# Patient Record
Sex: FEMALE | Race: WHITE | NOT HISPANIC OR LATINO | Employment: OTHER | URBAN - METROPOLITAN AREA
[De-identification: names, ages, dates, MRNs, and addresses within clinical notes are randomized per-mention and may not be internally consistent; named-entity substitution may affect disease eponyms.]

---

## 2018-02-22 DIAGNOSIS — M25.851 FEMOROACETABULAR IMPINGEMENT OF RIGHT HIP: Primary | ICD-10-CM

## 2018-03-22 ENCOUNTER — HOSPITAL ENCOUNTER (OUTPATIENT)
Dept: RADIOLOGY | Facility: HOSPITAL | Age: 63
Discharge: HOME | End: 2018-03-22
Attending: ORTHOPAEDIC SURGERY
Payer: COMMERCIAL

## 2018-03-22 DIAGNOSIS — M25.851 FEMOROACETABULAR IMPINGEMENT OF RIGHT HIP: ICD-10-CM

## 2018-03-22 PROCEDURE — 25000003 PHARM REV CODE 250

## 2018-03-22 PROCEDURE — 20610 DRAIN/INJ JOINT/BURSA W/O US: CPT | Mod: RT,,, | Performed by: RADIOLOGY

## 2018-03-22 PROCEDURE — 25500020 PHARM REV CODE 255

## 2018-03-22 PROCEDURE — 77002 NEEDLE LOCALIZATION BY XRAY: CPT | Mod: 26,,, | Performed by: RADIOLOGY

## 2018-03-22 PROCEDURE — 77002 NEEDLE LOCALIZATION BY XRAY: CPT | Mod: TC

## 2018-03-22 RX ORDER — BUPIVACAINE HYDROCHLORIDE 2.5 MG/ML
10 INJECTION, SOLUTION EPIDURAL; INFILTRATION; INTRACAUDAL ONCE
Status: DISCONTINUED | OUTPATIENT
Start: 2018-03-22 | End: 2018-03-23 | Stop reason: HOSPADM

## 2018-03-22 RX ORDER — LIDOCAINE HYDROCHLORIDE 10 MG/ML
INJECTION, SOLUTION EPIDURAL; INFILTRATION; INTRACAUDAL; PERINEURAL
Status: COMPLETED
Start: 2018-03-22 | End: 2018-03-22

## 2018-03-22 RX ORDER — METHYLPREDNISOLONE ACETATE 80 MG/ML
80 INJECTION, SUSPENSION INTRA-ARTICULAR; INTRALESIONAL; INTRAMUSCULAR; SOFT TISSUE ONCE
Status: DISCONTINUED | OUTPATIENT
Start: 2018-03-22 | End: 2018-03-23 | Stop reason: HOSPADM

## 2018-03-22 RX ORDER — METHYLPREDNISOLONE ACETATE 80 MG/ML
INJECTION, SUSPENSION INTRA-ARTICULAR; INTRALESIONAL; INTRAMUSCULAR; SOFT TISSUE
Status: DISPENSED
Start: 2018-03-22 | End: 2018-03-22

## 2018-03-22 RX ORDER — BUPIVACAINE HYDROCHLORIDE 2.5 MG/ML
INJECTION, SOLUTION EPIDURAL; INFILTRATION; INTRACAUDAL
Status: DISPENSED
Start: 2018-03-22 | End: 2018-03-22

## 2018-03-22 RX ADMIN — LIDOCAINE HYDROCHLORIDE: 10 INJECTION, SOLUTION EPIDURAL; INFILTRATION; INTRACAUDAL; PERINEURAL at 10:03

## 2018-03-22 RX ADMIN — IOHEXOL 10 ML: 350 INJECTION, SOLUTION INTRAVENOUS at 10:03

## 2018-05-03 ENCOUNTER — OFFICE VISIT (OUTPATIENT)
Dept: ORTHOPEDICS | Facility: CLINIC | Age: 63
End: 2018-05-03

## 2018-05-03 VITALS
WEIGHT: 198 LBS | SYSTOLIC BLOOD PRESSURE: 130 MMHG | HEART RATE: 80 BPM | DIASTOLIC BLOOD PRESSURE: 90 MMHG | BODY MASS INDEX: 31.82 KG/M2 | HEIGHT: 66 IN

## 2018-05-03 DIAGNOSIS — M25.551 RIGHT HIP PAIN: Primary | ICD-10-CM

## 2018-05-03 DIAGNOSIS — M25.512 CHRONIC LEFT SHOULDER PAIN: ICD-10-CM

## 2018-05-03 DIAGNOSIS — G89.29 CHRONIC LEFT SHOULDER PAIN: ICD-10-CM

## 2018-05-03 DIAGNOSIS — M16.11 PRIMARY OSTEOARTHRITIS OF RIGHT HIP: ICD-10-CM

## 2018-05-03 DIAGNOSIS — M75.42 SUBACROMIAL IMPINGEMENT OF LEFT SHOULDER: ICD-10-CM

## 2018-05-03 DIAGNOSIS — S42.292D FRACTURE, HUMERUS, HEAD, LEFT, WITH ROUTINE HEALING, SUBSEQUENT ENCOUNTER: ICD-10-CM

## 2018-05-03 PROCEDURE — 99213 OFFICE O/P EST LOW 20 MIN: CPT | Mod: 25,,, | Performed by: ORTHOPAEDIC SURGERY

## 2018-05-03 PROCEDURE — 73030 X-RAY EXAM OF SHOULDER: CPT | Mod: LT,,, | Performed by: ORTHOPAEDIC SURGERY

## 2018-05-03 PROCEDURE — 20610 DRAIN/INJ JOINT/BURSA W/O US: CPT | Mod: LT,,, | Performed by: ORTHOPAEDIC SURGERY

## 2018-05-03 PROCEDURE — 73502 X-RAY EXAM HIP UNI 2-3 VIEWS: CPT | Mod: RT,,, | Performed by: ORTHOPAEDIC SURGERY

## 2018-05-03 RX ORDER — METHYLPREDNISOLONE ACETATE 40 MG/ML
40 INJECTION, SUSPENSION INTRA-ARTICULAR; INTRALESIONAL; INTRAMUSCULAR; SOFT TISSUE
Status: DISCONTINUED | OUTPATIENT
Start: 2018-05-03 | End: 2018-05-03 | Stop reason: HOSPADM

## 2018-05-03 RX ORDER — TOPIRAMATE 50 MG/1
TABLET, FILM COATED ORAL
COMMUNITY
End: 2018-06-11

## 2018-05-03 RX ORDER — TRAMADOL HYDROCHLORIDE 50 MG/1
TABLET ORAL
COMMUNITY
End: 2018-05-17

## 2018-05-03 RX ORDER — ROPINIROLE 1 MG/1
TABLET, FILM COATED ORAL
COMMUNITY
End: 2023-02-03 | Stop reason: SDUPTHER

## 2018-05-03 RX ORDER — FERROUS SULFATE 325(65) MG
TABLET ORAL
Status: ON HOLD | COMMUNITY
End: 2018-06-26 | Stop reason: HOSPADM

## 2018-05-03 RX ORDER — METHYLPREDNISOLONE ACETATE 80 MG/ML
1 INJECTION, SUSPENSION INTRA-ARTICULAR; INTRALESIONAL; INTRAMUSCULAR; SOFT TISSUE
COMMUNITY
End: 2018-05-17 | Stop reason: CLARIF

## 2018-05-03 RX ORDER — CLORAZEPATE DIPOTASSIUM 7.5 MG/1
TABLET ORAL
COMMUNITY
End: 2020-03-02

## 2018-05-03 RX ORDER — GABAPENTIN 300 MG/1
CAPSULE ORAL
COMMUNITY
End: 2018-05-17 | Stop reason: ALTCHOICE

## 2018-05-03 RX ORDER — DULOXETIN HYDROCHLORIDE 60 MG/1
CAPSULE, DELAYED RELEASE ORAL
COMMUNITY
End: 2018-05-17 | Stop reason: ALTCHOICE

## 2018-05-03 RX ORDER — PREGABALIN 50 MG/1
CAPSULE ORAL
COMMUNITY
End: 2018-05-17 | Stop reason: ALTCHOICE

## 2018-05-03 RX ORDER — CARISOPRODOL 350 MG/1
TABLET ORAL
COMMUNITY
End: 2018-05-17

## 2018-05-03 RX ORDER — SIMVASTATIN 20 MG/1
TABLET, FILM COATED ORAL
COMMUNITY
End: 2020-03-02

## 2018-05-03 RX ORDER — DEXLANSOPRAZOLE 60 MG/1
CAPSULE, DELAYED RELEASE ORAL
COMMUNITY
End: 2018-05-17 | Stop reason: ALTCHOICE

## 2018-05-03 RX ADMIN — METHYLPREDNISOLONE ACETATE 40 MG: 40 INJECTION, SUSPENSION INTRA-ARTICULAR; INTRALESIONAL; INTRAMUSCULAR; SOFT TISSUE at 03:05

## 2018-05-03 NOTE — PROCEDURES
Large Joint Aspiration/Injection: L subacromial bursa  Date/Time: 5/3/2018 3:05 PM  Performed by: Prosper Hodge MD  Authorized by: Prosper Hodge MD     Consent Done?:  Yes (Verbal)  Indications:  Pain  Procedure site marked: Yes    Timeout: Prior to procedure the correct patient, procedure, and site was verified      Location:  Shoulder  Site:  L subacromial bursa  Prep: Patient was prepped and draped in usual sterile fashion    Ultrasonic Guidance for needle placement: No  Needle size:  25 G  Medications:  40 mg methylPREDNISolone acetate 40 mg/mL; 40 mg methylPREDNISolone acetate 40 mg/mL  Aspirate:  Clear  Patient tolerance:  Patient tolerated the procedure well with no immediate complications    Right shoulder injected

## 2018-05-03 NOTE — PROGRESS NOTES
[facility]    Subjective:     Chief Complaint:   Chief Complaint   Patient presents with    Right Hip - Pain     Right hip injection worked for about 10 days and now right hip is hurting again.    Left Shoulder - Pain     She does have left shoulder pain. She brought disk of X rays done at [facility]       Past Medical History:   Diagnosis Date    Anxiety     Depression     Hypertension        Past Surgical History:   Procedure Laterality Date    HYSTERECTOMY      implanted spinal cord stimulator      TONSILLECTOMY         Current Outpatient Prescriptions   Medication Sig    clorazepate (TRANXENE) 7.5 MG Tab clorazepate dipotassium 7.5 mg tablet   Take 1 tablet 3 times a day by oral route as needed.    dexlansoprazole (DEXILANT) 60 mg capsule Dexilant 60 mg capsule, delayed release   Take 1 capsule every day by oral route for 8 days.    DULoxetine (CYMBALTA) 60 MG capsule Cymbalta 60 mg capsule,delayed release   Take 1 capsule twice a day by oral route for 30 days.    ferrous sulfate 325 mg (65 mg iron) Tab tablet ferrous sulfate 325 mg (65 mg iron) tablet   Take 1 tablet every day by oral route.    ranitidine (ZANTAC) 300 MG capsule Take 300 mg by mouth every evening.    rOPINIRole (REQUIP) 1 MG tablet ropinirole 1 mg tablet   Take 1 tablet every day by oral route at bedtime.    simvastatin (ZOCOR) 20 MG tablet simvastatin 20 mg tablet   Take 1 tablet every day by oral route in the morning.    topiramate (TOPAMAX) 50 MG tablet topiramate 50 mg tablet   Take 1 tablet twice a day by oral route for 30 days.    traMADol (ULTRAM) 50 mg tablet tramadol 50 mg tablet   Take 1  to 2 tablets every 6hrs prn    carisoprodol (SOMA) 350 MG tablet Soma 350 mg tablet   Take 1 tablet as needed by oral route at bedtime for 30 days.    gabapentin (NEURONTIN) 300 MG capsule gabapentin 300 mg capsule   Take 3 capsules 3 times a day by oral route for 30 days.    methylPREDNISolone acetate (DEPO-MEDROL) 80 mg/mL  injection 1 mL.    pregabalin (LYRICA) 50 MG capsule Lyrica 50 mg capsule   Take 1 capsule every day by oral route as directed.     No current facility-administered medications for this visit.        Review of patient's allergies indicates:   Allergen Reactions    Erythromycin Itching    Morphine Swelling    Tramadol Itching    Meperidine Palpitations       History reviewed. No pertinent family history.    Social History     Social History    Marital status:      Spouse name: N/A    Number of children: N/A    Years of education: N/A     Occupational History    Not on file.     Social History Main Topics    Smoking status: Former Smoker    Smokeless tobacco: Not on file    Alcohol use No    Drug use: Unknown    Sexual activity: Not on file     Other Topics Concern    Not on file     Social History Narrative    No narrative on file       History of present illness: Patient comes in today for her left shoulder and right hip. When I saw her last time she had injured her left shoulder but she was being cared for by another orthopedist. She now wants my opinion on her left shoulder. In terms of her right hip her pain is become so severe that she really wants to discuss a hip replacement.      Review of Systems:    Constitution: Negative for chills, fever, and sweats.  Negative for unexplained weight loss.    HENT:  Negative for headaches and blurry vision.    Cardiovascular:Negative for chest pain or irregular heart beat. Negative for hypertension.    Respiratory:  Negative for cough and shortness of breath.    Gastrointestinal: Negative for abdominal pain, heartburn, melena, nausea, and vomitting.    Genitourinary:  Negative bladder incontinence and dysuria.    Musculoskeletal:  See HPI for details.     Neurological: Negative for numbness.    Psychiatric/Behavioral: Negative for depression.  The patient is not nervous/anxious.      Endocrine: Negative for polyuria    Hematologic/Lymphatic: Negative  for bleeding problem.  Does not bruise/bleed easily.    Skin: Negative for poor would healing and rash    Objective:      Physical Examination:    Vital Signs:    Vitals:    05/03/18 1350   BP: (!) 130/90   Pulse: 80       Body mass index is 31.96 kg/m².    This a well-developed, well nourished patient in no acute distress.  They are alert and oriented and cooperative to examination.        Patient has severe discomfort with internal/external rotation of the right hip. She has severe pain with forward flexion of the right hip.    Patient can raise her right shoulder to flexion of 70°. External rotation is 30°. Internal rotation is 20°.  Pertinent New Results:    XRAY Report / Interpretation:   AP and lateral of the left shoulder demonstrates a proximal humerus fracture which has healed with malunion. She has elevation of the tuberosity fragments.    AP and lateral of the right hip demonstrates severe osteoarthritis of the right hip joint.    Assessment/Plan:      Right hip osteoarthritis. She will ultimately need a hip replacement. However because of the severe shoulder pain she's having with the right proximal humerus fracture I've ordered a CT scan to better understand the fracture. She is unable to have an MRI of the shoulder. We will see her back with that CT scan. She may ultimately require a subacromial decompression on the humeral side or a reverse total shoulder. Her left shoulder is injected today      This note was created using Dragon voice recognition software that occasionally misinterpreted phrases or words.

## 2018-05-07 ENCOUNTER — TELEPHONE (OUTPATIENT)
Dept: ORTHOPEDICS | Facility: CLINIC | Age: 63
End: 2018-05-07

## 2018-05-07 NOTE — TELEPHONE ENCOUNTER
----- Message from Christy Jennings sent at 5/7/2018 12:06 PM CDT -----  Contact: Patient  Wants to schedule right hip replacement.  Please call [phone]

## 2018-05-17 ENCOUNTER — OFFICE VISIT (OUTPATIENT)
Dept: ORTHOPEDICS | Facility: CLINIC | Age: 63
End: 2018-05-17

## 2018-05-17 VITALS
BODY MASS INDEX: 30.7 KG/M2 | HEIGHT: 66 IN | DIASTOLIC BLOOD PRESSURE: 80 MMHG | WEIGHT: 191 LBS | SYSTOLIC BLOOD PRESSURE: 118 MMHG

## 2018-05-17 DIAGNOSIS — S42.292D FRACTURE, HUMERUS, HEAD, LEFT, WITH ROUTINE HEALING, SUBSEQUENT ENCOUNTER: ICD-10-CM

## 2018-05-17 DIAGNOSIS — M16.11 PRIMARY OSTEOARTHRITIS OF RIGHT HIP: Primary | ICD-10-CM

## 2018-05-17 PROCEDURE — 99214 OFFICE O/P EST MOD 30 MIN: CPT | Mod: ,,, | Performed by: ORTHOPAEDIC SURGERY

## 2018-05-17 NOTE — PROGRESS NOTES
[facility]    Subjective:     Chief Complaint:   Chief Complaint   Patient presents with    Left Shoulder - Pain     Left shoulder CT results       Past Medical History:   Diagnosis Date    Anxiety     Depression     Hypertension        Past Surgical History:   Procedure Laterality Date    HYSTERECTOMY      implanted spinal cord stimulator      TONSILLECTOMY         Current Outpatient Prescriptions   Medication Sig    clorazepate (TRANXENE) 7.5 MG Tab clorazepate dipotassium 7.5 mg tablet   Take 1 tablet 3 times a day by oral route as needed.    dexlansoprazole (DEXILANT) 60 mg capsule Dexilant 60 mg capsule, delayed release   Take 1 capsule every day by oral route for 8 days.    DULoxetine (CYMBALTA) 60 MG capsule Cymbalta 60 mg capsule,delayed release   Take 1 capsule twice a day by oral route for 30 days.    ferrous sulfate 325 mg (65 mg iron) Tab tablet ferrous sulfate 325 mg (65 mg iron) tablet   Take 1 tablet every day by oral route.    gabapentin (NEURONTIN) 300 MG capsule gabapentin 300 mg capsule   Take 3 capsules 3 times a day by oral route for 30 days.    methylPREDNISolone acetate (DEPO-MEDROL) 80 mg/mL injection 1 mL.    pregabalin (LYRICA) 50 MG capsule Lyrica 50 mg capsule   Take 1 capsule every day by oral route as directed.    ranitidine (ZANTAC) 300 MG capsule Take 300 mg by mouth every evening.    rOPINIRole (REQUIP) 1 MG tablet ropinirole 1 mg tablet   Take 1 tablet every day by oral route at bedtime.    simvastatin (ZOCOR) 20 MG tablet simvastatin 20 mg tablet   Take 1 tablet every day by oral route in the morning.    topiramate (TOPAMAX) 50 MG tablet topiramate 50 mg tablet   Take 1 tablet twice a day by oral route for 30 days.     No current facility-administered medications for this visit.        Review of patient's allergies indicates:   Allergen Reactions    Erythromycin Itching    Morphine Swelling    Tramadol Itching    Meperidine Palpitations        History reviewed. No pertinent family history.    Social History     Social History    Marital status:      Spouse name: N/A    Number of children: N/A    Years of education: N/A     Occupational History    Not on file.     Social History Main Topics    Smoking status: Former Smoker    Smokeless tobacco: Not on file    Alcohol use No    Drug use: Unknown    Sexual activity: Not on file     Other Topics Concern    Not on file     Social History Narrative    No narrative on file       History of present illness: Patient comes in today for her left shoulder and her right hip. The injection with the left shoulder helps. She's really not having nearly as much discomfort as she is in her right hip. She wants to have her right hip fix.      Review of Systems:    Constitution: Negative for chills, fever, and sweats.  Negative for unexplained weight loss.    HENT:  Negative for headaches and blurry vision.    Cardiovascular:Negative for chest pain or irregular heart beat. Negative for hypertension.    Respiratory:  Negative for cough and shortness of breath.    Gastrointestinal: Negative for abdominal pain, heartburn, melena, nausea, and vomitting.    Genitourinary:  Negative bladder incontinence and dysuria.    Musculoskeletal:  See HPI for details.     Neurological: Negative for numbness.    Psychiatric/Behavioral: Negative for depression.  The patient is not nervous/anxious.      Endocrine: Negative for polyuria    Hematologic/Lymphatic: Negative for bleeding problem.  Does not bruise/bleed easily.    Skin: Negative for poor would healing and rash    Objective:      Physical Examination:    Vital Signs:    Vitals:    05/17/18 1320   BP: 118/80       Body mass index is 30.83 kg/m².    This a well-developed, well nourished patient in no acute distress.  They are alert and oriented and cooperative to examination.        Patient has flexion of the right hip to 90°. She has limited internal and external  rotation. She has severe pain with internal and external rotation. She has a 10° flexion contracture. Full range of motion of the right hip. She can get her left hand up overhead. She has abduction to 80° flexion 220°.  Pertinent New Results:  CT scan of the left shoulder is reviewed. It demonstrates an elevated tuberosity fragments. It is unionized partially.  XRAY Report / Interpretation:   No new XRAYS Today.    Assessment/Plan:      Severe osteoarthritis of the right hip. I've offered her a right total hip arthroplasty. In terms of the left shoulder she is an attempt to simply live with this for now. She'll get worse in the future she may need a reverse total shoulder      This note was created using Dragon voice recognition software that occasionally misinterpreted phrases or words.

## 2018-06-04 DIAGNOSIS — M16.11 PRIMARY OSTEOARTHRITIS OF RIGHT HIP: Primary | ICD-10-CM

## 2018-06-04 DIAGNOSIS — Z01.818 PRE-OP EVALUATION: ICD-10-CM

## 2018-06-11 ENCOUNTER — HOSPITAL ENCOUNTER (OUTPATIENT)
Dept: PREADMISSION TESTING | Facility: HOSPITAL | Age: 63
Discharge: HOME | End: 2018-06-11
Attending: ORTHOPAEDIC SURGERY
Payer: COMMERCIAL

## 2018-06-11 ENCOUNTER — HOSPITAL ENCOUNTER (OUTPATIENT)
Dept: RADIOLOGY | Facility: HOSPITAL | Age: 63
Discharge: HOME | End: 2018-06-11
Attending: ORTHOPAEDIC SURGERY
Payer: COMMERCIAL

## 2018-06-11 VITALS — WEIGHT: 198 LBS | BODY MASS INDEX: 31.82 KG/M2 | HEIGHT: 66 IN

## 2018-06-11 DIAGNOSIS — M16.11 PRIMARY OSTEOARTHRITIS OF RIGHT HIP: ICD-10-CM

## 2018-06-11 DIAGNOSIS — Z01.818 PRE-OP EVALUATION: ICD-10-CM

## 2018-06-11 LAB
BILIRUB UR QL STRIP: NEGATIVE
CLARITY UR: CLEAR
COLOR UR: YELLOW
GLUCOSE UR QL STRIP: NEGATIVE
HGB UR QL STRIP: NEGATIVE
KETONES UR QL STRIP: NEGATIVE
LEUKOCYTE ESTERASE UR QL STRIP: NEGATIVE
NITRITE UR QL STRIP: NEGATIVE
PH UR STRIP: 6 [PH] (ref 5–8)
PROT UR QL STRIP: NEGATIVE
SP GR UR STRIP: 1.01 (ref 1–1.03)
URN SPEC COLLECT METH UR: NORMAL
UROBILINOGEN UR STRIP-ACNC: NEGATIVE EU/DL

## 2018-06-11 PROCEDURE — 99900103 DSU ONLY-NO CHARGE-INITIAL HR (STAT)

## 2018-06-11 PROCEDURE — 81003 URINALYSIS AUTO W/O SCOPE: CPT

## 2018-06-11 PROCEDURE — 99900104 DSU ONLY-NO CHARGE-EA ADD'L HR (STAT)

## 2018-06-11 PROCEDURE — 93005 ELECTROCARDIOGRAM TRACING: CPT

## 2018-06-11 PROCEDURE — 71046 X-RAY EXAM CHEST 2 VIEWS: CPT | Mod: 26,,, | Performed by: RADIOLOGY

## 2018-06-11 PROCEDURE — 71046 X-RAY EXAM CHEST 2 VIEWS: CPT | Mod: TC,FY

## 2018-06-11 PROCEDURE — 93010 ELECTROCARDIOGRAM REPORT: CPT | Mod: ,,, | Performed by: INTERNAL MEDICINE

## 2018-06-11 RX ORDER — OMEPRAZOLE 10 MG/1
10 CAPSULE, DELAYED RELEASE ORAL DAILY
COMMUNITY
End: 2021-05-25

## 2018-06-11 NOTE — PRE ADMISSION SCREENING
Patient Name: Gloria Manuel  YOB: 1955   MRN: [id number]     [facility]   Basic Mobility Inpatient Short Form 6 Clicks         How much difficulty does the patient currently have  Unable  A Lot  A Little  None      1. Turning over in bed (including adjusting bedclothes, sheets and blankets)?     1 []    2 [x]    3 []    4 []        2. Sitting down on and standing up from a chair with arms (e.g., wheelchair, bedside commode, etc.)     1 []  2 []  3 []     4 [x]      3. Moving from lying on back to sitting on the side of the bed?     1 []  2 [x]  3 []    4 []    How much help from another person does the patient currently need  Total  A Lot  A Little  None      4. Moving to and from a bed to a chair (including a wheelchair)?    1 []  2 []  3 []    4 [x]      5. Need to walk in hospital room?    1 []  2 []  3 []    4 [x]      6. Climbing 3-5 steps with a railing?    1 []  2 []  3 []    4 [x]       Raw Score:     20             CMS 0-100% Score:   35.83         %   Standardized Score:    47.67           CMS Modifier:      CJ                                    [facility]   Basic Mobility Inpatient Short Form 6 Clicks Score Conversion Table*         *Use this form to convert -PAC Basic Mobility Inpatient Raw Scores.   -[facility] Inpatient Basic Mobility Short Form Scoring Example   1. Add the number values associated with the response to each item. For example, items totals yield a Raw Score of 21.   2. Match the raw score to the t-Scale scores (t-Scale score = 50.25, SE = 4.69).   3. Find the associated CMS % (CMS % = 28.97%).   4. Locate the correct CMS Functional Modifier Code, or G Code (G code = CJ)     NOTE: Each -PAC Short Form has a separate conversion table. Make sure that you use the correct conversion table.       Instruction Manual - page 45 contains conversion table

## 2018-06-11 NOTE — PRE-PROCEDURE INSTRUCTIONS
CATH U/A obtained using sterile technique and 15 F cath. Tolerated procedure well. Clear yellow urine to lab.

## 2018-06-11 NOTE — PRE ADMISSION SCREENING
JOINT CAMP ASSESSMENT    Name Gloria Manuel   MRN [id number]    Age/Sex 62 y.o. female    Surgeon Dr. Prosper Hodge   Joint Camp Date 6/12/2018   Surgery Date 6/25/2018   Procedure Right Hip Arthroplasty   Insurance Payor: [employer] / Plan: [employer] / Product Type: Commercial /    Care Team Patient Care Team:  Mario Alberto Byrne MD as PCP - General (Family Medicine)  Prosper Hodge MD as Consulting Physician (Orthopedic Surgery)    Pharmacy   [id number] - [place], [place] - [address]  [address]  [place] [address]  Phone: [phone] Fax: [phone]     AM-PAC Score   20   Risk Assessment Score 6     Past Medical History:   Diagnosis Date    Anxiety     Arthritis     Back pain     Depression     MVP (mitral valve prolapse)     RLS (restless legs syndrome)     Wears dentures     UPPER AND LOWER BRIDGE    Wears glasses        Past Surgical History:   Procedure Laterality Date    HYSTERECTOMY      implanted spinal cord stimulator      NONFUNCTIONING X 15 YEARS    TONSILLECTOMY           Home Enviroment     Living Arrangement: Lives with spouse  Home Environment: 1-story house/ trailer, number of outside stairs: 1, walk-in shower  Home Safety Concerns: unremarkable    DISCHARGE CAREGIVER/SUPPORT SYSTEM     Identified post-op caregiver: Patient has spouse / significant other.  Patient's caregiver(s) will be able to provide physical assistance. Patient will have someone to assist overnight.      Caregiver present at pre-op interview:  No      PRE-OPERATIVE FUNCTIONAL STATUS     Employment: Employed full time    Pre-op Functional Status: Patient is independent with mobility/ambulation, transfers, ADL's, IADL's.    Use of assistive device for ambulation: none  ADL: self care  ADL Limitations: difficulty with walking  Medical Restrictions: Decreased range of motions in extremities    POTENTIAL BARRIERS TO DISCHARGE/POTENTIAL POST-OP COMPLICATIONS      Patient with Hx of restless leg syndrome, mitral valve prolapse, back pain and anxiety.      DISCHARGE PLAN     Expected LOS of 2 days or less for joint replacement discussed with patient. We also discussed a discharge path of HH for approximately two weeks with a transition to outpatient PT on the third week given no post-op complications.      Patient in agreement with discharge plan: Yes    Discharge to: Discharge home with home brigida (PT/OT) x2 weeks with transition to outpatient PT     HH: [facility]      OP PT: [facility]     Home DME: none     Needed DME at D/C: rolling walker, bedside commode and assistive device kit     Rx: Per Dr. Hodge at discharge.     Meds to Beds: N/A     Patient expected to discharge on Aspirin 325mg by mouth twice daily for DVT prophylaxis.

## 2018-06-12 NOTE — PRE ADMISSION SCREENING
"               [facility] Risk Assessment Scale    Patient Name: Gloria Manuel  YOB: 1955  MRN: [id number]            RIsk Factor Measure Recommendation Patient Data Scale/Score   BMI >40 Reconsider surgery, weight loss   Estimated body mass index is 31.96 kg/m² as calculated from the following:    Height as of this encounter: 5' 6" (1.676 m).    Weight as of this encounter: 89.8 kg (198 lb).   [] 0 = 1 - 24.9  [] 1 = 25-29.9  [x] 2 = 30-34.9  [] 3 = 35-39.9  [] 4 = 40-44.9  [] 5 = 45-99.9   Hemoglobin AIC (if applicable) >9 Delay surgery until DM under control  Refer for:  · Nutrition Therapy  · Exercise   · Medication    No results found for: LABA1C, HGBA1C    Lab Results   Component Value Date     06/11/2018      [] 0 = 4.0-5.6  [] 1 = 5.7-6.4  [] 2 = 6.5-6.9  [] 3 = 7.0-7.9  [] 4 = 8.0-8.9  [] 5 = 9.0-12   Hemoglobin (Anemia) <9 Delay surgery   Correct anemia Lab Results   Component Value Date    HGB 13.6 06/11/2018    [] 20 - <9.0                    Albumin <3 Delay surgery &Workup Lab Results   Component Value Date    ALBUMIN 3.7 06/11/2018    [] 20 - <3.0   Smoking Cessation >4 Weeks Delay Surgery  Refer to OP Cessation Class    Former Smoker [] 20 - current smoker                                _____ PPD                    Hx of MI, PE, Arrhythmia, CVA, DVT <30 Days Delay Surgery    N/A [] 20      Infection Variable Delay surgery and re-evaluate   N/A [] 20 - recent/current infection     Depression (PHQ) >10 out of 27 Delay Surgery and re-evaluate  Medication  Counseling              [x] 0     []1     []2     []3      []4      [] 5                    (1-4)      (5-9)  (10-14)  (15-19)   (20-27)     Memory Impairment & Memory loss (Mini-Cog Screening Tool) Advanced dementia and/or Parkinson's Reconsider surgery     [x] 0     []1     []2     []3     []4     [] 5     Physical Conditioning (Modified AM-PAC Per Physical Therapy at [facility]) Unable to ambulate on day of surgery Delay surgery " and re-evaluate  Pre-Rehabilitation   (PT evaluation)       []  0   [x]4       []8     []12        []16     []20       (<20%)   (<40%)   (<60%)   (<80% )    (>80%)     Home Environment/Caregiver support  (Per /Navigator Interview)    Availability of basic services and/or approprate assistance during post-operative period Delay surgery and re-evaluate  Safe home environment  Health   1 week post-surgery  Transportation  availability  Ability to obtain DME/Medications post-op    [x] 0     []1     []2     []3     []4     [] 5  [x] 0     []1     []2     []3     []4     [] 5  [x] 0     []1     []2     []3     []4     [] 5  [x] 0     []1     []2     []3     []4     [] 5         MD Contact: Dr. Hodge Comments:  Total Score:  6

## 2018-06-22 ENCOUNTER — ANESTHESIA EVENT (OUTPATIENT)
Dept: SURGERY | Facility: HOSPITAL | Age: 63
DRG: 470 | End: 2018-06-22
Payer: COMMERCIAL

## 2018-06-25 ENCOUNTER — HOSPITAL ENCOUNTER (INPATIENT)
Facility: HOSPITAL | Age: 63
LOS: 1 days | Discharge: HOME HEALTH | DRG: 470 | End: 2018-06-26
Attending: ORTHOPAEDIC SURGERY | Admitting: INTERNAL MEDICINE
Payer: COMMERCIAL

## 2018-06-25 ENCOUNTER — ANESTHESIA (OUTPATIENT)
Dept: SURGERY | Facility: HOSPITAL | Age: 63
DRG: 470 | End: 2018-06-25
Payer: COMMERCIAL

## 2018-06-25 ENCOUNTER — SURGERY (OUTPATIENT)
Age: 63
End: 2018-06-25

## 2018-06-25 DIAGNOSIS — Z96.641 STATUS POST TOTAL REPLACEMENT OF RIGHT HIP: Primary | ICD-10-CM

## 2018-06-25 DIAGNOSIS — G25.81 RLS (RESTLESS LEGS SYNDROME): ICD-10-CM

## 2018-06-25 DIAGNOSIS — F32.A DEPRESSION, UNSPECIFIED DEPRESSION TYPE: ICD-10-CM

## 2018-06-25 DIAGNOSIS — M16.11 OSTEOARTHRITIS OF RIGHT HIP, UNSPECIFIED OSTEOARTHRITIS TYPE: ICD-10-CM

## 2018-06-25 LAB
BASOPHILS # BLD AUTO: 0 K/UL
BASOPHILS NFR BLD: 0.1 %
DIFFERENTIAL METHOD: ABNORMAL
EOSINOPHIL # BLD AUTO: 0 K/UL
EOSINOPHIL NFR BLD: 0.3 %
ERYTHROCYTE [DISTWIDTH] IN BLOOD BY AUTOMATED COUNT: 14.5 %
HCT VFR BLD AUTO: 35.4 %
HGB BLD-MCNC: 11.9 G/DL
LYMPHOCYTES # BLD AUTO: 1 K/UL
LYMPHOCYTES NFR BLD: 7.7 %
MCH RBC QN AUTO: 31.9 PG
MCHC RBC AUTO-ENTMCNC: 33.7 G/DL
MCV RBC AUTO: 95 FL
MONOCYTES # BLD AUTO: 0.2 K/UL
MONOCYTES NFR BLD: 2 %
NEUTROPHILS # BLD AUTO: 11.2 K/UL
NEUTROPHILS NFR BLD: 89.9 %
PLATELET # BLD AUTO: 222 K/UL
PMV BLD AUTO: 7.9 FL
RBC # BLD AUTO: 3.74 M/UL
WBC # BLD AUTO: 12.4 K/UL

## 2018-06-25 PROCEDURE — 25000003 PHARM REV CODE 250: Performed by: ANESTHESIOLOGY

## 2018-06-25 PROCEDURE — 25000003 PHARM REV CODE 250: Performed by: INTERNAL MEDICINE

## 2018-06-25 PROCEDURE — 36000711: Performed by: ORTHOPAEDIC SURGERY

## 2018-06-25 PROCEDURE — 27200688 HC TRAY, SPINAL-HYPER/ ISOBARIC: Performed by: ANESTHESIOLOGY

## 2018-06-25 PROCEDURE — 63600175 PHARM REV CODE 636 W HCPCS: Performed by: ORTHOPAEDIC SURGERY

## 2018-06-25 PROCEDURE — 12000002 HC ACUTE/MED SURGE SEMI-PRIVATE ROOM

## 2018-06-25 PROCEDURE — 85025 COMPLETE CBC W/AUTO DIFF WBC: CPT

## 2018-06-25 PROCEDURE — 99222 1ST HOSP IP/OBS MODERATE 55: CPT | Mod: ,,, | Performed by: INTERNAL MEDICINE

## 2018-06-25 PROCEDURE — 94761 N-INVAS EAR/PLS OXIMETRY MLT: CPT

## 2018-06-25 PROCEDURE — C1776 JOINT DEVICE (IMPLANTABLE): HCPCS | Performed by: ORTHOPAEDIC SURGERY

## 2018-06-25 PROCEDURE — 36000710: Performed by: ORTHOPAEDIC SURGERY

## 2018-06-25 PROCEDURE — 25000003 PHARM REV CODE 250: Performed by: ORTHOPAEDIC SURGERY

## 2018-06-25 PROCEDURE — 71000033 HC RECOVERY, INTIAL HOUR: Performed by: ORTHOPAEDIC SURGERY

## 2018-06-25 PROCEDURE — D9220A PRA ANESTHESIA: Mod: ,,, | Performed by: ANESTHESIOLOGY

## 2018-06-25 PROCEDURE — 97530 THERAPEUTIC ACTIVITIES: CPT

## 2018-06-25 PROCEDURE — 63600175 PHARM REV CODE 636 W HCPCS: Performed by: NURSE ANESTHETIST, CERTIFIED REGISTERED

## 2018-06-25 PROCEDURE — 37000008 HC ANESTHESIA 1ST 15 MINUTES: Performed by: ORTHOPAEDIC SURGERY

## 2018-06-25 PROCEDURE — 27201423 OPTIME MED/SURG SUP & DEVICES STERILE SUPPLY: Performed by: ORTHOPAEDIC SURGERY

## 2018-06-25 PROCEDURE — 0SR90JZ REPLACEMENT OF RIGHT HIP JOINT WITH SYNTHETIC SUBSTITUTE, OPEN APPROACH: ICD-10-PCS | Performed by: ORTHOPAEDIC SURGERY

## 2018-06-25 PROCEDURE — 71000039 HC RECOVERY, EACH ADD'L HOUR: Performed by: ORTHOPAEDIC SURGERY

## 2018-06-25 PROCEDURE — 36415 COLL VENOUS BLD VENIPUNCTURE: CPT

## 2018-06-25 PROCEDURE — 37000009 HC ANESTHESIA EA ADD 15 MINS: Performed by: ORTHOPAEDIC SURGERY

## 2018-06-25 PROCEDURE — 27000221 HC OXYGEN, UP TO 24 HOURS

## 2018-06-25 PROCEDURE — 25000003 PHARM REV CODE 250: Performed by: NURSE ANESTHETIST, CERTIFIED REGISTERED

## 2018-06-25 PROCEDURE — 99900103 DSU ONLY-NO CHARGE-INITIAL HR (STAT): Performed by: ORTHOPAEDIC SURGERY

## 2018-06-25 PROCEDURE — 63600175 PHARM REV CODE 636 W HCPCS

## 2018-06-25 PROCEDURE — 64447 NJX AA&/STRD FEMORAL NRV IMG: CPT | Performed by: ANESTHESIOLOGY

## 2018-06-25 PROCEDURE — 99900104 DSU ONLY-NO CHARGE-EA ADD'L HR (STAT): Performed by: ORTHOPAEDIC SURGERY

## 2018-06-25 PROCEDURE — 27200750 HC INSULATED NEEDLE/ STIMUPLEX: Performed by: ANESTHESIOLOGY

## 2018-06-25 PROCEDURE — 76942 ECHO GUIDE FOR BIOPSY: CPT | Mod: 26,,, | Performed by: ANESTHESIOLOGY

## 2018-06-25 PROCEDURE — 64450 NJX AA&/STRD OTHER PN/BRANCH: CPT | Mod: 59,RT,, | Performed by: ANESTHESIOLOGY

## 2018-06-25 PROCEDURE — 97162 PT EVAL MOD COMPLEX 30 MIN: CPT

## 2018-06-25 PROCEDURE — 97116 GAIT TRAINING THERAPY: CPT

## 2018-06-25 DEVICE — LINER ACET PINN SZ36 10D+4X52M: Type: IMPLANTABLE DEVICE | Site: HIP | Status: FUNCTIONAL

## 2018-06-25 DEVICE — IMPLANTABLE DEVICE: Type: IMPLANTABLE DEVICE | Site: HIP | Status: FUNCTIONAL

## 2018-06-25 DEVICE — STEM FEM SZ 5 HIGH OFFSET: Type: IMPLANTABLE DEVICE | Site: HIP | Status: FUNCTIONAL

## 2018-06-25 DEVICE — CUP ACET PINN 10D 32MM 52MM: Type: IMPLANTABLE DEVICE | Site: HIP | Status: FUNCTIONAL

## 2018-06-25 RX ORDER — MUPIROCIN 20 MG/G
1 OINTMENT TOPICAL 2 TIMES DAILY
Status: DISCONTINUED | OUTPATIENT
Start: 2018-06-25 | End: 2018-06-26 | Stop reason: HOSPADM

## 2018-06-25 RX ORDER — OXYCODONE HYDROCHLORIDE 5 MG/1
5 TABLET ORAL ONCE AS NEEDED
Status: DISCONTINUED | OUTPATIENT
Start: 2018-06-25 | End: 2018-06-25 | Stop reason: HOSPADM

## 2018-06-25 RX ORDER — TRANEXAMIC ACID 100 MG/ML
INJECTION, SOLUTION INTRAVENOUS
Status: DISCONTINUED | OUTPATIENT
Start: 2018-06-25 | End: 2018-06-25

## 2018-06-25 RX ORDER — TRAMADOL HYDROCHLORIDE 50 MG/1
50 TABLET ORAL EVERY 6 HOURS PRN
COMMUNITY
End: 2020-03-02

## 2018-06-25 RX ORDER — PROPOFOL 10 MG/ML
VIAL (ML) INTRAVENOUS CONTINUOUS PRN
Status: DISCONTINUED | OUTPATIENT
Start: 2018-06-25 | End: 2018-06-25

## 2018-06-25 RX ORDER — BISACODYL 10 MG
10 SUPPOSITORY, RECTAL RECTAL DAILY
Status: DISCONTINUED | OUTPATIENT
Start: 2018-06-25 | End: 2018-06-26 | Stop reason: HOSPADM

## 2018-06-25 RX ORDER — LIDOCAINE HCL/PF 100 MG/5ML
SYRINGE (ML) INTRAVENOUS
Status: DISCONTINUED | OUTPATIENT
Start: 2018-06-25 | End: 2018-06-25

## 2018-06-25 RX ORDER — ONDANSETRON 4 MG/1
8 TABLET, ORALLY DISINTEGRATING ORAL EVERY 8 HOURS PRN
Status: DISCONTINUED | OUTPATIENT
Start: 2018-06-25 | End: 2018-06-26 | Stop reason: HOSPADM

## 2018-06-25 RX ORDER — SODIUM CHLORIDE 0.9 % (FLUSH) 0.9 %
3 SYRINGE (ML) INJECTION
Status: DISCONTINUED | OUTPATIENT
Start: 2018-06-25 | End: 2018-06-25 | Stop reason: HOSPADM

## 2018-06-25 RX ORDER — LIDOCAINE HYDROCHLORIDE 10 MG/ML
1 INJECTION, SOLUTION EPIDURAL; INFILTRATION; INTRACAUDAL; PERINEURAL ONCE
Status: COMPLETED | OUTPATIENT
Start: 2018-06-25 | End: 2018-06-25

## 2018-06-25 RX ORDER — PHENYLEPHRINE HYDROCHLORIDE 10 MG/ML
INJECTION INTRAVENOUS
Status: DISCONTINUED | OUTPATIENT
Start: 2018-06-25 | End: 2018-06-25

## 2018-06-25 RX ORDER — HYDROCODONE BITARTRATE AND ACETAMINOPHEN 5; 325 MG/1; MG/1
1 TABLET ORAL EVERY 4 HOURS PRN
Status: DISCONTINUED | OUTPATIENT
Start: 2018-06-25 | End: 2018-06-26 | Stop reason: HOSPADM

## 2018-06-25 RX ORDER — FERROUS SULFATE 325(65) MG
325 TABLET, DELAYED RELEASE (ENTERIC COATED) ORAL DAILY
Status: DISCONTINUED | OUTPATIENT
Start: 2018-06-26 | End: 2018-06-26 | Stop reason: HOSPADM

## 2018-06-25 RX ORDER — CEFAZOLIN SODIUM 2 G/50ML
2 SOLUTION INTRAVENOUS
Status: COMPLETED | OUTPATIENT
Start: 2018-06-25 | End: 2018-06-25

## 2018-06-25 RX ORDER — SODIUM CHLORIDE, SODIUM LACTATE, POTASSIUM CHLORIDE, CALCIUM CHLORIDE 600; 310; 30; 20 MG/100ML; MG/100ML; MG/100ML; MG/100ML
INJECTION, SOLUTION INTRAVENOUS CONTINUOUS
Status: DISCONTINUED | OUTPATIENT
Start: 2018-06-25 | End: 2018-06-25

## 2018-06-25 RX ORDER — ROPINIROLE 1 MG/1
1 TABLET, FILM COATED ORAL NIGHTLY
Status: DISCONTINUED | OUTPATIENT
Start: 2018-06-25 | End: 2018-06-26 | Stop reason: HOSPADM

## 2018-06-25 RX ORDER — FENTANYL CITRATE 50 UG/ML
INJECTION, SOLUTION INTRAMUSCULAR; INTRAVENOUS
Status: DISCONTINUED | OUTPATIENT
Start: 2018-06-25 | End: 2018-06-25

## 2018-06-25 RX ORDER — EPHEDRINE SULFATE 50 MG/ML
INJECTION, SOLUTION INTRAVENOUS
Status: DISCONTINUED | OUTPATIENT
Start: 2018-06-25 | End: 2018-06-25

## 2018-06-25 RX ORDER — SODIUM CHLORIDE, SODIUM LACTATE, POTASSIUM CHLORIDE, CALCIUM CHLORIDE 600; 310; 30; 20 MG/100ML; MG/100ML; MG/100ML; MG/100ML
INJECTION, SOLUTION INTRAVENOUS CONTINUOUS
Status: DISCONTINUED | OUTPATIENT
Start: 2018-06-25 | End: 2018-06-26 | Stop reason: HOSPADM

## 2018-06-25 RX ORDER — ACETAMINOPHEN 10 MG/ML
INJECTION, SOLUTION INTRAVENOUS
Status: DISCONTINUED | OUTPATIENT
Start: 2018-06-25 | End: 2018-06-25

## 2018-06-25 RX ORDER — CLORAZEPATE DIPOTASSIUM 3.75 MG/1
7.5 TABLET ORAL 3 TIMES DAILY PRN
Status: DISCONTINUED | OUTPATIENT
Start: 2018-06-25 | End: 2018-06-26 | Stop reason: HOSPADM

## 2018-06-25 RX ORDER — MIDAZOLAM HYDROCHLORIDE 1 MG/ML
INJECTION, SOLUTION INTRAMUSCULAR; INTRAVENOUS
Status: DISCONTINUED | OUTPATIENT
Start: 2018-06-25 | End: 2018-06-25

## 2018-06-25 RX ORDER — BUPIVACAINE HYDROCHLORIDE AND EPINEPHRINE 2.5; 5 MG/ML; UG/ML
INJECTION, SOLUTION EPIDURAL; INFILTRATION; INTRACAUDAL; PERINEURAL
Status: DISCONTINUED | OUTPATIENT
Start: 2018-06-25 | End: 2018-06-25

## 2018-06-25 RX ORDER — HYDROMORPHONE HYDROCHLORIDE 1 MG/ML
0.2 INJECTION, SOLUTION INTRAMUSCULAR; INTRAVENOUS; SUBCUTANEOUS EVERY 5 MIN PRN
Status: DISCONTINUED | OUTPATIENT
Start: 2018-06-25 | End: 2018-06-25 | Stop reason: HOSPADM

## 2018-06-25 RX ORDER — FENTANYL CITRATE 50 UG/ML
25 INJECTION, SOLUTION INTRAMUSCULAR; INTRAVENOUS EVERY 5 MIN PRN
Status: DISCONTINUED | OUTPATIENT
Start: 2018-06-25 | End: 2018-06-25 | Stop reason: HOSPADM

## 2018-06-25 RX ORDER — FAMOTIDINE 20 MG/1
20 TABLET, FILM COATED ORAL 2 TIMES DAILY
Status: DISCONTINUED | OUTPATIENT
Start: 2018-06-25 | End: 2018-06-26 | Stop reason: HOSPADM

## 2018-06-25 RX ORDER — ZOLPIDEM TARTRATE 5 MG/1
5 TABLET ORAL NIGHTLY PRN
Status: DISCONTINUED | OUTPATIENT
Start: 2018-06-25 | End: 2018-06-26 | Stop reason: HOSPADM

## 2018-06-25 RX ORDER — SODIUM CHLORIDE 0.9 % (FLUSH) 0.9 %
5 SYRINGE (ML) INJECTION
Status: DISCONTINUED | OUTPATIENT
Start: 2018-06-25 | End: 2018-06-26 | Stop reason: HOSPADM

## 2018-06-25 RX ORDER — ASPIRIN 325 MG
325 TABLET ORAL 2 TIMES DAILY
Status: DISCONTINUED | OUTPATIENT
Start: 2018-06-25 | End: 2018-06-26 | Stop reason: HOSPADM

## 2018-06-25 RX ORDER — SIMVASTATIN 10 MG/1
20 TABLET, FILM COATED ORAL NIGHTLY
Status: DISCONTINUED | OUTPATIENT
Start: 2018-06-25 | End: 2018-06-26 | Stop reason: HOSPADM

## 2018-06-25 RX ORDER — CEFAZOLIN SODIUM 1 G/50ML
1 SOLUTION INTRAVENOUS
Status: COMPLETED | OUTPATIENT
Start: 2018-06-25 | End: 2018-06-26

## 2018-06-25 RX ADMIN — ASPIRIN 325 MG ORAL TABLET 325 MG: 325 PILL ORAL at 08:06

## 2018-06-25 RX ADMIN — ACETAMINOPHEN 1000 MG: 10 INJECTION, SOLUTION INTRAVENOUS at 10:06

## 2018-06-25 RX ADMIN — LIDOCAINE HYDROCHLORIDE 10 MG: 10 INJECTION, SOLUTION EPIDURAL; INFILTRATION; INTRACAUDAL; PERINEURAL at 07:06

## 2018-06-25 RX ADMIN — SODIUM CHLORIDE, SODIUM LACTATE, POTASSIUM CHLORIDE, AND CALCIUM CHLORIDE: .6; .31; .03; .02 INJECTION, SOLUTION INTRAVENOUS at 11:06

## 2018-06-25 RX ADMIN — HYDROCODONE BITARTRATE AND ACETAMINOPHEN 1 TABLET: 5; 325 TABLET ORAL at 08:06

## 2018-06-25 RX ADMIN — FENTANYL CITRATE 50 MCG: 50 INJECTION, SOLUTION INTRAMUSCULAR; INTRAVENOUS at 10:06

## 2018-06-25 RX ADMIN — EPHEDRINE SULFATE 15 MG: 50 INJECTION, SOLUTION INTRAMUSCULAR; INTRAVENOUS; SUBCUTANEOUS at 11:06

## 2018-06-25 RX ADMIN — FENTANYL CITRATE 50 MCG: 50 INJECTION, SOLUTION INTRAMUSCULAR; INTRAVENOUS at 09:06

## 2018-06-25 RX ADMIN — BUPIVACAINE HYDROCHLORIDE AND EPINEPHRINE 50 ML: 2.5; 5 INJECTION, SOLUTION EPIDURAL; INFILTRATION; INTRACAUDAL; PERINEURAL at 09:06

## 2018-06-25 RX ADMIN — VASOPRESSIN 2 UNITS: 20 INJECTION INTRAVENOUS at 11:06

## 2018-06-25 RX ADMIN — PHENYLEPHRINE HYDROCHLORIDE 100 MCG: 10 INJECTION INTRAVENOUS at 10:06

## 2018-06-25 RX ADMIN — CEFAZOLIN SODIUM 2 G: 2 SOLUTION INTRAVENOUS at 10:06

## 2018-06-25 RX ADMIN — PHENYLEPHRINE HYDROCHLORIDE 200 MCG: 10 INJECTION INTRAVENOUS at 11:06

## 2018-06-25 RX ADMIN — SIMVASTATIN 20 MG: 10 TABLET, FILM COATED ORAL at 08:06

## 2018-06-25 RX ADMIN — EPHEDRINE SULFATE 10 MG: 50 INJECTION, SOLUTION INTRAMUSCULAR; INTRAVENOUS; SUBCUTANEOUS at 11:06

## 2018-06-25 RX ADMIN — TRANEXAMIC ACID 1000 MG: 100 INJECTION, SOLUTION INTRAVENOUS at 10:06

## 2018-06-25 RX ADMIN — PHENYLEPHRINE HYDROCHLORIDE 100 MCG: 10 INJECTION INTRAVENOUS at 11:06

## 2018-06-25 RX ADMIN — SODIUM CHLORIDE, SODIUM LACTATE, POTASSIUM CHLORIDE, AND CALCIUM CHLORIDE: .6; .31; .03; .02 INJECTION, SOLUTION INTRAVENOUS at 07:06

## 2018-06-25 RX ADMIN — CEFAZOLIN SODIUM 1 G: 1 SOLUTION INTRAVENOUS at 06:06

## 2018-06-25 RX ADMIN — LIDOCAINE HYDROCHLORIDE 50 MG: 20 INJECTION, SOLUTION INTRAVENOUS at 10:06

## 2018-06-25 RX ADMIN — MIDAZOLAM 2 MG: 1 INJECTION INTRAMUSCULAR; INTRAVENOUS at 09:06

## 2018-06-25 RX ADMIN — FAMOTIDINE 20 MG: 20 TABLET ORAL at 08:06

## 2018-06-25 RX ADMIN — PROPOFOL 75 MCG/KG/MIN: 10 INJECTION, EMULSION INTRAVENOUS at 10:06

## 2018-06-25 RX ADMIN — HYDROCODONE BITARTRATE AND ACETAMINOPHEN 1 TABLET: 5; 325 TABLET ORAL at 03:06

## 2018-06-25 RX ADMIN — MUPIROCIN 1 G: 20 OINTMENT TOPICAL at 08:06

## 2018-06-25 RX ADMIN — ROPINIROLE HYDROCHLORIDE 1 MG: 1 TABLET, FILM COATED ORAL at 08:06

## 2018-06-25 NOTE — ANESTHESIA PREPROCEDURE EVALUATION
06/25/2018  Gloria Manuel is a 62 y.o., female.    Anesthesia Evaluation      I have reviewed the Medications.     Review of Systems  Anesthesia Hx:  No problems with previous Anesthesia   Social:  Non-Smoker, No Alcohol Use    Cardiovascular:  Cardiovascular Normal     Pulmonary:  Pulmonary Normal    Renal/:  Renal/ Normal     Hepatic/GI:  Hepatic/GI Normal    Musculoskeletal:   Arthritis  Spinal cord stimulator   Neurological:   RLS  Chronic Pain Syndrome   Endocrine:  Endocrine Normal    Psych:   anxiety          Physical Exam  General:  Obesity    Airway/Jaw/Neck:  Airway Findings: Mouth Opening: Normal General Airway Assessment: Adult  Mallampati: II  Jaw/Neck Findings:  Neck ROM: Extension Decreased, Mild      Dental:  Dental Findings: Upper Dentures   Chest/Lungs:  Chest/Lungs Findings: Clear to auscultation, Normal Respiratory Rate     Heart/Vascular:  Heart Findings: Rate: Normal  Rhythm: Regular Rhythm  Sounds: Normal  Heart murmur: negative Vascular Findings: Normal (No carotid bruits.)       Mental Status:  Mental Status Findings:  Cooperative, Alert and Oriented         Anesthesia Plan  Type of Anesthesia, risks & benefits discussed:  Anesthesia Type:  spinal  Patient's Preference:   Intra-op Monitoring Plan:   Intra-op Monitoring Plan Comments:   Post Op Pain Control Plan: peripheral nerve block  Post Op Pain Control Plan Comments:   Induction:    Beta Blocker:  Patient is not currently on a Beta-Blocker (No further documentation required).       Informed Consent: Patient understands risks and agrees with Anesthesia plan.  Questions answered. Anesthesia consent signed with patient.  ASA Score: 2     Day of Surgery Review of History & Physical:            Ready For Surgery From Anesthesia Perspective.

## 2018-06-25 NOTE — PT/OT/SLP EVAL
Physical Therapy Evaluation    Patient Name:  Gloria Manuel   MRN:  [id number]    Recommendations:     Discharge Recommendations:  home health PT   Discharge Equipment Recommendations: none (has RW at home)   Barriers to discharge: None    Assessment:     Gloria Manuel is a 62 y.o. female admitted with a medical diagnosis of <principal problem not specified>.  She presents with the following impairments/functional limitations:  weakness, impaired endurance, impaired functional mobilty, impaired self care skills, decreased lower extremity function, decreased safety awareness, orthopedic precautions . Pt seen po day0, alert, motivated and did well with PT..    Rehab Prognosis:  good; patient would benefit from acute skilled PT services to address these deficits and reach maximum level of function.      Recent Surgery: Procedure(s) (LRB):  ARTHROPLASTY, HIP (Right) Day of Surgery    Plan:     During this hospitalization, patient to be seen BID to address the above listed problems via gait training, therapeutic activities, therapeutic exercises  · Plan of Care Expires:  07/06/18   Plan of Care Reviewed with: patient, spouse    Subjective     Communicated with nurse Shields prior to session.  Patient found supine upon PT entry to room, agreeable to evaluation.    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  Patient has the following equipment: none.  DME owned (not currently used): rolling walker.  Upon discharge, patient will have assistance from spouse.    Objective:     Patient found with: hip  abduction pillow, peripheral IV, SCD     General Precautions: Standard, fall   Orthopedic Precautions:RLE weight bearing as tolerated, RLE posterior precautions   Braces: N/A     Exams:  · RLE ROM: Deficits: within hip precautions  · RLE Strength: 3-/5  · LLE ROM: WFL  · LLE Strength: WFL    Functional Mobility:  · Bed Mobility:     · Scooting: minimum assistance  · Supine to Sit: minimum assistance  · Sit to Supine: minimum assistance  · Transfers:     · Sit to Stand:  minimum assistance with rolling walker  · Gait: 30ft with RW WBAT RLE- small steps    AM-PAC 6 CLICK MOBILITY  Total Score:16       Therapeutic Activities and Exercises:   pt completed ankle pumping exercises and GS while supine  Abductor pillow removed, educated on DARNELL precautions  Gait with RW in room  Back to bed with SCD,abductor pillow reapplied    Patient left HOB elevated with all lines intact, call button in reach, nurse Alyson notified and spouse present.    GOALS:    Physical Therapy Goals        Problem: Physical Therapy Goal    Goal Priority Disciplines Outcome Goal Variances Interventions   Physical Therapy Goal     PT/OT, PT Ongoing (interventions implemented as appropriate)     Description:  Goals to be met by: 2018     Patient will increase functional independence with mobility by performin. Supine to sit with Contact Guard Assistance  2. Sit to stand transfer with Contact Guard Assistance  3. Bed to chair transfer with Minimal Assistance using Rolling Walker  4. Gait  x 250 feet with Contact Guard Assistance using Rolling Walker.   5. Lower extremity exercise program x20 reps per handout, with assistance as needed                      History:     Past Medical History:   Diagnosis Date    Anxiety     Arthritis     Back pain     Depression     MVP (mitral valve prolapse)     RLS (restless legs syndrome)     Wears dentures     UPPER AND LOWER BRIDGE    Wears glasses        Past Surgical History:   Procedure  Laterality Date    BACK SURGERY      nerve stimulator    HYSTERECTOMY      implanted spinal cord stimulator      NONFUNCTIONING X 15 YEARS    SACRAL NERVE STIMULATOR PLACEMENT Left     TONSILLECTOMY         Clinical Decision Making:     History  Co-morbidities and personal factors that may impact the plan of care Examination  Body Structures and Functions, activity limitations and participation restrictions that may impact the plan of care Clinical Presentation   Decision Making/ Complexity Score   Co-morbidities:   [] Time since onset of injury / illness / exacerbation  [] Status of current condition  []Patient's cognitive status and safety concerns    [] Multiple Medical Problems (see med hx)  Personal Factors:   [] Patient's age  [] Prior Level of function   [] Patient's home situation (environment and family support)  [] Patient's level of motivation  [] Expected progression of patient      HISTORY:(criteria)    [] 44876 - no personal factors/history    [] 94303 - has 1-2 personal factor/comorbidity     [] 71057 - has >3 personal factor/comorbidity     Body Regions:  [] Objective examination findings  [] Head     []  Neck  [] Trunk   [] Upper Extremity  [] Lower Extremity    Body Systems:  [] For communication ability, affect, cognition, language, and learning style: the assessment of the ability to make needs known, consciousness, orientation (person, place, and time), expected emotional /behavioral responses, and learning preferences (eg, learning barriers, education  needs)  [] For the neuromuscular system: a general assessment of gross coordinated movement (eg, balance, gait, locomotion, transfers, and transitions) and motor function  (motor control and motor learning)  [] For the musculoskeletal system: the assessment of gross symmetry, gross range of motion, gross strength, height, and weight  [] For the integumentary system: the assessment of pliability(texture), presence of scar formation, skin  color, and skin integrity  [] For cardiovascular/pulmonary system: the assessment of heart rate, respiratory rate, blood pressure, and edema     Activity limitations:    [] Patient's cognitive status and saf ety concerns          [] Status of current condition      [] Weight bearing restriction  [] Cardiopulmunary Restriction    Participation Restrictions:   [] Goals and goal agreement with the patient     [] Rehab potential (prognosis) and probable outcome      Examination of Body System: (criteria)    [] 12865 - addressing 1-2 elements    [] 28851 - addressing a total of 3 or more elements     [] 36980 -  Addressing a total of 4 or more elements         Clinical Presentation: (criteria)  Choose one     On examination of body system using standardized tests and measures patient presents with (CHOOSE ONE) elements from any of the following: body structures and functions, activity limitations, and/or participation restrictions.  Leading to a clinical presentation that is considered (CHOOSE ONE)                              Clinical Decision Making  (Eval Complexity):  Choose One     Time Tracking:     PT Received On: 06/25/18  PT Start Time: 1311     PT Stop Time: 1352  PT Total Time (min): 41 min     Billable Minutes: Evaluation 10, Gait Training 21 and Therapeutic Activity 10      Janel Dixon, PT  06/25/2018

## 2018-06-25 NOTE — OR NURSING
Pt arrived to preop and placed in bed. SCD to left leg and warm blankets provided. Periop process explained to both patient and spouse with verbalized understanding. Pts belongings tagged/bagged and kept in preop. Pts spouse kept pts phone and lap top.

## 2018-06-25 NOTE — OP NOTE
DATE OF PROCEDURE:  06/25/2018.    ATTENDING PHYSICIAN:  Prosper Hodge M.D.    FIRST ASSISTANT:  Robert Sagastume.    PREOPERATIVE DIAGNOSIS:  Bone-on-bone osteoarthritis, right hip.    POSTOPERATIVE DIAGNOSIS:  Bone-on-bone osteoarthritis, right hip.    PROCEDURE PERFORMED:  Right total hip arthroplasty.    ESTIMATED BLOOD LOSS:  200 mL    INTRAVENOUS FLUID:  Crystalloid.    ANESTHESIA:  General anesthesia.    PROCEDURE IN DETAIL:  The patient was placed on the operating table in the   lateral decubitus position.  We did spend a lot of time positioning her prepping   her and making sure that she was safe.  Now posterolateral approach was   undertaken to the hip and carried down sharply through the skin.  The deep   fascia was visualized and incised in line with its fibers.  The sciatic nerve   was palpated and the Charnley retractors were placed.  We now took down the   short external rotators.  The capsule was split.  The hip was dislocated.  A   preliminary femoral neck cut was made and the femoral head was removed.  We now   began preparing the femur.  We pulsed.  We now used the cookie cutter and then   the canal finder and then the lateralizer and then began reaming.  We reamed up   to a 5.  We now broached and then broached to a 5.  This gave us excellent fit   and fill.  We removed the trial stem and turned our attention to the acetabulum.    Retractors were placed anteriorly and posteriorly, being very careful not to   damage any neurovascular structures.  The labrum was excised.  We now used the   coagulator to clean out the foveal contents.  We then used a 47 mm reamer and   just medialized to the level of the fovea.  We now expanded our reamer to 49.    That gave us excellent bleeding bone.  We tapped our actual cup into position.    We had an outstanding press fit.  We placed a liner.  We pulled on the cup   firmly.  It was very strong and well pressfit into the acetabulum.  We now   turned our attention back  to the femur.  We dropped our trial stem into position   and began trialing.  With the +8 high offset, we had excellent stability and   symmetric leg lengths.  I felt that this was the best choice for her and I   therefore removed the trial components and copiously irrigated and then tapped   down the actual component.  The construct trialed perfectly.  The sciatic nerve   was visualized and noted to be in good position and condition.  We removed the   Charnley retractors.  We closed the deep fascia with a running #1 Vicryl.  The   subcutaneous was closed with 2-0 Vicryl and the skin with PDS.  Sterile   dressings were applied and the patient was taken to Recovery Room pending an   x-ray and a neurovascular check.      SF/HN  dd: 06/25/2018 11:53:13 (CDT)  td: 06/25/2018 14:52:26 (CDT)  Doc ID   #[id number]  Job ID #[id number]    CC:

## 2018-06-25 NOTE — PLAN OF CARE
Problem: Physical Therapy Goal  Goal: Physical Therapy Goal  Goals to be met by: 2018     Patient will increase functional independence with mobility by performin. Supine to sit with Contact Guard Assistance  2. Sit to stand transfer with Contact Guard Assistance  3. Bed to chair transfer with Minimal Assistance using Rolling Walker  4. Gait  x 250 feet with Contact Guard Assistance using Rolling Walker.   5. Lower extremity exercise program x20 reps per handout, with assistance as needed    Outcome: Ongoing (interventions implemented as appropriate)  PT eval and treat completed. Gait 30ft with RW WBAT RLE. Pt reviewed on DARNELL precautions. Educated on AP and gluteal sets

## 2018-06-25 NOTE — PLAN OF CARE
Pt awake alert and oriented, vs stable: pt stated BP normally runs low, denies pain, dressing to R hip cdi, abduction pillow in place, ice pack to R hip, sandhu catheter in place and flowing freely, tolerated clear liquids, IV fluids infused, SCD to R foot and L leg on, able to move BLEs s/p spinal. Ok per Dr. Levin to transfer the pt to the floor. Pt transported via bed from PACU to room 405. Pt's  at the bedside. Call light within reach. Report given to ELGIN Shields.

## 2018-06-25 NOTE — H&P
PCP: Mario Alberto Byrne MD    History & Physical    Chief Complaint: s/p right total hip arthroplasty    History of Present Illness:  Patient is a 62 y.o. female admitted to Hospitalist Service from Operation Room s/p right total hip arthroplasty performed by Dr. Hodge. Patient reportedly has past medical history significant for OA, depression, RLS and anxiety disorder. Post-operatively, patient denied chest pain, shortness of breath, abdominal pain, nausea, vomiting, headache, vision changes, focal neuro-deficits, cough or fever.    Past Medical History:   Diagnosis Date    Anxiety     Arthritis     Back pain     Depression     MVP (mitral valve prolapse)     RLS (restless legs syndrome)     Wears dentures     UPPER AND LOWER BRIDGE    Wears glasses      Past Surgical History:   Procedure Laterality Date    BACK SURGERY      nerve stimulator    HYSTERECTOMY      implanted spinal cord stimulator      NONFUNCTIONING X 15 YEARS    SACRAL NERVE STIMULATOR PLACEMENT Left     TONSILLECTOMY       History reviewed. No pertinent family history.  Social History   Substance Use Topics    Smoking status: Former Smoker    Smokeless tobacco: Never Used    Alcohol use Yes      Comment: RARELY      Review of patient's allergies indicates:   Allergen Reactions    Morphine Swelling     NECK SWELLED    Demerol (pf) [meperidine (pf)] Other (See Comments)     HEART RACES, BOUNCES OFF WALL    Erythromycin Itching     PTA Medications   Medication Sig    clorazepate (TRANXENE) 7.5 MG Tab clorazepate dipotassium 7.5 mg tablet   Take 1 tablet 3 times a day by oral route as needed.    ferrous sulfate 325 mg (65 mg iron) Tab tablet ferrous sulfate 325 mg (65 mg iron) tablet   Take 1 tablet every day by oral route.    omeprazole (PRILOSEC) 10 MG capsule Take 10 mg by mouth once daily.    ranitidine (ZANTAC) 300 MG capsule Take 300 mg by mouth 2 (two) times daily.     rOPINIRole (REQUIP) 1 MG tablet ropinirole 1 mg tablet    Take 1 tablet every day by oral route at bedtime.    simvastatin (ZOCOR) 20 MG tablet simvastatin 20 mg tablet   Take 1 tablet every day by oral route in the morning.    traMADol (ULTRAM) 50 mg tablet Take 50 mg by mouth every 6 (six) hours as needed for Pain.     Review of Systems:  Constitutional: no fever or chills  Eyes: no visual changes  Ears, nose, mouth, throat, and face: no nasal congestion or sore throat  Respiratory: no cough or shorness of breath  Cardiovascular: no chest pain or palpitations  Gastrointestinal: no nausea or vomiting, no abdominal pain or change in bowel habits  Genitourinary: no hematuria or dysuria  Integument/breast: no rash or pruritis  Hematologic/lymphatic: no easy bruising or lymphadenopathy  Musculoskeletal: see HPI  Neurological: no seizures or tremors.  Behavioral/Psych: no auditory or visual hallucinations  Endocrine: no heat or cold intolerance     OBJECTIVE:     Vital Signs (Most Recent)  Temp: 96.5 °F (35.8 °C) (06/25/18 1538)  Pulse: 75 (06/25/18 1538)  Resp: 16 (06/25/18 1538)  BP: (!) 100/57 (06/25/18 1538)  SpO2: (!) 91 % (06/25/18 1634)    Physical Exam:  General appearance: well developed, appears stated age  Head: normocephalic, atraumatic  Eyes:  conjunctivae/corneas clear. PERRL.  Nose: Nares normal. Septum midline.  Throat: lips, mucosa, and tongue normal; teeth and gums normal, no throat erythema.  Neck: supple, symmetrical, trachea midline, no JVD and thyroid not enlarged, symmetric, no tenderness/mass/nodules  Lungs:  clear to auscultation bilaterally and normal respiratory effort  Chest wall: no tenderness  Heart: regular rate and rhythm, S1, S2 normal, no murmur, click, rub or gallop  Abdomen: soft, non-tender non-distented; bowel sounds normal; no masses,  no organomegaly  Extremities: no cyanosis, clubbing or edema. Right hip dressing C/D/I.  Pulses: 2+ and symmetric  Skin: Skin color, texture, turgor normal. No rashes or lesions.  Lymph nodes: Cervical,  supraclavicular, and axillary nodes normal.  Neurologic: Normal strength and tone. No focal numbness or weakness. CNII-XII intact.      Laboratory:   CBC:   Recent Labs  Lab 06/25/18  1300   WBC 12.40   RBC 3.74*   HGB 11.9*   HCT 35.4*      MCV 95   MCH 31.9*   MCHC 33.7     CMP: No results for input(s): GLU, CALCIUM, ALBUMIN, PROT, NA, K, CO2, CL, BUN, CREATININE, ALKPHOS, ALT, AST, BILITOT in the last 168 hours.    Diagnostic Results: None    Assessment/Plan:     Active Hospital Problems    Diagnosis  POA    *Status post total replacement of right hip [Z96.641]  Not Applicable    Osteoarthritis of right hip [M16.11]  Continue to follow Orthopedic recommendations.  Needs aggressive incentive spirometry.  Follow hemoglobin and hematocrit closely.  Pain control with IV narcotics and antiemetics as needed.  Physical therapy as per Orthopedics protocol with fall precautions.    Yes    Depression [F32.9]  On PO Tranxene.    Yes    RLS (restless legs syndrome) [G25.81]  On Requip.  Yes        DVT prophylaxis:  mg PO BID as per Dr. Hodge.    Anju Mejias MD  Department of Hospital Medicine   [facility]

## 2018-06-25 NOTE — PLAN OF CARE
Problem: Patient Care Overview  Goal: Plan of Care Review  Outcome: Ongoing (interventions implemented as appropriate)  02 saturation 91% on room air.  Advised pt. To wear her 02 while sleeping.

## 2018-06-25 NOTE — TRANSFER OF CARE
"Anesthesia Transfer of Care Note    Patient: Gloria Manuel    Procedure(s) Performed: Procedure(s) (LRB):  ARTHROPLASTY, HIP (Right)    Patient location: PACU    Anesthesia Type: spinal and MAC    Transport from OR: Transported from OR on 2-3 L/min O2 by NC with adequate spontaneous ventilation    Post pain: adequate analgesia    Post assessment: no apparent anesthetic complications    Post vital signs: stable    Level of consciousness: awake    Nausea/Vomiting: no nausea/vomiting    Complications: none    Transfer of care protocol was followed      Last vitals:   Visit Vitals  /60   Pulse 76   Temp 36.9 °C (98.4 °F) (Temporal)   Resp 14   Ht 5' 6" (1.676 m)   Wt 89.8 kg (198 lb)   SpO2 97%   Breastfeeding? No   BMI 31.96 kg/m²     "

## 2018-06-25 NOTE — BRIEF OP NOTE
[facility]  Brief Operative Note    SUMMARY     Surgery Date: 6/25/2018     Surgeon(s) and Role:     * Prosper Hodge MD - Primary    Assisting Surgeon: None    Pre-op Diagnosis:  Primary osteoarthritis of right hip [M16.11]    Post-op Diagnosis:  Post-Op Diagnosis Codes:     * Primary osteoarthritis of right hip [M16.11]    Procedure(s) (LRB):  ARTHROPLASTY, HIP (Right)    Anesthesia: General    Description of Procedure: fern r dictated    Description of the findings of the procedure: [id number]    Estimated Blood Loss: 100 mL         Specimens:   Specimen (12h ago through future)    None

## 2018-06-25 NOTE — ANESTHESIA POSTPROCEDURE EVALUATION
"Anesthesia Post Evaluation    Patient: Gloria Manuel    Procedure(s) Performed: Procedure(s) (LRB):  ARTHROPLASTY, HIP (Right)    Final Anesthesia Type: spinal  Patient location during evaluation: PACU  Patient participation: Yes- Able to Participate  Level of consciousness: awake and alert and oriented  Post-procedure vital signs: reviewed and stable  Pain management: adequate  Airway patency: patent  PONV status at discharge: No PONV  Anesthetic complications: no      Cardiovascular status: hemodynamically stable and blood pressure returned to baseline  Respiratory status: unassisted, spontaneous ventilation and room air  Hydration status: euvolemic  Follow-up not needed.        Visit Vitals  BP (!) 111/57   Pulse 66   Temp 36.4 °C (97.5 °F) (Skin)   Resp 15   Ht 5' 6" (1.676 m)   Wt 89.8 kg (198 lb)   SpO2 (!) 94%   Breastfeeding? No   BMI 31.96 kg/m²       Pain/Ray Score: Pain Assessment Performed: Yes (6/25/2018 12:50 PM)  Presence of Pain: denies (6/25/2018 12:50 PM)  Pain Rating Prior to Med Admin: 7 (6/25/2018  7:32 AM)  Ray Score: 9 (6/25/2018 12:50 PM)      "

## 2018-06-25 NOTE — ANESTHESIA PROCEDURE NOTES
Peripheral    Patient location during procedure: pre-op   Block not for primary anesthetic.  Reason for block: at surgeon's request and post-op pain management   Post-op Pain Location: right hip DJD  Start time: 6/25/2018 9:25 AM  Timeout: 6/25/2018 9:24 AM   End time: 6/25/2018 9:31 AM  Surgery related to: right THR  Staffing  Anesthesiologist: ARLEEN GALINDO  Preanesthetic Checklist  Completed: patient identified, site marked, surgical consent, pre-op evaluation, timeout performed, IV checked, risks and benefits discussed and monitors and equipment checked  Peripheral Block  Patient position: supine  Prep: ChloraPrep  Patient monitoring: cardiac monitor, heart rate, continuous pulse ox, continuous capnometry and frequent blood pressure checks  Block type: fascia iliaca (Supra Inguinal )  Laterality: right  Injection technique: single shot  Needle  Needle type: Stimuplex   Needle gauge: 22 G  Needle length: 2 in  Needle localization: ultrasound guidance and anatomical landmarks   -ultrasound image captured on disc.  Assessment  Injection assessment: local visualized surrounding nerve, negative parasthesia and negative aspiration  Paresthesia pain: none  Heart rate change: no  Slow fractionated injection: yes  Medications:  Bolus administered: 50 mL of 0.25 bupivacaine  Epinephrine added: 3.75 mcg/mL (1/300,000) and 5 mcg/mL (1/200,000)  Additional Notes  VSS. Patient tolerated the block well.

## 2018-06-25 NOTE — PLAN OF CARE
Problem: Patient Care Overview  Goal: Plan of Care Review  Outcome: Ongoing (interventions implemented as appropriate)  POC reviewed with pt, pt verbalized understanding. Safety maintained throughout shift, bed locked and in lowest position, call light in reach, Side rails up X 2. Non skid sock on when OOB. Pt remained free of fall/trauma. Pt self reports of pain treated with PO pain medication as ordered.  VSS, pt remained afebrile this shift. Pt walked with physical therapy this shift. Pt tolerating meals well, no reports of nausea and vomiting.  IV abx  infused as ordered. Dressing to right hip clean, dry and intact. NV check done q4hrs. Ice pack to hip. IS at bedside.  Will continue to monitor.

## 2018-06-25 NOTE — ANESTHESIA PROCEDURE NOTES
Spinal    Diagnosis: right hip DJD  Patient location during procedure: OR  Start time: 6/25/2018 10:36 AM  Timeout: 6/25/2018 10:35 AM  End time: 6/25/2018 10:42 AM  Staffing  Anesthesiologist: ARLEEN GALINDO  Performed: anesthesiologist   Preanesthetic Checklist  Completed: patient identified, site marked, surgical consent, pre-op evaluation, timeout performed, IV checked, risks and benefits discussed and monitors and equipment checked  Spinal Block  Patient position: sitting  Prep: ChloraPrep  Patient monitoring: heart rate, cardiac monitor and continuous pulse ox  Approach: midline  Location: L2-3  Injection technique: single shot  CSF Fluid: clear free-flowing CSF  Needle  Needle type: pencil-tip   Needle gauge: 25 G  Needle length: 3.5 in  Additional Documentation: incremental injection and no paresthesia on injection  Needle localization: anatomical landmarks  Assessment  Ease of block: easy  Patient's tolerance of the procedure: comfortable throughout block and no complaints  Medications:  Bolus administered: 1.6 mL of 0.75 and with dextrose bupivacaine  Additional Notes  Attempted x 1 L 3/4 without success. Attempt x 1 L 2/3 without problem.

## 2018-06-26 VITALS
HEART RATE: 85 BPM | TEMPERATURE: 98 F | HEIGHT: 66 IN | WEIGHT: 198 LBS | OXYGEN SATURATION: 93 % | SYSTOLIC BLOOD PRESSURE: 110 MMHG | DIASTOLIC BLOOD PRESSURE: 64 MMHG | BODY MASS INDEX: 31.82 KG/M2 | RESPIRATION RATE: 16 BRPM

## 2018-06-26 LAB
ANION GAP SERPL CALC-SCNC: 8 MMOL/L
BASOPHILS # BLD AUTO: 0 K/UL
BASOPHILS NFR BLD: 0 %
BUN SERPL-MCNC: 12 MG/DL
CALCIUM SERPL-MCNC: 9.5 MG/DL
CHLORIDE SERPL-SCNC: 107 MMOL/L
CO2 SERPL-SCNC: 26 MMOL/L
CREAT SERPL-MCNC: 0.7 MG/DL
DIFFERENTIAL METHOD: ABNORMAL
EOSINOPHIL # BLD AUTO: 0 K/UL
EOSINOPHIL NFR BLD: 0 %
ERYTHROCYTE [DISTWIDTH] IN BLOOD BY AUTOMATED COUNT: 14.7 %
EST. GFR  (AFRICAN AMERICAN): >60 ML/MIN/1.73 M^2
EST. GFR  (NON AFRICAN AMERICAN): >60 ML/MIN/1.73 M^2
GLUCOSE SERPL-MCNC: 139 MG/DL
HCT VFR BLD AUTO: 35.8 %
HGB BLD-MCNC: 12.1 G/DL
LYMPHOCYTES # BLD AUTO: 1.1 K/UL
LYMPHOCYTES NFR BLD: 7.9 %
MCH RBC QN AUTO: 31.9 PG
MCHC RBC AUTO-ENTMCNC: 33.7 G/DL
MCV RBC AUTO: 95 FL
MONOCYTES # BLD AUTO: 0.7 K/UL
MONOCYTES NFR BLD: 5 %
NEUTROPHILS # BLD AUTO: 12.2 K/UL
NEUTROPHILS NFR BLD: 87.1 %
PLATELET # BLD AUTO: 236 K/UL
PMV BLD AUTO: 8.6 FL
POTASSIUM SERPL-SCNC: 4.1 MMOL/L
RBC # BLD AUTO: 3.78 M/UL
SODIUM SERPL-SCNC: 141 MMOL/L
WBC # BLD AUTO: 14 K/UL

## 2018-06-26 PROCEDURE — G8988 SELF CARE GOAL STATUS: HCPCS | Mod: CK

## 2018-06-26 PROCEDURE — 94761 N-INVAS EAR/PLS OXIMETRY MLT: CPT

## 2018-06-26 PROCEDURE — 97116 GAIT TRAINING THERAPY: CPT

## 2018-06-26 PROCEDURE — 97530 THERAPEUTIC ACTIVITIES: CPT

## 2018-06-26 PROCEDURE — 99239 HOSP IP/OBS DSCHRG MGMT >30: CPT | Mod: ,,, | Performed by: INTERNAL MEDICINE

## 2018-06-26 PROCEDURE — 25000003 PHARM REV CODE 250: Performed by: INTERNAL MEDICINE

## 2018-06-26 PROCEDURE — 63600175 PHARM REV CODE 636 W HCPCS: Performed by: ORTHOPAEDIC SURGERY

## 2018-06-26 PROCEDURE — G8987 SELF CARE CURRENT STATUS: HCPCS | Mod: CK

## 2018-06-26 PROCEDURE — 80048 BASIC METABOLIC PNL TOTAL CA: CPT

## 2018-06-26 PROCEDURE — 25000003 PHARM REV CODE 250: Performed by: ORTHOPAEDIC SURGERY

## 2018-06-26 PROCEDURE — 36415 COLL VENOUS BLD VENIPUNCTURE: CPT

## 2018-06-26 PROCEDURE — G8989 SELF CARE D/C STATUS: HCPCS | Mod: CK

## 2018-06-26 PROCEDURE — 97110 THERAPEUTIC EXERCISES: CPT

## 2018-06-26 PROCEDURE — 85025 COMPLETE CBC W/AUTO DIFF WBC: CPT

## 2018-06-26 PROCEDURE — 97165 OT EVAL LOW COMPLEX 30 MIN: CPT

## 2018-06-26 PROCEDURE — 97535 SELF CARE MNGMENT TRAINING: CPT

## 2018-06-26 RX ORDER — ASPIRIN 325 MG
325 TABLET ORAL 2 TIMES DAILY
Refills: 0
Start: 2018-06-26 | End: 2018-09-13

## 2018-06-26 RX ADMIN — CEFAZOLIN SODIUM 1 G: 1 SOLUTION INTRAVENOUS at 01:06

## 2018-06-26 RX ADMIN — MUPIROCIN 1 G: 20 OINTMENT TOPICAL at 08:06

## 2018-06-26 RX ADMIN — ASPIRIN 325 MG ORAL TABLET 325 MG: 325 PILL ORAL at 08:06

## 2018-06-26 RX ADMIN — BISACODYL 10 MG: 10 SUPPOSITORY RECTAL at 08:06

## 2018-06-26 RX ADMIN — CLORAZEPATE DIPOTASSIUM 7.5 MG: 3.75 TABLET ORAL at 08:06

## 2018-06-26 RX ADMIN — FAMOTIDINE 20 MG: 20 TABLET ORAL at 08:06

## 2018-06-26 RX ADMIN — HYDROCODONE BITARTRATE AND ACETAMINOPHEN 1 TABLET: 5; 325 TABLET ORAL at 01:06

## 2018-06-26 RX ADMIN — CEFAZOLIN SODIUM 1 G: 1 SOLUTION INTRAVENOUS at 10:06

## 2018-06-26 RX ADMIN — FERROUS SULFATE TAB EC 325 MG (65 MG FE EQUIVALENT) 325 MG: 325 (65 FE) TABLET DELAYED RESPONSE at 08:06

## 2018-06-26 NOTE — PLAN OF CARE
Problem: Physical Therapy Goal  Goal: Physical Therapy Goal  Goals to be met by: 2018     Patient will increase functional independence with mobility by performin. Supine to sit with Contact Guard Assistance  2. Sit to stand transfer with Contact Guard Assistance  3. Bed to chair transfer with Minimal Assistance using Rolling Walker  4. Gait  x 250 feet with Contact Guard Assistance using Rolling Walker.   5. Lower extremity exercise program x20 reps per handout, with assistance as needed     Outcome: Ongoing (interventions implemented as appropriate)  Pt presents sitting up in chair with  at bedside. Complaints of pain in knee but agreeable to PT services. Pt was able to tolerate transfers, gait 65ft x 2 SBA with RW. Demonstrates slow pace, step-to gait pattern, decreased step length and foot clearance. Pt may be experiencing more pain than she admits, nursing notified. Pt left sitting up in chair with needs in reach and lines intact. Nursing notified.

## 2018-06-26 NOTE — PT/OT/SLP PROGRESS
Physical Therapy Treatment    Patient Name:  Gloria Manuel   MRN:  [id number]    Recommendations:     Discharge Recommendations:  home, home health PT   Discharge Equipment Recommendations:     Barriers to discharge: None    Assessment:     Gloria Manuel is a 62 y.o. female admitted with a medical diagnosis of Status post total replacement of right hip.  She presents with the following impairments/functional limitations:  weakness, gait instability, impaired functional mobilty which limits safe functional mobility.    Rehab Prognosis:  GOOD; patient would benefit from acute skilled PT services to address these deficits and reach maximum level of function.      Recent Surgery: Procedure(s) (LRB):  ARTHROPLASTY, HIP (Right) 1 Day Post-Op    Plan:     During this hospitalization, patient to be seen BID to address the above listed problems via gait training, therapeutic activities, therapeutic exercises  · Plan of Care Expires:  07/06/18   Plan of Care Reviewed with: patient, spouse    Subjective     Communicated with nurse prior to session.  Patient found supine in bed with  at bedside upon PT entry to room, agreeable to treatment.      Chief Complaint: fatigue  Patient comments/goals: to return home  Pain/Comfort:  · Pain Rating 1: 0/10  · Location - Side 1: Right  · Location 1: hip  · Pain Addressed 1: Distraction    Patients cultural, spiritual, Restoration conflicts given the current situation:      Objective:     Patient found with: SCD, peripheral IV     General Precautions: Standard, fall   Orthopedic Precautions:RLE weight bearing as tolerated, RLE posterior precautions   Braces: N/A     Functional Mobility:  · Bed Mobility:     · Supine to Sit: contact guard assistance  · Sit to Supine: contact guard assistance  · Transfers:     · Sit to Stand:  stand by assistance with rolling walker  · Bed to Chair: stand by assistance with  rolling walker  using  Stand Pivot  · Gait: 250ft SBA with  RW; pace slow but improved from this am, step-to gait pattern      AM-PAC 6 CLICK MOBILITY  Turning over in bed (including adjusting bedclothes, sheets and blankets)?: 3  Sitting down on and standing up from a chair with arms (e.g., wheelchair, bedside commode, etc.): 3  Moving from lying on back to sitting on the side of the bed?: 3  Moving to and from a bed to a chair (including a wheelchair)?: 3  Need to walk in hospital room?: 3  Climbing 3-5 steps with a railing?: 1  Basic Mobility Total Score: 16       Therapeutic Activities and Exercises:       Patient left supine with all lines intact, call button in reach, nurse notified and  present..    GOALS:    Physical Therapy Goals     Not on file          Multidisciplinary Problems (Resolved)        Problem: Physical Therapy Goal    Goal Priority Disciplines Outcome Goal Variances Interventions   Physical Therapy Goal   (Resolved)     PT/OT, PT Outcome(s) achieved     Description:  Goals to be met by: 2018     Patient will increase functional independence with mobility by performin. Supine to sit with Contact Guard Assistance  2. Sit to stand transfer with Contact Guard Assistance  3. Bed to chair transfer with Minimal Assistance using Rolling Walker  4. Gait  x 250 feet with Contact Guard Assistance using Rolling Walker.   5. Lower extremity exercise program x20 reps per handout, with assistance as needed                      Time Tracking:     PT Received On: 18  PT Start Time: 1402     PT Stop Time: 1439  PT Total Time (min): 37 min     Billable Minutes: Gait Training 37    Treatment Type: Treatment  PT/PTA: PT           Dorota Garibay, PT  2018

## 2018-06-26 NOTE — PROGRESS NOTES
SSC contacted patient insurance regarding benefits for home health services.  Per rep, they patient should be billed for services.  However, they will reimburse patient.  Per PRIMITIVO Cloud sent home health referral to [facility] for pricing.  [facility] sent patient information to [facility] for home health services authorization and acceptance.PRIMITIVO Polanco    2:45pm SSC spoke to Samina with [facility] [phone] regarding pricing for home health services.  Per Samina, they contacted patient insurance who stated they do not have patient in their system. This SSC informed her that I sent over the correct phone number at 11:39am via [employer] to contact patient's insurance whom she spoke with to verify patient insurance.  Per Samina, she will contact their benefits person regarding pricing.  [facility] contacted [facility] [phone] regarding pricing.  No answer.  This SSC left voice message to return call.  SSC informed JERRELL Suarez.PRIMITIVO Polanco

## 2018-06-26 NOTE — PLAN OF CARE
06/26/18 1101   PRE-TX-O2-ETCO2   O2 Device (Oxygen Therapy) room air   SpO2 95 %   Pulse Oximetry Type Intermittent   $ Pulse Oximetry - Multiple Charge Pulse Oximetry - Multiple

## 2018-06-26 NOTE — PT/OT/SLP EVAL
Occupational Therapy   Evaluation and Discharge Note    Name: Gloria Manuel  MRN: [id number]  Admitting Diagnosis:  Status post total replacement of right hip 1 Day Post-Op    Recommendations:     Discharge Recommendations: home health PT  Discharge Equipment Recommendations:  shower chair  Barriers to discharge:  None    History:     Occupational Profile:  Living Environment: Pt lives with her  in a [facility] with 1 [address] and has a walk-in shower.  Previous level of function: Independent with all I/ADL's, driving and working as a realtor.  Roles and Routines: Wife,,other, grandmother, realtor  Equipment Owned:  hip kit, 3-in-1 commode, walker, rolling (all equipment is new)  Assistance upon Discharge: Her family will help her at home.    Past Medical History:   Diagnosis Date    Anxiety     Arthritis     Back pain     Depression     MVP (mitral valve prolapse)     RLS (restless legs syndrome)     Wears dentures     UPPER AND LOWER BRIDGE    Wears glasses        Past Surgical History:   Procedure Laterality Date    BACK SURGERY      nerve stimulator    HYSTERECTOMY      implanted spinal cord stimulator      NONFUNCTIONING X 15 YEARS    SACRAL NERVE STIMULATOR PLACEMENT Left     TONSILLECTOMY         Subjective     Chief Complaint: None  Patient/Family stated goals: I need to get my L shoulder replaced when my hip heals.  Communicated with: nurse Angie prior to session.  Pain/Comfort:  · Pain Rating 1: 0/10  · Location - Side 1: Right  · Location 1: knee  · Pain Rating Post-Intervention 1: 1/10    Patients cultural, spiritual, Mosque conflicts given the current situation:      Objective:     Patient found with: peripheral IV, telemetry    General Precautions: Standard, fall   Orthopedic Precautions:RLE weight bearing as tolerated, RLE posterior precautions   Braces: N/A     Occupational Performance:    Bed Mobility:    · Patient completed Scooting/Bridging with contact guard  assistance  · Patient completed Supine to Sit with minimum assistance and with side rail  · Patient completed Sit to Supine with minimum assistance and to lift R LE onto bed    Functional Mobility/Transfers:  · Patient completed Sit <> Stand Transfer with minimum assistance  with  rolling walker and cues for hand placement   · Patient completed Toilet Transfer Stand Pivot technique with contact guard assistance and cues for hand placement with  grab bars and rolling walker  · Functional Mobility: Pt walked from bedside to bathroom, then to sink & back to bedside using walker with CGA & no LOB noted.    Activities of Daily Living:  · Grooming: stand by assistance standing at sink to wash hands  · LB Dressing: stand by assistance and & cues for effective use of AE to don/doff socks and pull pants over feet at EOB.  · Toileting: stand by assistance for all tasks at toilet using grab bars and walker    Cognitive/Visual Perceptual:  Cognitive/Psychosocial Skills:     -       Oriented to: Person, Place, Time and Situation   -       Safety awareness/insight to disability: intact and a couple of cues to follow posterior hip precautions   Visual/Perceptual:      -Intact    Physical Exam:  Balance:    -       SBA static, CGA dynamic  Postural examination/scapula alignment:    -       Rounded shoulders  -       Forward head  Skin integrity: Visible skin intact  Edema:  None noted  Upper Extremity Range of Motion:     -       Right Upper Extremity: WFL  -       Left Upper Extremity: WFL except shoulder limited to 60* abduction from a recent injury  Upper Extremity Strength:    -       Right Upper Extremity: WFL  -       Left Upper Extremity: WFL except shoulder limited by pain    Patient left HOB elevated with all lines intact, call button in reach, Angie, nurse notified and  present    [facility] 6 Click:  [facility] Total Score: 19    Treatment & Education:  OT ed pt on OT role &  discharge recommendations.  OT ed pt on bed  "mobility techniques to increase independence with task.  OT ed patient on safety with walker use for functional mobility with cues for hand placement & sequencing.   OT educated pt on posterior hip precautions with instruction sheet and demonstration provided.  OT ed pt on use of adaptive equipment for LB dressing, bathing & safe item retrieval with reacher with demonstration provided.    Education:    Assessment:     Gloria Mnauel is a 62 y.o. female with a medical diagnosis of Status post total replacement of right hip. OT education completed on posterior hip precautions, LB ADLs with use of adaptive equipment and on safety with all ADL tasks using assistive devices with pt able to provide return demonstration. No further OT needs at this time.    Clinical Decision Makin.  OT Low:  "Pt evaluation falls under low complexity for evaluation coding due to performance deficits noted in 1-3 areas as stated above and 0 co-morbities affecting current functional status. Data obtained from problem focused assessments. No modifications or assistance was required for completion of evaluation. Only brief occupational profile and history review completed."     Plan:     During this hospitalization, patient does not require further acute OT services.  Please re-consult if situation changes.    · Plan of Care Reviewed with: patient, spouse        Time Tracking:     OT Date of Treatment: 18  OT Start Time: 1133  OT Stop Time: 1205  OT Total Time (min): 32 min    Billable Minutes:Evaluation 8  Self Care/Home Management 24    RA Olmos  2018    "

## 2018-06-26 NOTE — NURSING
Scott catheter discontinued .Instructed to call when she has to urinate. Verbalized understanding. Call light in reach.

## 2018-06-26 NOTE — PLAN OF CARE
After [facility]fully [facility] (ph#[phone]) by myself and [facility] I called Dr Ayesha Huang's nurse regarding the discharge plan.   She states we can discharge the pt and she will follow up with the pt along with [facility] regarding the pt's outpt PT.   I notified the pt of the above plan, she is in agreement....ELGIN Howell CM

## 2018-06-26 NOTE — PLAN OF CARE
Problem: Patient Care Overview  Goal: Plan of Care Review  Outcome: Ongoing (interventions implemented as appropriate)  Plan of care reviewed with patient. Surgical dressing to right hip clean, dry & intact. Scott catheter intact & patent.catheter to be d/c'd @6am. IV antibiotics given as per orders. Medicated for pain once this shift, full relief obtained. Ice pack to R hip in place. Scd/ foot pump in use. Remains free from falls/injury. Instructed patient to call for assistance as needed during night, verbalized understanding. Call light in reach, bed in low & locked position.

## 2018-06-26 NOTE — PROGRESS NOTES
"[facility]  Orthopedics  Progress Note    Patient Name: Gloria Manuel  MRN: [id number]  Admission Date: 6/25/2018  Hospital Length of Stay: 1 days  Attending Provider: Anju Mejias MD  Primary Care Provider: Mario Alberto Byrne MD  Follow-up For: Procedure(s) (LRB):  ARTHROPLASTY, HIP (Right)    Post-Operative Day: 1 Day Post-Op  Subjective:     Principal Problem:Status post total replacement of right hip    Principal Orthopedic Problem: S/P R DARNELL    Interval History: none    Review of patient's allergies indicates:   Allergen Reactions    Morphine Swelling     NECK SWELLED    Demerol (pf) [meperidine (pf)] Other (See Comments)     HEART RACES, BOUNCES OFF WALL    Erythromycin Itching       Current Facility-Administered Medications   Medication    aspirin tablet 325 mg    bisacodyl suppository 10 mg    ceFAZolin (ANCEF) 1 gram in dextrose 5 % 50 mL IVPB (premix)    clorazepate tablet 7.5 mg    famotidine tablet 20 mg    ferrous sulfate EC tablet 325 mg    HYDROcodone-acetaminophen 5-325 mg per tablet 1 tablet    lactated ringers infusion    mupirocin 2 % ointment 1 g    ondansetron disintegrating tablet 8 mg    rOPINIRole tablet 1 mg    simvastatin tablet 20 mg    sodium chloride 0.9% flush 5 mL    zolpidem tablet 5 mg     Objective:     Vital Signs (Most Recent):  Temp: 97.7 °F (36.5 °C) (06/26/18 0441)  Pulse: 75 (06/26/18 0441)  Resp: 20 (06/26/18 0441)  BP: (!) 111/51 (06/26/18 0441)  SpO2: (!) 94 % (06/26/18 0441) Vital Signs (24h Range):  Temp:  [96.3 °F (35.7 °C)-98.4 °F (36.9 °C)] 97.7 °F (36.5 °C)  Pulse:  [64-92] 75  Resp:  [12-20] 20  SpO2:  [91 %-97 %] 94 %  BP: ()/(49-66) 111/51     Weight: 89.8 kg (198 lb)  Height: 5' 6" (167.6 cm)  Body mass index is 31.96 kg/m².      Intake/Output Summary (Last 24 hours) at 06/26/18 0649  Last data filed at 06/26/18 0400   Gross per 24 hour   Intake          3343.75 ml   Output             2050 ml   Net          1293.75 ml "       General    Vitals reviewed.  Constitutional: She is oriented to person, place, and time. She appears well-developed and well-nourished.   Abdominal: Soft. Bowel sounds are normal.   Neurological: She is alert and oriented to person, place, and time.   Psychiatric: She has a normal mood and affect. Her behavior is normal.             Right Hip Exam     Comments:  RLE DNVI. Dressings clean and dry        Significant Labs:   CBC:   Recent Labs  Lab 06/25/18  1300 06/26/18  0435   WBC 12.40 14.00*   HGB 11.9* 12.1   HCT 35.4* 35.8*    236     CMP:   Recent Labs  Lab 06/26/18  0435      K 4.1      CO2 26   *   BUN 12   CREATININE 0.7   CALCIUM 9.5   ANIONGAP 8   EGFRNONAA >60     All pertinent labs within the past 24 hours have been reviewed.    Significant Imaging: X-Ray: I have reviewed all pertinent results/findings and my personal findings are:  The patient has undergone a right hip arthroplasty with    Assessment/Plan:     * Status post total replacement of right hip    OOB with PT and nursing  Change dressing today @ 1500  May be DC'd home with HH today or tomorrow              MARCELLO KLEIN  Orthopedics  [facility]

## 2018-06-26 NOTE — PLAN OF CARE
06/26/18 1026   Discharge Assessment   Assessment Type Discharge Planning Assessment   Confirmed/corrected address and phone number on facesheet? Yes   Assessment information obtained from? Patient   Expected Length of Stay (days) 2   Communicated expected length of stay with patient/caregiver yes   Prior to hospitilization cognitive status: Alert/Oriented   Prior to hospitalization functional status: Independent   Current cognitive status: Alert/Oriented   Current Functional Status: Assistive Equipment;Needs Assistance   Lives With spouse  ([place] (ph#[phone]))   Able to Return to Prior Arrangements yes   Patient's perception of discharge disposition admitted as an inpatient   Readmission Within The Last 30 Days no previous admission in last 30 days   Patient currently being followed by outpatient case management? No   Patient currently receives any other outside agency services? No   Equipment Currently Used at Home 3-in-1 commode;walker, rolling   Do you have any problems affording any of your prescribed medications? No  (pharm: [facility] [place] )   Is the patient taking medications as prescribed? yes   Does the patient have transportation home? Yes   Transportation Available family or friend will provide   Does the patient receive services at the [facility]? No   Discharge Plan A Home   Discharge Plan B Home Health   Patient/Family In Agreement With Plan yes     Patient is aware that her insurance plan does not cover home health, they would have to pay up front then be reimbursed by the plan. Leo, ortho navigator spoke with the pt this morning who has agreed to pay if Dr Hodge wants her to have home health. Leo is calling Dr Hodge's office and will let me know if she needs a home health order.  Patient signed disclosure/patient choice for [facility] and I placed in blue folder.....ELGIN Howell CM

## 2018-06-26 NOTE — PT/OT/SLP PROGRESS
Physical Therapy Treatment    Patient Name:  Gloria Manuel   MRN:  [id number]    Recommendations:     Discharge Recommendations:  home, home health PT   Discharge Equipment Recommendations:     Barriers to discharge: None    Assessment:     Gloria Manuel is a 62 y.o. female admitted with a medical diagnosis of Status post total replacement of right hip.  She presents with the following impairments/functional limitations:  weakness, impaired endurance, impaired functional mobilty, gait instability, impaired balance, decreased safety awareness, pain which decreases safe functional mobility.    Rehab Prognosis:  GOOD; patient would benefit from acute skilled PT services to address these deficits and reach maximum level of function.      Recent Surgery: Procedure(s) (LRB):  ARTHROPLASTY, HIP (Right) 1 Day Post-Op    Plan:     During this hospitalization, patient to be seen BID to address the above listed problems via gait training, therapeutic activities, therapeutic exercises  · Plan of Care Expires:  07/06/18   Plan of Care Reviewed with: patient, spouse    Subjective     Communicated with nurse Adams prior to session.  Patient found sitting in chair upon PT entry to room, agreeable to treatment.      Chief Complaint: knee pain  Patient comments/goals: to return home  Pain/Comfort:  · Pain Rating 1: 4/10  · Location - Side 1: Right  · Location 1: knee  · Pain Addressed 1: Distraction    Patients cultural, spiritual, Jew conflicts given the current situation:      Objective:     Patient found with: telemetry, peripheral IV     General Precautions: Standard, fall   Orthopedic Precautions:RLE posterior precautions   Braces: N/A     Functional Mobility:  · Bed Mobility:     · Rolling Left:  independence  · Transfers:     · Sit to Stand:  contact guard assistance with rolling walker  · Bed to Chair: contact guard assistance with  rolling walker  using  Stand Pivot  · Gait: 65 ft x 2 SBA with  RW      AM-PAC 6 CLICK MOBILITY  Turning over in bed (including adjusting bedclothes, sheets and blankets)?: 3  Sitting down on and standing up from a chair with arms (e.g., wheelchair, bedside commode, etc.): 3  Moving from lying on back to sitting on the side of the bed?: 3  Moving to and from a bed to a chair (including a wheelchair)?: 3  Need to walk in hospital room?: 3  Climbing 3-5 steps with a railing?: 1  Basic Mobility Total Score: 16       Therapeutic Activities and Exercises:       Patient left up in chair with all lines intact, call button in reach, nurse notified and  present..    GOALS:    Physical Therapy Goals        Problem: Physical Therapy Goal    Goal Priority Disciplines Outcome Goal Variances Interventions   Physical Therapy Goal     PT/OT, PT Ongoing (interventions implemented as appropriate)     Description:  Goals to be met by: 2018     Patient will increase functional independence with mobility by performin. Supine to sit with Contact Guard Assistance  2. Sit to stand transfer with Contact Guard Assistance  3. Bed to chair transfer with Minimal Assistance using Rolling Walker  4. Gait  x 250 feet with Contact Guard Assistance using Rolling Walker.   5. Lower extremity exercise program x20 reps per handout, with assistance as needed                      Time Tracking:     PT Received On: 18  PT Start Time: 0955     PT Stop Time: 1023  PT Total Time (min): 28 min     Billable Minutes: Gait Training 16 and Therapeutic Activity 12    Treatment Type: Treatment  PT/PTA: PT           Dorota Garibay, PT  2018

## 2018-06-26 NOTE — SUBJECTIVE & OBJECTIVE
"Principal Problem:Status post total replacement of right hip    Principal Orthopedic Problem: S/P R DARNELL    Interval History: none    Review of patient's allergies indicates:   Allergen Reactions    Morphine Swelling     NECK SWELLED    Demerol (pf) [meperidine (pf)] Other (See Comments)     HEART RACES, BOUNCES OFF WALL    Erythromycin Itching       Current Facility-Administered Medications   Medication    aspirin tablet 325 mg    bisacodyl suppository 10 mg    ceFAZolin (ANCEF) 1 gram in dextrose 5 % 50 mL IVPB (premix)    clorazepate tablet 7.5 mg    famotidine tablet 20 mg    ferrous sulfate EC tablet 325 mg    HYDROcodone-acetaminophen 5-325 mg per tablet 1 tablet    lactated ringers infusion    mupirocin 2 % ointment 1 g    ondansetron disintegrating tablet 8 mg    rOPINIRole tablet 1 mg    simvastatin tablet 20 mg    sodium chloride 0.9% flush 5 mL    zolpidem tablet 5 mg     Objective:     Vital Signs (Most Recent):  Temp: 97.7 °F (36.5 °C) (06/26/18 0441)  Pulse: 75 (06/26/18 0441)  Resp: 20 (06/26/18 0441)  BP: (!) 111/51 (06/26/18 0441)  SpO2: (!) 94 % (06/26/18 0441) Vital Signs (24h Range):  Temp:  [96.3 °F (35.7 °C)-98.4 °F (36.9 °C)] 97.7 °F (36.5 °C)  Pulse:  [64-92] 75  Resp:  [12-20] 20  SpO2:  [91 %-97 %] 94 %  BP: ()/(49-66) 111/51     Weight: 89.8 kg (198 lb)  Height: 5' 6" (167.6 cm)  Body mass index is 31.96 kg/m².      Intake/Output Summary (Last 24 hours) at 06/26/18 0649  Last data filed at 06/26/18 0400   Gross per 24 hour   Intake          3343.75 ml   Output             2050 ml   Net          1293.75 ml       General    Vitals reviewed.  Constitutional: She is oriented to person, place, and time. She appears well-developed and well-nourished.   Abdominal: Soft. Bowel sounds are normal.   Neurological: She is alert and oriented to person, place, and time.   Psychiatric: She has a normal mood and affect. Her behavior is normal.             Right Hip Exam     Comments: "  RLE DNVI. Dressings clean and dry        Significant Labs:   CBC:   Recent Labs  Lab 06/25/18  1300 06/26/18  0435   WBC 12.40 14.00*   HGB 11.9* 12.1   HCT 35.4* 35.8*    236     CMP:   Recent Labs  Lab 06/26/18  0435      K 4.1      CO2 26   *   BUN 12   CREATININE 0.7   CALCIUM 9.5   ANIONGAP 8   EGFRNONAA >60     All pertinent labs within the past 24 hours have been reviewed.    Significant Imaging: X-Ray: I have reviewed all pertinent results/findings and my personal findings are:  The patient has undergone a right hip arthroplasty with

## 2018-06-27 ENCOUNTER — TELEPHONE (OUTPATIENT)
Dept: ORTHOPEDICS | Facility: CLINIC | Age: 63
End: 2018-06-27

## 2018-06-27 NOTE — DISCHARGE SUMMARY
Discharge Summary  Hospital Medicine    Admit Date: 6/25/2018    Date and Time: 6/26/201810:18 PM    Discharge Attending Physician: Anju Mejias MD    Primary Care Physician: Mario Alberto Byrne MD    Diagnoses:  Active Hospital Problems    Diagnosis  POA    *Status post total replacement of right hip [Z96.641]  Not Applicable    Osteoarthritis of right hip [M16.11]  Yes    Depression [F32.9]  Yes    RLS (restless legs syndrome) [G25.81]  Yes      Resolved Hospital Problems    Diagnosis Date Resolved POA   No resolved problems to display.     Discharged Condition: Good    Hospital Course:   Patient is a 62 y.o. female admitted to Hospitalist Service from Operation Room s/p right total hip arthroplasty performed by Dr. Hodge. Patient reportedly has past medical history significant for OA, depression, RLS and anxiety disorder. Post-operatively, patient denied chest pain, shortness of breath, abdominal pain, nausea, vomiting, headache, vision changes, focal neuro-deficits, cough or fever. Patient was admitted to Hospitalist medicine service. Patient was followed by orthopedics. Post-operative, patient did well. Pain adequately controlled. Patient participated with physical therapy. Home health and outpatient physical therapy has been arranged. Fall precautions discussed with the patient. Patient to follow up with primary care physician next week and orthopedic doctor in 2 weeks. Post-operative anti-coagulation as per orthopedics recommendations advised. In case of chest pain, shortness of breath, stroke or stroke like symptoms, high grade fever or any signs or symptoms of surgical site wound infection symptoms, patient to return to nearest emergency room as soon as possible. Patient was discharged home in stable condition with following discharge plan of care.     Consults: Dr. Hodge    Significant Diagnostic Studies:   Right hip x-ray: Status post right hip arthroplasty.    Microbiology Results (last 7 days)     **  No results found for the last 168 hours. **        Special Treatments/Procedures: as above  Disposition: Home or Self Care    Medications:  Reconciled Home Medications: Discharge Medication List as of 6/26/2018  3:57 PM      START taking these medications    Details   aspirin 325 MG tablet Take 1 tablet (325 mg total) by mouth 2 (two) times daily., Starting Tue 6/26/2018, Until Thu 7/26/2018, No Print         CONTINUE these medications which have NOT CHANGED    Details   clorazepate (TRANXENE) 7.5 MG Tab clorazepate dipotassium 7.5 mg tablet   Take 1 tablet 3 times a day by oral route as needed., Historical Med      omeprazole (PRILOSEC) 10 MG capsule Take 10 mg by mouth once daily., Historical Med      ranitidine (ZANTAC) 300 MG capsule Take 300 mg by mouth 2 (two) times daily. , Historical Med      rOPINIRole (REQUIP) 1 MG tablet ropinirole 1 mg tablet   Take 1 tablet every day by oral route at bedtime., Historical Med      simvastatin (ZOCOR) 20 MG tablet simvastatin 20 mg tablet   Take 1 tablet every day by oral route in the morning., Historical Med      traMADol (ULTRAM) 50 mg tablet Take 50 mg by mouth every 6 (six) hours as needed for Pain., Historical Med         STOP taking these medications       ferrous sulfate 325 mg (65 mg iron) Tab tablet Comments:   Reason for Stopping:               Discharge Procedure Orders  Ambulatory referral to Home Health   Referral Priority: Routine Referral Type: Home Health   Referral Reason: Specialty Services Required    Requested Specialty: Home Health Services    Number of Visits Requested: 1      Ambulatory consult to Physical Therapy   Referral Priority: Routine Referral Type: Physical Medicine   Referral Reason: Specialty Services Required    Requested Specialty: Physical Therapy    Number of Visits Requested: 1      Diet Cardiac     Call MD for:   Order Comments: For worsening symptoms, chest pain, shortness of breath, increased abdominal pain, high grade fever,  stroke or stroke like symptoms, immediately go to the nearest Emergency Room or call 911 as soon as possible.     Other restrictions (specify):   Scheduling Instructions: Fall precautions, hip precautions.       Follow-up Information     Prosper Hodge MD On 7/10/2018.    Specialties:  Orthopedic Surgery, General Surgery, Surgery  Why:  @2:00pm  Contact information:  [address]  [address] [address]  [phone]             Mario Alberto Byrne MD In 1 week.    Specialty:  Family Medicine  Contact information:  [address] [place] [address]  [phone]             [facility] .    Contact information:  [phone]

## 2018-06-27 NOTE — TELEPHONE ENCOUNTER
----- Message from Regino Owens sent at 6/27/2018 10:57 AM CDT -----  Patient called she's slightly confused about her home health [phone]

## 2018-06-27 NOTE — TELEPHONE ENCOUNTER
Spoke with pt, explained that the hospital called before her d/c and decided to send her to out-pt PT, instead of home health since she really has no insurance. It would be cost effective for her.

## 2018-06-27 NOTE — PLAN OF CARE
06/27/18 0950   Final Note   Assessment Type Final Discharge Note   Discharge Disposition Home

## 2018-07-10 ENCOUNTER — OFFICE VISIT (OUTPATIENT)
Dept: ORTHOPEDICS | Facility: CLINIC | Age: 63
End: 2018-07-10

## 2018-07-10 VITALS
DIASTOLIC BLOOD PRESSURE: 70 MMHG | WEIGHT: 201 LBS | BODY MASS INDEX: 32.3 KG/M2 | HEIGHT: 66 IN | SYSTOLIC BLOOD PRESSURE: 106 MMHG

## 2018-07-10 DIAGNOSIS — Z96.641 STATUS POST TOTAL REPLACEMENT OF RIGHT HIP: Primary | ICD-10-CM

## 2018-07-10 PROCEDURE — 73502 X-RAY EXAM HIP UNI 2-3 VIEWS: CPT | Mod: RT,,, | Performed by: ORTHOPAEDIC SURGERY

## 2018-07-10 PROCEDURE — 99024 POSTOP FOLLOW-UP VISIT: CPT | Mod: ,,, | Performed by: ORTHOPAEDIC SURGERY

## 2018-07-10 RX ORDER — CYCLOBENZAPRINE HCL 10 MG
10 TABLET ORAL 3 TIMES DAILY PRN
COMMUNITY
End: 2020-03-02

## 2018-07-10 RX ORDER — CITALOPRAM 10 MG/1
10 TABLET ORAL DAILY
COMMUNITY
End: 2020-03-02

## 2018-07-10 RX ORDER — OXYCODONE AND ACETAMINOPHEN 7.5; 325 MG/1; MG/1
TABLET ORAL
Refills: 0 | COMMUNITY
Start: 2018-06-26 | End: 2020-03-02

## 2018-07-10 NOTE — PROGRESS NOTES
[facility] ORTHOPEDICS POST-OP NOTE    Subjective:           Chief Complaint:   Chief Complaint   Patient presents with    Right Hip - Post-op Evaluation     R DARNELL 6-25-18. Still having soreness and stiffness. She is in P.T       Past Medical History:   Diagnosis Date    Anxiety     Arthritis     Back pain     Depression     MVP (mitral valve prolapse)     RLS (restless legs syndrome)     Wears dentures     UPPER AND LOWER BRIDGE    Wears glasses        Past Surgical History:   Procedure Laterality Date    BACK SURGERY      nerve stimulator    HIP ARTHROPLASTY Right 6/25/2018    Procedure: ARTHROPLASTY, HIP;  Surgeon: Prosper oHdge MD;  Location: [facility], [facility];  Service: Orthopedics;  Laterality: Right;  william    HYSTERECTOMY      implanted spinal cord stimulator      NONFUNCTIONING X 15 YEARS    SACRAL NERVE STIMULATOR PLACEMENT Left     TONSILLECTOMY         Current Outpatient Prescriptions   Medication Sig    aspirin 325 MG tablet Take 1 tablet (325 mg total) by mouth 2 (two) times daily.    citalopram (CELEXA) 10 MG tablet Take 10 mg by mouth once daily.    clorazepate (TRANXENE) 7.5 MG Tab clorazepate dipotassium 7.5 mg tablet   Take 1 tablet 3 times a day by oral route as needed.    cyclobenzaprine (FLEXERIL) 10 MG tablet Take 10 mg by mouth 3 (three) times daily as needed for Muscle spasms.    omeprazole (PRILOSEC) 10 MG capsule Take 10 mg by mouth once daily.    oxyCODONE-acetaminophen (PERCOCET) 7.5-325 mg per tablet TK 1 T PO  Q 6 H PRN    ranitidine (ZANTAC) 300 MG capsule Take 300 mg by mouth 2 (two) times daily.     rOPINIRole (REQUIP) 1 MG tablet ropinirole 1 mg tablet   Take 1 tablet every day by oral route at bedtime.    simvastatin (ZOCOR) 20 MG tablet simvastatin 20 mg tablet   Take 1 tablet every day by oral route in the morning.    traMADol (ULTRAM) 50 mg tablet Take 50 mg by mouth every 6 (six) hours as needed for Pain.     No current facility-administered medications for this  visit.        Review of patient's allergies indicates:   Allergen Reactions    Morphine Swelling     NECK SWELLED    Demerol (pf) [meperidine (pf)] Other (See Comments)     HEART RACES, BOUNCES OFF WALL    Erythromycin Itching       History reviewed. No pertinent family history.    Social History     Social History    Marital status:      Spouse name: N/A    Number of children: N/A    Years of education: N/A     Occupational History    Not on file.     Social History Main Topics    Smoking status: Former Smoker    Smokeless tobacco: Never Used    Alcohol use Yes      Comment: RARELY    Drug use: No    Sexual activity: Not on file     Other Topics Concern    Not on file     Social History Narrative    No narrative on file       History of present illness: Patient returns status post total hip arthroplasty. She is doing very well.      Review of Systems:    Musculoskeletal:  See HPI      Objective:        Physical Examination:    Vital Signs:    Vitals:    07/10/18 1417   BP: 106/70       Body mass index is 32.44 kg/m².    This a well-developed, well nourished patient in no acute distress.  They are alert and oriented and cooperative to examination.        Patient has full range of motion of her hip. Leg lengths are symmetric. Her wounds are clean dry and intact. Calf is soft  Pertinent New Results:    XRAY Report / Interpretation:   AP of the right hip done in the office today demonstrates her total hip arthroplasty be in ideal position.    Assessment/Plan:      Stable following total hip arthroplasty. Patient directed strengthening program. Follow-up in 2 months. He may return to work when she wishes to    This note was created using Dragon voice recognition software that occasionally misinterpreted phrases or words.

## 2018-07-25 ENCOUNTER — TELEPHONE (OUTPATIENT)
Dept: ORTHOPEDICS | Facility: CLINIC | Age: 63
End: 2018-07-25

## 2018-07-25 NOTE — TELEPHONE ENCOUNTER
----- Message from Ginna Toro sent at 7/25/2018  2:42 PM CDT -----  Contact: Pt called [phone]  Pt would like to know when can she stop useing her walker? Rt hip surgery 6/25/18.

## 2018-07-25 NOTE — TELEPHONE ENCOUNTER
Spoke with pt, informed her she can get off her walker since it has been 4 weeks since her surgery

## 2018-09-05 ENCOUNTER — TELEPHONE (OUTPATIENT)
Dept: ORTHOPEDICS | Facility: CLINIC | Age: 63
End: 2018-09-05

## 2018-09-05 DIAGNOSIS — Z96.641 STATUS POST TOTAL REPLACEMENT OF RIGHT HIP: Primary | ICD-10-CM

## 2018-09-13 ENCOUNTER — OFFICE VISIT (OUTPATIENT)
Dept: ORTHOPEDICS | Facility: CLINIC | Age: 63
End: 2018-09-13
Payer: COMMERCIAL

## 2018-09-13 VITALS
SYSTOLIC BLOOD PRESSURE: 125 MMHG | BODY MASS INDEX: 32.3 KG/M2 | HEART RATE: 84 BPM | WEIGHT: 201 LBS | DIASTOLIC BLOOD PRESSURE: 79 MMHG | HEIGHT: 66 IN

## 2018-09-13 DIAGNOSIS — Z96.641 STATUS POST TOTAL REPLACEMENT OF RIGHT HIP: Primary | ICD-10-CM

## 2018-09-13 PROCEDURE — 99024 POSTOP FOLLOW-UP VISIT: CPT | Mod: ,,, | Performed by: ORTHOPAEDIC SURGERY

## 2018-09-13 RX ORDER — GABAPENTIN 300 MG/1
300 CAPSULE ORAL 3 TIMES DAILY
COMMUNITY
End: 2020-03-02

## 2018-09-13 RX ORDER — FERROUS SULFATE 324(65)MG
325 TABLET, DELAYED RELEASE (ENTERIC COATED) ORAL DAILY
COMMUNITY
End: 2021-05-25

## 2018-09-13 NOTE — PROGRESS NOTES
[facility] ORTHOPEDICS POST-OP NOTE    Subjective:           Chief Complaint:   Chief Complaint   Patient presents with    Right Hip - Post-op Evaluation     Right DARNELL 06/25/18. States that her hip is doing good. However it takes her a few seconds to start moving and that she has a pressure like feeling in her lower leg.        Past Medical History:   Diagnosis Date    Anxiety     Arthritis     Back pain     Depression     MVP (mitral valve prolapse)     RLS (restless legs syndrome)     Wears dentures     UPPER AND LOWER BRIDGE    Wears glasses        Past Surgical History:   Procedure Laterality Date    ARTHROPLASTY, HIP Right 6/25/2018    Performed by [facility]per [facility] at [facility]    BACK SURGERY      nerve stimulator    HIP ARTHROPLASTY Right 6/25/2018    Procedure: ARTHROPLASTY, HIP;  Surgeon: Prosper Hodge MD;  Location: [facility];  Service: Orthopedics;  Laterality: Right;  william    HYSTERECTOMY      implanted spinal cord stimulator      NONFUNCTIONING X 15 YEARS    SACRAL NERVE STIMULATOR PLACEMENT Left     TONSILLECTOMY         Current Outpatient Medications   Medication Sig    clorazepate (TRANXENE) 7.5 MG Tab clorazepate dipotassium 7.5 mg tablet   Take 1 tablet 3 times a day by oral route as needed.    cyclobenzaprine (FLEXERIL) 10 MG tablet Take 10 mg by mouth 3 (three) times daily as needed for Muscle spasms.    ferrous sulfate 324 mg (65 mg iron) TbEC Take 325 mg by mouth once daily.    gabapentin (NEURONTIN) 300 MG capsule Take 300 mg by mouth 3 (three) times daily.    omeprazole (PRILOSEC) 10 MG capsule Take 10 mg by mouth once daily.    ranitidine (ZANTAC) 300 MG capsule Take 300 mg by mouth 2 (two) times daily.     rOPINIRole (REQUIP) 1 MG tablet ropinirole 1 mg tablet   Take 1 tablet every day by oral route at bedtime.    simvastatin (ZOCOR) 20 MG tablet simvastatin 20 mg tablet   Take 1 tablet every day by oral route in the morning.    traMADol (ULTRAM) 50 mg tablet Take 50  mg by mouth every 6 (six) hours as needed for Pain.    citalopram (CELEXA) 10 MG tablet Take 10 mg by mouth once daily.    oxyCODONE-acetaminophen (PERCOCET) 7.5-325 mg per tablet TK 1 T PO  Q 6 H PRN     No current facility-administered medications for this visit.        Review of patient's allergies indicates:   Allergen Reactions    Morphine Swelling     NECK SWELLED    Demerol (pf) [meperidine (pf)] Other (See Comments)     HEART RACES, BOUNCES OFF WALL    Erythromycin Itching       History reviewed. No pertinent family history.    Social History     Socioeconomic History    Marital status:      Spouse name: Not on file    Number of children: Not on file    Years of education: Not on file    Highest education level: Not on file   Social Needs    Financial resource strain: Not on file    Food insecurity - worry: Not on file    Food insecurity - inability: Not on file    Transportation needs - medical: Not on file    Transportation needs - non-medical: Not on file   Occupational History    Not on file   Tobacco Use    Smoking status: Former Smoker    Smokeless tobacco: Never Used   Substance and Sexual Activity    Alcohol use: Yes     Comment: RARELY    Drug use: No    Sexual activity: Not on file   Other Topics Concern    Not on file   Social History Narrative    Not on file       History of present illness: Patient comes in today for her right hip. She is generally doing very well really not having any issues.      Review of Systems:    Musculoskeletal:  See HPI      Objective:        Physical Examination:    Vital Signs:    Vitals:    09/13/18 0922   BP: 125/79   Pulse: 84       Body mass index is 32.44 kg/m².    This a well-developed, well nourished patient in no acute distress.  They are alert and oriented and cooperative to examination.        Leg lengths are symmetric. She is neurovascular intact foot.  Pertinent New Results:    XRAY Report / Interpretation:   AP and lateral the  right hip demonstrates her right total hip arthroplasty be in ideal position. No evidence of loosening    Assessment/Plan:      Stable following right total hip. Follow-up when necessary    This note was created using Dragon voice recognition software that occasionally misinterpreted phrases or words.

## 2019-11-01 LAB — HCV AB SER-ACNC: NEGATIVE

## 2020-01-24 DIAGNOSIS — E04.2 NONTOXIC MULTINODULAR GOITER: Primary | ICD-10-CM

## 2020-01-27 ENCOUNTER — HOSPITAL ENCOUNTER (OUTPATIENT)
Dept: RADIOLOGY | Facility: HOSPITAL | Age: 65
Discharge: HOME | End: 2020-01-27
Attending: SURGERY
Payer: MEDICARE

## 2020-01-27 DIAGNOSIS — E04.2 NONTOXIC MULTINODULAR GOITER: ICD-10-CM

## 2020-01-27 PROCEDURE — 76536 US EXAM OF HEAD AND NECK: CPT | Mod: TC,PO

## 2020-03-02 ENCOUNTER — HOSPITAL ENCOUNTER (OUTPATIENT)
Dept: RADIOLOGY | Facility: HOSPITAL | Age: 65
Discharge: HOME | End: 2020-03-02
Attending: SURGERY
Payer: MEDICARE

## 2020-03-02 ENCOUNTER — HOSPITAL ENCOUNTER (OUTPATIENT)
Dept: PREADMISSION TESTING | Facility: HOSPITAL | Age: 65
Discharge: HOME | End: 2020-03-02
Attending: SURGERY
Payer: MEDICARE

## 2020-03-02 VITALS
HEIGHT: 66 IN | RESPIRATION RATE: 16 BRPM | WEIGHT: 196.19 LBS | SYSTOLIC BLOOD PRESSURE: 134 MMHG | HEART RATE: 68 BPM | BODY MASS INDEX: 31.53 KG/M2 | TEMPERATURE: 98 F | OXYGEN SATURATION: 96 % | DIASTOLIC BLOOD PRESSURE: 81 MMHG

## 2020-03-02 DIAGNOSIS — Z01.818 PREOP TESTING: Primary | ICD-10-CM

## 2020-03-02 DIAGNOSIS — E04.2 NONTOXIC MULTINODULAR GOITER: Primary | ICD-10-CM

## 2020-03-02 DIAGNOSIS — E04.2 NONTOXIC MULTINODULAR GOITER: ICD-10-CM

## 2020-03-02 PROCEDURE — 93005 ELECTROCARDIOGRAM TRACING: CPT

## 2020-03-02 PROCEDURE — 71046 X-RAY EXAM CHEST 2 VIEWS: CPT | Mod: TC

## 2020-03-02 RX ORDER — CLONAZEPAM 0.5 MG/1
0.5 TABLET ORAL 3 TIMES DAILY
COMMUNITY
End: 2021-05-25

## 2020-03-02 NOTE — DISCHARGE INSTRUCTIONS
To confirm, Your doctor has instructed you that surgery is scheduled for:     Please report to [facility] [address] the morning of surgery.     Pre-Op will call the afternoon prior to surgery between 4:00 and 6:00 PM with the final arrival time.      PLEASE NOTE:  The surgery schedule has many variables which may affect the time of your surgery case.  Family members should be available if your surgery time changes.  Plan to be here the day of your procedure between 4-6 hours.    MEDICATIONS:  TAKE ONLY THESE MEDICATIONS WITH A SMALL SIP OF WATER THE MORNING OF YOUR PROCEDURE:    Klonipin, Zantac, Prilosec       DO NOT TAKE THESE MEDICATIONS 5-7 DAYS PRIOR to your procedure or per your surgeon's request: ASPIRIN, ALEVE, ADVIL, IBUPROFEN, FISH OIL VITAMIN E, HERBALS  (May take Tylenol)    ONLY if you are prescribed any types of blood thinners such as:  Aspirin, Coumadin, Plavix, Pradaxa, Xarelto, Aggrenox, Effient, Eliquis, Savasya, Brilinta, or any other, ask your surgeon whether you should stop taking them and how long before surgery you should stop.  You may also need to verify with the prescribing physician if it is ok to stop your medication.      INSTRUCTIONS IMPORTANT!!  · Do not eat or drink anything between midnight and the time of your procedure- this includes gum, mints, and candy.  · ONLY if you are diabetic, check your sugar in the morning before your procedure.  · Do not smoke, vape or drink alcoholic beverages 24 hours prior to your procedure.  · Shower the night before AND the morning of your procedure with a Chlorhexidine wash such as Hibiclens or Dial antibacterial soap from the neck down.  Do not get it on your face or in your eyes.  You may use your own shampoo and face wash. This helps your skin to be as bacteria free as possible.    · If you wear contact lenses, dentures, hearing aids or glasses, bring a container to put them in during surgery and give to a family member for safe  keeping.  Please leave all jewelry, piercing's and valuables at home.   · DO NOT remove hair from the surgery site.  Do not shave the incision site unless you are given specific instructions to do so.    · ONLY if you have been diagnosed with sleep apnea please bring your C-PAP machine.  · ONLY if you wear home oxygen please bring your portable oxygen tank the day of your procedure.   · ONLY for patients requiring bowel prep, written instructions will be given by your doctor's office.  · ONLY if you have a neuro stimulator, please bring the controller with you the morning of surgery  · ONLY if a type and screen test is needed before surgery, please return:  · If your doctor has scheduled you for an overnight stay, bring a small overnight bag with any personal items you need.  · Make arrangements in advance for transportation home by a responsible adult.  · You must make arrangements for transportation, TAXI'S, UBER'S OR LYFTS ARE NOT ALLOWED.          If you have any questions about these instructions, call Pre-Op Admit  Nursing at [phone] or the Pre-Op Day Surgery Unit at [phone].

## 2020-03-09 ENCOUNTER — ANESTHESIA EVENT (OUTPATIENT)
Dept: SURGERY | Facility: HOSPITAL | Age: 65
End: 2020-03-09
Payer: MEDICARE

## 2020-03-09 ENCOUNTER — HOSPITAL ENCOUNTER (OUTPATIENT)
Facility: HOSPITAL | Age: 65
Discharge: HOME | End: 2020-03-10
Attending: SURGERY | Admitting: SURGERY
Payer: MEDICARE

## 2020-03-09 ENCOUNTER — ANESTHESIA (OUTPATIENT)
Dept: SURGERY | Facility: HOSPITAL | Age: 65
End: 2020-03-09
Payer: MEDICARE

## 2020-03-09 DIAGNOSIS — E04.2 MULTINODULAR GOITER: Primary | ICD-10-CM

## 2020-03-09 DIAGNOSIS — Z01.818 PREOPERATIVE TESTING: ICD-10-CM

## 2020-03-09 LAB — CALCIUM SERPL-MCNC: 9 MG/DL (ref 8.7–10.5)

## 2020-03-09 PROCEDURE — 27201107 HC STYLET, STANDARD: Performed by: ANESTHESIOLOGY

## 2020-03-09 PROCEDURE — 37000008 HC ANESTHESIA 1ST 15 MINUTES: Performed by: SURGERY

## 2020-03-09 PROCEDURE — 27202107 HC XP QUATRO SENSOR: Performed by: ANESTHESIOLOGY

## 2020-03-09 PROCEDURE — 25000003 PHARM REV CODE 250: Performed by: SURGERY

## 2020-03-09 PROCEDURE — S0028 INJECTION, FAMOTIDINE, 20 MG: HCPCS | Performed by: NURSE ANESTHETIST, CERTIFIED REGISTERED

## 2020-03-09 PROCEDURE — 71000039 HC RECOVERY, EACH ADD'L HOUR: Performed by: SURGERY

## 2020-03-09 PROCEDURE — 27000673 HC TUBING BLOOD Y: Performed by: ANESTHESIOLOGY

## 2020-03-09 PROCEDURE — 36000706: Performed by: SURGERY

## 2020-03-09 PROCEDURE — 27000656 HC EYE GOGGLES: Performed by: ANESTHESIOLOGY

## 2020-03-09 PROCEDURE — 63600175 PHARM REV CODE 636 W HCPCS: Performed by: ANESTHESIOLOGY

## 2020-03-09 PROCEDURE — 71000033 HC RECOVERY, INTIAL HOUR: Performed by: SURGERY

## 2020-03-09 PROCEDURE — 88307 TISSUE EXAM BY PATHOLOGIST: CPT | Mod: TC,59

## 2020-03-09 PROCEDURE — 27000221 HC OXYGEN, UP TO 24 HOURS

## 2020-03-09 PROCEDURE — 36415 COLL VENOUS BLD VENIPUNCTURE: CPT

## 2020-03-09 PROCEDURE — 63600175 PHARM REV CODE 636 W HCPCS: Performed by: NURSE ANESTHETIST, CERTIFIED REGISTERED

## 2020-03-09 PROCEDURE — 36000707: Performed by: SURGERY

## 2020-03-09 PROCEDURE — 27201423 OPTIME MED/SURG SUP & DEVICES STERILE SUPPLY: Performed by: SURGERY

## 2020-03-09 PROCEDURE — 25000003 PHARM REV CODE 250: Performed by: ANESTHESIOLOGY

## 2020-03-09 PROCEDURE — 82310 ASSAY OF CALCIUM: CPT

## 2020-03-09 PROCEDURE — 94761 N-INVAS EAR/PLS OXIMETRY MLT: CPT

## 2020-03-09 PROCEDURE — 27000671 HC TUBING MICROBORE EXT: Performed by: ANESTHESIOLOGY

## 2020-03-09 PROCEDURE — S5010 5% DEXTROSE AND 0.45% SALINE: HCPCS | Performed by: SURGERY

## 2020-03-09 PROCEDURE — 27100025 HC TUBING, SET FLUID WARMER: Performed by: ANESTHESIOLOGY

## 2020-03-09 PROCEDURE — 99900035 HC TECH TIME PER 15 MIN (STAT)

## 2020-03-09 PROCEDURE — 37000009 HC ANESTHESIA EA ADD 15 MINS: Performed by: SURGERY

## 2020-03-09 PROCEDURE — 94799 UNLISTED PULMONARY SVC/PX: CPT

## 2020-03-09 PROCEDURE — 25000003 PHARM REV CODE 250: Performed by: NURSE ANESTHETIST, CERTIFIED REGISTERED

## 2020-03-09 RX ORDER — ACETAMINOPHEN 500 MG
1000 TABLET ORAL ONCE
Status: COMPLETED | OUTPATIENT
Start: 2020-03-09 | End: 2020-03-09

## 2020-03-09 RX ORDER — LIDOCAINE HYDROCHLORIDE 20 MG/ML
INJECTION, SOLUTION EPIDURAL; INFILTRATION; INTRACAUDAL; PERINEURAL
Status: DISCONTINUED | OUTPATIENT
Start: 2020-03-09 | End: 2020-03-09

## 2020-03-09 RX ORDER — NEOSTIGMINE METHYLSULFATE 1 MG/ML
INJECTION, SOLUTION INTRAVENOUS
Status: DISCONTINUED | OUTPATIENT
Start: 2020-03-09 | End: 2020-03-09

## 2020-03-09 RX ORDER — DEXAMETHASONE SODIUM PHOSPHATE 4 MG/ML
INJECTION, SOLUTION INTRA-ARTICULAR; INTRALESIONAL; INTRAMUSCULAR; INTRAVENOUS; SOFT TISSUE
Status: DISCONTINUED | OUTPATIENT
Start: 2020-03-09 | End: 2020-03-09

## 2020-03-09 RX ORDER — SUCCINYLCHOLINE CHLORIDE 20 MG/ML
INJECTION INTRAMUSCULAR; INTRAVENOUS
Status: DISCONTINUED | OUTPATIENT
Start: 2020-03-09 | End: 2020-03-09

## 2020-03-09 RX ORDER — MIDAZOLAM HYDROCHLORIDE 1 MG/ML
INJECTION INTRAMUSCULAR; INTRAVENOUS
Status: DISCONTINUED | OUTPATIENT
Start: 2020-03-09 | End: 2020-03-09

## 2020-03-09 RX ORDER — HYDROCODONE BITARTRATE AND ACETAMINOPHEN 5; 325 MG/1; MG/1
1 TABLET ORAL EVERY 6 HOURS PRN
Status: DISCONTINUED | OUTPATIENT
Start: 2020-03-09 | End: 2020-03-10 | Stop reason: HOSPADM

## 2020-03-09 RX ORDER — DEXTROSE MONOHYDRATE AND SODIUM CHLORIDE 5; .45 G/100ML; G/100ML
INJECTION, SOLUTION INTRAVENOUS CONTINUOUS
Status: DISCONTINUED | OUTPATIENT
Start: 2020-03-09 | End: 2020-03-10 | Stop reason: HOSPADM

## 2020-03-09 RX ORDER — PANTOPRAZOLE SODIUM 40 MG/1
40 TABLET, DELAYED RELEASE ORAL
Status: DISCONTINUED | OUTPATIENT
Start: 2020-03-10 | End: 2020-03-10 | Stop reason: HOSPADM

## 2020-03-09 RX ORDER — HYDROMORPHONE HYDROCHLORIDE 1 MG/ML
0.2 INJECTION, SOLUTION INTRAMUSCULAR; INTRAVENOUS; SUBCUTANEOUS
Status: DISCONTINUED | OUTPATIENT
Start: 2020-03-09 | End: 2020-03-09 | Stop reason: HOSPADM

## 2020-03-09 RX ORDER — ONDANSETRON 2 MG/ML
4 INJECTION INTRAMUSCULAR; INTRAVENOUS DAILY PRN
Status: DISCONTINUED | OUTPATIENT
Start: 2020-03-09 | End: 2020-03-09 | Stop reason: HOSPADM

## 2020-03-09 RX ORDER — SODIUM CHLORIDE 0.9 % (FLUSH) 0.9 %
10 SYRINGE (ML) INJECTION
Status: DISCONTINUED | OUTPATIENT
Start: 2020-03-09 | End: 2020-03-10 | Stop reason: HOSPADM

## 2020-03-09 RX ORDER — FENTANYL CITRATE 50 UG/ML
INJECTION, SOLUTION INTRAMUSCULAR; INTRAVENOUS
Status: DISCONTINUED | OUTPATIENT
Start: 2020-03-09 | End: 2020-03-09

## 2020-03-09 RX ORDER — CLONAZEPAM 0.5 MG/1
0.5 TABLET ORAL 3 TIMES DAILY
Status: DISCONTINUED | OUTPATIENT
Start: 2020-03-09 | End: 2020-03-10 | Stop reason: HOSPADM

## 2020-03-09 RX ORDER — GLYCOPYRROLATE 0.2 MG/ML
INJECTION INTRAMUSCULAR; INTRAVENOUS
Status: DISCONTINUED | OUTPATIENT
Start: 2020-03-09 | End: 2020-03-09

## 2020-03-09 RX ORDER — ONDANSETRON 2 MG/ML
INJECTION INTRAMUSCULAR; INTRAVENOUS
Status: DISCONTINUED | OUTPATIENT
Start: 2020-03-09 | End: 2020-03-09

## 2020-03-09 RX ORDER — PROPOFOL 10 MG/ML
VIAL (ML) INTRAVENOUS
Status: DISCONTINUED | OUTPATIENT
Start: 2020-03-09 | End: 2020-03-09

## 2020-03-09 RX ORDER — FAMOTIDINE 10 MG/ML
INJECTION INTRAVENOUS
Status: DISCONTINUED | OUTPATIENT
Start: 2020-03-09 | End: 2020-03-09

## 2020-03-09 RX ORDER — ONDANSETRON 2 MG/ML
4 INJECTION INTRAMUSCULAR; INTRAVENOUS EVERY 12 HOURS PRN
Status: DISCONTINUED | OUTPATIENT
Start: 2020-03-09 | End: 2020-03-10 | Stop reason: HOSPADM

## 2020-03-09 RX ORDER — ACETAMINOPHEN 325 MG/1
650 TABLET ORAL EVERY 4 HOURS PRN
Status: DISCONTINUED | OUTPATIENT
Start: 2020-03-09 | End: 2020-03-10 | Stop reason: HOSPADM

## 2020-03-09 RX ORDER — OXYCODONE HYDROCHLORIDE 5 MG/1
5 TABLET ORAL
Status: DISCONTINUED | OUTPATIENT
Start: 2020-03-09 | End: 2020-03-09 | Stop reason: HOSPADM

## 2020-03-09 RX ORDER — HYDRALAZINE HYDROCHLORIDE 20 MG/ML
INJECTION INTRAMUSCULAR; INTRAVENOUS
Status: DISCONTINUED | OUTPATIENT
Start: 2020-03-09 | End: 2020-03-09

## 2020-03-09 RX ORDER — FAMOTIDINE 20 MG/1
20 TABLET, FILM COATED ORAL 2 TIMES DAILY
Status: DISCONTINUED | OUTPATIENT
Start: 2020-03-09 | End: 2020-03-10 | Stop reason: HOSPADM

## 2020-03-09 RX ORDER — ROCURONIUM BROMIDE 10 MG/ML
INJECTION, SOLUTION INTRAVENOUS
Status: DISCONTINUED | OUTPATIENT
Start: 2020-03-09 | End: 2020-03-09

## 2020-03-09 RX ORDER — DIPHENHYDRAMINE HYDROCHLORIDE 50 MG/ML
12.5 INJECTION INTRAMUSCULAR; INTRAVENOUS
Status: DISCONTINUED | OUTPATIENT
Start: 2020-03-09 | End: 2020-03-09 | Stop reason: HOSPADM

## 2020-03-09 RX ORDER — CEFAZOLIN SODIUM 1 G/3ML
INJECTION, POWDER, FOR SOLUTION INTRAMUSCULAR; INTRAVENOUS
Status: DISCONTINUED | OUTPATIENT
Start: 2020-03-09 | End: 2020-03-09

## 2020-03-09 RX ORDER — SODIUM CHLORIDE, SODIUM LACTATE, POTASSIUM CHLORIDE, CALCIUM CHLORIDE 600; 310; 30; 20 MG/100ML; MG/100ML; MG/100ML; MG/100ML
INJECTION, SOLUTION INTRAVENOUS CONTINUOUS PRN
Status: DISCONTINUED | OUTPATIENT
Start: 2020-03-09 | End: 2020-03-09

## 2020-03-09 RX ORDER — BUPIVACAINE HYDROCHLORIDE AND EPINEPHRINE 5; 5 MG/ML; UG/ML
INJECTION, SOLUTION EPIDURAL; INTRACAUDAL; PERINEURAL
Status: DISCONTINUED | OUTPATIENT
Start: 2020-03-09 | End: 2020-03-09 | Stop reason: HOSPADM

## 2020-03-09 RX ADMIN — ONDANSETRON 4 MG: 2 INJECTION INTRAMUSCULAR; INTRAVENOUS at 09:03

## 2020-03-09 RX ADMIN — FAMOTIDINE 20 MG: 10 INJECTION, SOLUTION INTRAVENOUS at 09:03

## 2020-03-09 RX ADMIN — ROCURONIUM BROMIDE 45 MG: 10 INJECTION, SOLUTION INTRAVENOUS at 09:03

## 2020-03-09 RX ADMIN — SODIUM CHLORIDE, SODIUM LACTATE, POTASSIUM CHLORIDE, AND CALCIUM CHLORIDE: .6; .31; .03; .02 INJECTION, SOLUTION INTRAVENOUS at 08:03

## 2020-03-09 RX ADMIN — GLYCOPYRROLATE 0.8 MG: 0.2 INJECTION INTRAMUSCULAR; INTRAVENOUS at 11:03

## 2020-03-09 RX ADMIN — HYDROMORPHONE HYDROCHLORIDE 0.2 MG: 1 INJECTION, SOLUTION INTRAMUSCULAR; INTRAVENOUS; SUBCUTANEOUS at 12:03

## 2020-03-09 RX ADMIN — MIDAZOLAM HYDROCHLORIDE 2 MG: 1 INJECTION, SOLUTION INTRAMUSCULAR; INTRAVENOUS at 09:03

## 2020-03-09 RX ADMIN — SODIUM CHLORIDE, SODIUM LACTATE, POTASSIUM CHLORIDE, AND CALCIUM CHLORIDE: .6; .31; .03; .02 INJECTION, SOLUTION INTRAVENOUS at 12:03

## 2020-03-09 RX ADMIN — FENTANYL CITRATE 50 MCG: 50 INJECTION INTRAMUSCULAR; INTRAVENOUS at 09:03

## 2020-03-09 RX ADMIN — FAMOTIDINE 20 MG: 20 TABLET, FILM COATED ORAL at 10:03

## 2020-03-09 RX ADMIN — ACETAMINOPHEN 650 MG: 325 TABLET ORAL at 03:03

## 2020-03-09 RX ADMIN — PROPOFOL 120 MG: 10 INJECTION, EMULSION INTRAVENOUS at 09:03

## 2020-03-09 RX ADMIN — ROCURONIUM BROMIDE 5 MG: 10 INJECTION, SOLUTION INTRAVENOUS at 09:03

## 2020-03-09 RX ADMIN — HYDRALAZINE HYDROCHLORIDE 5 MG: 20 INJECTION INTRAMUSCULAR; INTRAVENOUS at 11:03

## 2020-03-09 RX ADMIN — ACETAMINOPHEN 650 MG: 325 TABLET ORAL at 07:03

## 2020-03-09 RX ADMIN — DEXTROSE AND SODIUM CHLORIDE: 5; .45 INJECTION, SOLUTION INTRAVENOUS at 01:03

## 2020-03-09 RX ADMIN — ACETAMINOPHEN 1000 MG: 500 TABLET, FILM COATED ORAL at 08:03

## 2020-03-09 RX ADMIN — CEFAZOLIN 2 G: 330 INJECTION, POWDER, FOR SOLUTION INTRAMUSCULAR; INTRAVENOUS at 09:03

## 2020-03-09 RX ADMIN — NEOSTIGMINE METHYLSULFATE 5 MG: 1 INJECTION INTRAVENOUS at 11:03

## 2020-03-09 RX ADMIN — ACETAMINOPHEN 650 MG: 325 TABLET ORAL at 11:03

## 2020-03-09 RX ADMIN — SODIUM CHLORIDE, SODIUM LACTATE, POTASSIUM CHLORIDE, AND CALCIUM CHLORIDE: .6; .31; .03; .02 INJECTION, SOLUTION INTRAVENOUS at 09:03

## 2020-03-09 RX ADMIN — LIDOCAINE HYDROCHLORIDE 80 MG: 20 INJECTION, SOLUTION EPIDURAL; INFILTRATION; INTRACAUDAL; PERINEURAL at 09:03

## 2020-03-09 RX ADMIN — SUCCINYLCHOLINE CHLORIDE 140 MG: 20 INJECTION, SOLUTION INTRAMUSCULAR; INTRAVENOUS at 09:03

## 2020-03-09 RX ADMIN — CLONAZEPAM 0.5 MG: 0.5 TABLET ORAL at 10:03

## 2020-03-09 RX ADMIN — CLONAZEPAM 0.5 MG: 0.5 TABLET ORAL at 03:03

## 2020-03-09 RX ADMIN — FENTANYL CITRATE 50 MCG: 50 INJECTION INTRAMUSCULAR; INTRAVENOUS at 10:03

## 2020-03-09 RX ADMIN — DEXAMETHASONE SODIUM PHOSPHATE 8 MG: 4 INJECTION, SOLUTION INTRAMUSCULAR; INTRAVENOUS at 09:03

## 2020-03-09 NOTE — PLAN OF CARE
Met with patient stated that she has a PCP- Dr. Byrne, lives at home with her , No Home Health has a walker that she got after hip replacement in 2017 , has transportation and can afford prescriptions.       03/09/20 1507   Discharge Assessment   Assessment Type Discharge Planning Assessment   Confirmed/corrected address and phone number on facesheet? Yes   Assessment information obtained from? Patient   Prior to hospitilization cognitive status: Alert/Oriented   Prior to hospitalization functional status: Independent   Current cognitive status: Alert/Oriented   Current Functional Status: Independent   Lives With spouse   Able to Return to Prior Arrangements yes   Is patient able to care for self after discharge? Yes   Equipment Currently Used at Home walker, standard   Do you have any problems affording any of your prescribed medications? No   Is the patient taking medications as prescribed? yes   Does the patient have transportation home? Yes   Transportation Anticipated family or friend will provide   Does the patient receive services at the Coumadin Clinic? No   Discharge Plan A Home   Discharge Plan B Home   DME Needed Upon Discharge  none   Patient/Family in Agreement with Plan yes

## 2020-03-09 NOTE — ANESTHESIA POSTPROCEDURE EVALUATION
Anesthesia Post Evaluation    Patient: Gloria Manuel    Procedure(s) Performed: Procedure(s) (LRB):  TOTAL THYROIDECTOMY (N/A)    Final Anesthesia Type: general    Patient location during evaluation: PACU  Patient participation: Yes- Able to Participate  Level of consciousness: awake and alert  Post-procedure vital signs: reviewed and stable  Pain management: adequate  Airway patency: patent    PONV status at discharge: No PONV  Anesthetic complications: no      Cardiovascular status: stable  Respiratory status: unassisted and spontaneous ventilation  Hydration status: euvolemic  Follow-up not needed.    No dyspnea, stridor, dysphonia      Vitals Value Taken Time   /59 3/9/2020  1:15 PM   Temp 36.8 °C (98.3 °F) 3/9/2020  1:00 PM   Pulse 65 3/9/2020  1:24 PM   Resp 10 3/9/2020  1:22 PM   SpO2 95 % 3/9/2020  1:22 PM   Vitals shown include unvalidated device data.      No case tracking events are documented in the log.      Pain/Ray Score: Pain Rating Prior to Med Admin: 4 (3/9/2020 12:40 PM)  Ray Score: 8 (3/9/2020  1:00 PM)

## 2020-03-09 NOTE — PLAN OF CARE
03/09/20 1448   Patient Assessment/Suction   Level of Consciousness (AVPU) alert   Respiratory Effort Normal;Unlabored   Expansion/Accessory Muscles/Retractions expansion symmetric;no use of accessory muscles   All Lung Fields Breath Sounds clear;equal bilaterally   Rhythm/Pattern, Respiratory pattern regular   PRE-TX-O2   O2 Device (Oxygen Therapy) nasal cannula   $ Is the patient on Low Flow Oxygen? Yes   Flow (L/min) 2   SpO2 95 %   Pulse Oximetry Type Intermittent   $ Pulse Oximetry - Multiple Charge Pulse Oximetry - Multiple   Pulse 63   Resp 16   Positioning HOB elevated 45 degrees   Incentive Spirometer   $ Incentive Spirometer Charges done with encouragement   Administration (IS) instruction provided, initial;mouthpiece;proper technique demonstrated   Number of Repetitions (IS) 10   Level Incentive Spirometer (mL) 2000   Patient Tolerance (IS) good;no adverse signs/symptoms present   Respiratory Evaluation   $ Care Plan Tech Time 15 min   Evaluation For New Orders

## 2020-03-09 NOTE — PLAN OF CARE
"Arrived to PACU s/p total thyroidectomy.  Arouses to verbals.  Denies pain.  Vocalizes the "e" sound.  Dressing to anterior neck with exofin, 4x4 and tegaderm that is D&I.  Ice pack applied  "

## 2020-03-09 NOTE — ANESTHESIA PROCEDURE NOTES
Intubation  Performed by: Cain Radford CRNA  Authorized by: Stu Conrad MD     Intubation:     Induction:  Intravenous    Intubated:  Postinduction    Mask Ventilation:  Easy mask    Attempts:  1    Attempted By:  CRNA    Method of Intubation:  Direct    Blade:  Pierre 2    Laryngeal View Grade: Grade I - full view of chords      Difficult Airway Encountered?: No      Complications:  None    Airway Device:  Oral endotracheal tube    Airway Device Size:  7.0    Style/Cuff Inflation:  Cuffed    Tube secured:  21    Secured at:  The lips    Placement Verified By:  Capnometry    Complicating Factors:  None    Findings Post-Intubation:  BS equal bilateral and atraumatic/condition of teeth unchanged

## 2020-03-09 NOTE — ANESTHESIA PREPROCEDURE EVALUATION
03/09/2020  Gloria Manuel is a 64 y.o., female.    Anesthesia Evaluation    I have reviewed the Patient Summary Reports.    I have reviewed the Nursing Notes.   I have reviewed the Medications.     Review of Systems  Anesthesia Hx:  Denies Family Hx of Anesthesia complications.   Denies Personal Hx of Anesthesia complications.   Social:  Former Smoker    EENT/Dental:   Upper full and lower partial dentures. Lower crowns. Large goiter.  No hoarseness or dysphagia.   Cardiovascular:   Valvular problems/Murmurs (asymptomatic), MVP    Hepatic/GI:   GERD, well controlled    Musculoskeletal:   Arthritis  occ lower back pain.  Old stimulator has not worked for 15 years.   Neurological:   Headaches   Peripheral Neuropathy (occ LLE numbness)    Psych:   anxiety depression          Physical Exam  General:  Well nourished    Airway/Jaw/Neck:  Airway Findings: Mouth Opening: Normal Tongue: Normal  Mallampati: III  Improves to II with phonation.  TM Distance: Normal, at least 6 cm  Jaw/Neck Findings:  Neck ROM: Normal ROM   Large goiter below larynx, which is midline and mobile    Dental:  Dental Findings: Upper Dentures, Lower partial dentures   Chest/Lungs:  Chest/Lungs Findings: Clear to auscultation, Normal Respiratory Rate     Heart/Vascular:  Heart Findings: Rate: Normal  Rhythm: Regular Rhythm  Sounds: Normal  Heart murmur: negative       Mental Status:  Mental Status Findings:  Cooperative, Alert and Oriented         Anesthesia Plan  Type of Anesthesia, risks & benefits discussed:  Anesthesia Type:  general  Patient's Preference:   Intra-op Monitoring Plan: standard ASA monitors  Intra-op Monitoring Plan Comments:   Post Op Pain Control Plan: multimodal analgesia and IV/PO Opioids PRN  Post Op Pain Control Plan Comments:   Induction:   IV  Beta Blocker:  Patient is not currently on a Beta-Blocker (No  further documentation required).       Informed Consent: Patient understands risks and agrees with Anesthesia plan.  Questions answered. Anesthesia consent signed with patient.  ASA Score: 2     Day of Surgery Review of History & Physical:        Anesthesia Plan Notes: GETA  Sleep apnea risk low  Multimodal analgesia: Tylenol, Decadron 8mg  Antiemetics: Zofran, Pepcid          Ready For Surgery From Anesthesia Perspective.

## 2020-03-09 NOTE — PLAN OF CARE
Patient transported to Room 2118 via stretcher in stable condition.  Pain 3/10 and tolerable. Dressing to anterior neck D&I, ice pack in use.   Transferred to bed without incident.  HOB elevated 60 degrees.  Placed on bedpan and voided 200ml.  Family at bedside.  Report given and care relinquished to ELGIN Desai

## 2020-03-10 VITALS
OXYGEN SATURATION: 95 % | HEART RATE: 69 BPM | SYSTOLIC BLOOD PRESSURE: 107 MMHG | HEIGHT: 68 IN | BODY MASS INDEX: 31.57 KG/M2 | DIASTOLIC BLOOD PRESSURE: 72 MMHG | TEMPERATURE: 98 F | RESPIRATION RATE: 16 BRPM | WEIGHT: 208.31 LBS

## 2020-03-10 LAB
ANION GAP SERPL CALC-SCNC: 7 MMOL/L (ref 8–16)
BUN SERPL-MCNC: 9 MG/DL (ref 8–23)
CALCIUM SERPL-MCNC: 9.5 MG/DL (ref 8.7–10.5)
CHLORIDE SERPL-SCNC: 107 MMOL/L (ref 95–110)
CO2 SERPL-SCNC: 26 MMOL/L (ref 23–29)
CREAT SERPL-MCNC: 0.7 MG/DL (ref 0.5–1.4)
EST. GFR  (AFRICAN AMERICAN): >60 ML/MIN/1.73 M^2
EST. GFR  (NON AFRICAN AMERICAN): >60 ML/MIN/1.73 M^2
GLUCOSE SERPL-MCNC: 133 MG/DL (ref 70–110)
POTASSIUM SERPL-SCNC: 3.6 MMOL/L (ref 3.5–5.1)
SODIUM SERPL-SCNC: 140 MMOL/L (ref 136–145)

## 2020-03-10 PROCEDURE — 36415 COLL VENOUS BLD VENIPUNCTURE: CPT

## 2020-03-10 PROCEDURE — 25000003 PHARM REV CODE 250: Performed by: SURGERY

## 2020-03-10 PROCEDURE — 80048 BASIC METABOLIC PNL TOTAL CA: CPT

## 2020-03-10 RX ADMIN — CLONAZEPAM 0.5 MG: 0.5 TABLET ORAL at 09:03

## 2020-03-10 RX ADMIN — PANTOPRAZOLE SODIUM 40 MG: 40 TABLET, DELAYED RELEASE ORAL at 05:03

## 2020-03-10 RX ADMIN — FAMOTIDINE 20 MG: 20 TABLET, FILM COATED ORAL at 09:03

## 2020-03-10 NOTE — CARE UPDATE
03/09/20 2020   Patient Assessment/Suction   Level of Consciousness (AVPU) alert   Respiratory Effort Unlabored   Expansion/Accessory Muscles/Retractions no retractions   All Lung Fields Breath Sounds clear;equal bilaterally   Rhythm/Pattern, Respiratory no shortness of breath reported   Cough Frequency no cough   PRE-TX-O2   O2 Device (Oxygen Therapy) room air   SpO2 96 %   Pulse Oximetry Type Intermittent   Pulse 74   Resp 15   Positioning   Head of Bed (HOB) HOB elevated   Incentive Spirometer   $ Incentive Spirometer Charges done with encouragement   Administration (IS) instruction provided, follow-up   Number of Repetitions (IS) 10   Level Incentive Spirometer (mL) 2000   Patient Tolerance (IS) good   Respiratory Evaluation   $ Care Plan Tech Time 15 min

## 2020-03-10 NOTE — OP NOTE
Preop diagnosis:  Enlarging multinodular goiter  Postop diagnosis:  Same  Procedure:  Thyroidectomy  Patient was placed supine on operating table where satisfactory general anesthesia.  Both arms were tucked transverse roll was placed.  The neck was extended.  The neck and upper chest were prepped and draped sterile fashion.  I made a generous transverse collar incision 2 fingerbreadths above the sternal notch through a natural skin line.  Flaps were developed superiorly and inferiorly an appropriate plane.  Strap muscles divided in the midline exposing the thyroid gland.  The isthmus was divided with the LigaSure.  I took out the left lobe 1st.  She had a very large dominant nodule 4-5 cm in size in the left lobe.  This superior suspensory ligament was taken down with the LigaSure.  Branches of the superior thyroid artery and vein were divided on the surface of the gland using the ligature or with silk ties.  After full mobilization of the superior pole middle thyroid vein was divided with the LigaSure.  I then rotated the gland off the trachea with electrocautery.  Inferior dissection was carried out dividing the branches of the inferior thyroid artery and vein on the surface of the gland.  Inferior parathyroid gland was identified and preserved with its blood supply.  Posterior lateral dissection was done after I was able to mobilize the nodule out of the neck incision.  The recurrent laryngeal nerve was in its normal position free from the area of dissection.  I did continue my dissection down to the ligament of Hightower attachments which were divided with the ligature.  Specimen was examined there was no evidence any parathyroid tissue visibly on the surface of the gland.  I did not was certainly see the left superior parathyroid gland.  With evidence of satisfactory hemostasis at term attention to the right side.  The gland on the right side was nodular, much smaller in size.  The same dissection was carried out.   Superior parathyroid gland was identified and preserved this blood supply as was the inferior parathyroid gland recurrent laryngeal nerve was identified free from the area of dissection.  Ligament of Hightower taps meds were divided with the ligature or electrocautery.  I had divided the strap muscles on the left side for exposure.  These were reapproximated with mattress sutures of 2 0 Vicryl.  They were not divided on the right side.  The strap muscles were reapproximated in midline with running 3 0 chromic.  The the 4 0 Vicryl was used to close with asthma with interrupted sutures and the skin was closed subcuticular for a strata Fix.  Incisions were infiltrated local anesthetic.  Patient tolerated procedure well indication

## 2020-03-10 NOTE — DISCHARGE SUMMARY
Final diagnosis:  Enlarging multinodular goiter  Operation thyroidectomy  Hospital course:  Patient admitted underwent a thyroidectomy.  She is sent home the morning after surgery.  Patient is ambulating well, tolerating a diet with no evidence of any bleeding.  Her voice is normal.  Serum calcium levels postoperatively have been normal.  She is sent home on Synthroid 100 micro g per day with instructions regarding proper wound care diet and activity / follow up in the office in 1 week

## 2021-05-17 ENCOUNTER — OFFICE VISIT (OUTPATIENT)
Dept: ORTHOPEDICS | Facility: CLINIC | Age: 66
End: 2021-05-17
Payer: MEDICARE

## 2021-05-17 VITALS
WEIGHT: 200 LBS | BODY MASS INDEX: 30.31 KG/M2 | HEIGHT: 68 IN | SYSTOLIC BLOOD PRESSURE: 130 MMHG | DIASTOLIC BLOOD PRESSURE: 64 MMHG | HEART RATE: 76 BPM

## 2021-05-17 DIAGNOSIS — S80.01XA CONTUSION OF RIGHT KNEE, INITIAL ENCOUNTER: ICD-10-CM

## 2021-05-17 DIAGNOSIS — M70.61 GREATER TROCHANTERIC BURSITIS, RIGHT: Primary | ICD-10-CM

## 2021-05-17 DIAGNOSIS — Z96.641 HISTORY OF TOTAL HIP ARTHROPLASTY, RIGHT: ICD-10-CM

## 2021-05-17 DIAGNOSIS — M25.561 ACUTE PAIN OF RIGHT KNEE: ICD-10-CM

## 2021-05-17 PROCEDURE — 99213 OFFICE O/P EST LOW 20 MIN: CPT | Mod: 25,S$GLB,, | Performed by: PHYSICIAN ASSISTANT

## 2021-05-17 PROCEDURE — 20610 DRAIN/INJ JOINT/BURSA W/O US: CPT | Mod: RT,S$GLB,, | Performed by: PHYSICIAN ASSISTANT

## 2021-05-17 PROCEDURE — 99213 PR OFFICE/OUTPT VISIT, EST, LEVL III, 20-29 MIN: ICD-10-PCS | Mod: 25,S$GLB,, | Performed by: PHYSICIAN ASSISTANT

## 2021-05-17 PROCEDURE — 20610 LARGE JOINT ASPIRATION/INJECTION: R GREATER TROCHANTERIC BURSA: ICD-10-PCS | Mod: RT,S$GLB,, | Performed by: PHYSICIAN ASSISTANT

## 2021-05-17 RX ORDER — SERTRALINE HYDROCHLORIDE 100 MG/1
TABLET, FILM COATED ORAL
COMMUNITY
End: 2021-05-21 | Stop reason: SDUPTHER

## 2021-05-17 RX ORDER — METHYLPREDNISOLONE ACETATE 40 MG/ML
40 INJECTION, SUSPENSION INTRA-ARTICULAR; INTRALESIONAL; INTRAMUSCULAR; SOFT TISSUE
Status: DISCONTINUED | OUTPATIENT
Start: 2021-05-17 | End: 2021-05-17 | Stop reason: HOSPADM

## 2021-05-17 RX ORDER — LEVOTHYROXINE SODIUM 125 UG/1
112 TABLET ORAL DAILY
COMMUNITY
Start: 2021-04-18

## 2021-05-17 RX ORDER — ACYCLOVIR 400 MG/1
TABLET ORAL
COMMUNITY
End: 2023-08-04 | Stop reason: SDUPTHER

## 2021-05-17 RX ADMIN — METHYLPREDNISOLONE ACETATE 40 MG: 40 INJECTION, SUSPENSION INTRA-ARTICULAR; INTRALESIONAL; INTRAMUSCULAR; SOFT TISSUE at 10:05

## 2021-05-21 RX ORDER — SERTRALINE HYDROCHLORIDE 100 MG/1
100 TABLET, FILM COATED ORAL DAILY
Qty: 30 TABLET | Refills: 0 | Status: SHIPPED | OUTPATIENT
Start: 2021-05-21 | End: 2021-06-24

## 2021-05-25 ENCOUNTER — OFFICE VISIT (OUTPATIENT)
Dept: FAMILY MEDICINE | Facility: CLINIC | Age: 66
End: 2021-05-25
Payer: MEDICARE

## 2021-05-25 VITALS
TEMPERATURE: 98 F | OXYGEN SATURATION: 97 % | DIASTOLIC BLOOD PRESSURE: 66 MMHG | HEART RATE: 66 BPM | HEIGHT: 66 IN | SYSTOLIC BLOOD PRESSURE: 128 MMHG | BODY MASS INDEX: 31.98 KG/M2 | WEIGHT: 199 LBS

## 2021-05-25 DIAGNOSIS — F33.1 MODERATE EPISODE OF RECURRENT MAJOR DEPRESSIVE DISORDER: ICD-10-CM

## 2021-05-25 DIAGNOSIS — E89.0 POSTOPERATIVE HYPOTHYROIDISM: ICD-10-CM

## 2021-05-25 DIAGNOSIS — M81.0 AGE-RELATED OSTEOPOROSIS WITHOUT CURRENT PATHOLOGICAL FRACTURE: Primary | ICD-10-CM

## 2021-05-25 DIAGNOSIS — E78.2 MIXED HYPERLIPIDEMIA: ICD-10-CM

## 2021-05-25 DIAGNOSIS — B00.1 HERPES LABIALIS: ICD-10-CM

## 2021-05-25 DIAGNOSIS — G25.81 RLS (RESTLESS LEGS SYNDROME): ICD-10-CM

## 2021-05-25 DIAGNOSIS — C73 THYROID CANCER: ICD-10-CM

## 2021-05-25 DIAGNOSIS — Z12.31 ENCOUNTER FOR SCREENING MAMMOGRAM FOR BREAST CANCER: ICD-10-CM

## 2021-05-25 DIAGNOSIS — K21.00 GASTROESOPHAGEAL REFLUX DISEASE WITH ESOPHAGITIS WITHOUT HEMORRHAGE: ICD-10-CM

## 2021-05-25 DIAGNOSIS — M16.11 PRIMARY OSTEOARTHRITIS OF RIGHT HIP: ICD-10-CM

## 2021-05-25 DIAGNOSIS — R73.9 HYPERGLYCEMIA: ICD-10-CM

## 2021-05-25 PROBLEM — R92.8 ABNORMAL FINDING ON MAMMOGRAPHY: Status: RESOLVED | Noted: 2021-05-25 | Resolved: 2021-05-25

## 2021-05-25 PROBLEM — M25.859 FEMORAL ACETABULAR IMPINGEMENT: Status: ACTIVE | Noted: 2017-11-06

## 2021-05-25 PROBLEM — F33.8 SEASONAL AFFECTIVE DISORDER: Status: ACTIVE | Noted: 2021-05-25

## 2021-05-25 PROBLEM — Z96.641 STATUS POST TOTAL REPLACEMENT OF RIGHT HIP: Status: RESOLVED | Noted: 2018-06-25 | Resolved: 2021-05-25

## 2021-05-25 PROBLEM — Z01.818 PREOPERATIVE TESTING: Status: RESOLVED | Noted: 2020-03-09 | Resolved: 2021-05-25

## 2021-05-25 PROBLEM — R92.8 ABNORMAL FINDING ON MAMMOGRAPHY: Status: ACTIVE | Noted: 2021-05-25

## 2021-05-25 PROBLEM — M25.859 FEMORAL ACETABULAR IMPINGEMENT: Status: RESOLVED | Noted: 2017-11-06 | Resolved: 2021-05-25

## 2021-05-25 PROCEDURE — 99214 OFFICE O/P EST MOD 30 MIN: CPT | Mod: S$GLB,,, | Performed by: STUDENT IN AN ORGANIZED HEALTH CARE EDUCATION/TRAINING PROGRAM

## 2021-05-25 PROCEDURE — 99214 PR OFFICE/OUTPT VISIT, EST, LEVL IV, 30-39 MIN: ICD-10-PCS | Mod: S$GLB,,, | Performed by: STUDENT IN AN ORGANIZED HEALTH CARE EDUCATION/TRAINING PROGRAM

## 2021-05-26 ENCOUNTER — LAB VISIT (OUTPATIENT)
Dept: LAB | Facility: CLINIC | Age: 66
End: 2021-05-26
Payer: MEDICARE

## 2021-05-26 DIAGNOSIS — R73.9 HYPERGLYCEMIA: ICD-10-CM

## 2021-05-26 DIAGNOSIS — Z12.11 COLON CANCER SCREENING: ICD-10-CM

## 2021-05-26 DIAGNOSIS — E78.2 MIXED HYPERLIPIDEMIA: ICD-10-CM

## 2021-05-26 LAB
CHOLEST SERPL-MCNC: 228 MG/DL (ref 120–199)
CHOLEST/HDLC SERPL: 4 {RATIO} (ref 2–5)
ESTIMATED AVG GLUCOSE: 123 MG/DL (ref 68–131)
HBA1C MFR BLD: 5.9 % (ref 4–5.6)
HDLC SERPL-MCNC: 57 MG/DL (ref 40–75)
HDLC SERPL: 25 % (ref 20–50)
LDLC SERPL CALC-MCNC: 150 MG/DL (ref 63–159)
NONHDLC SERPL-MCNC: 171 MG/DL
TRIGL SERPL-MCNC: 105 MG/DL (ref 30–150)

## 2021-05-26 PROCEDURE — 36415 PR COLLECTION VENOUS BLOOD,VENIPUNCTURE: ICD-10-PCS | Mod: ,,, | Performed by: STUDENT IN AN ORGANIZED HEALTH CARE EDUCATION/TRAINING PROGRAM

## 2021-05-26 PROCEDURE — 80061 LIPID PANEL: CPT | Performed by: STUDENT IN AN ORGANIZED HEALTH CARE EDUCATION/TRAINING PROGRAM

## 2021-05-26 PROCEDURE — 83036 HEMOGLOBIN GLYCOSYLATED A1C: CPT | Performed by: STUDENT IN AN ORGANIZED HEALTH CARE EDUCATION/TRAINING PROGRAM

## 2021-05-26 PROCEDURE — 36415 COLL VENOUS BLD VENIPUNCTURE: CPT | Mod: ,,, | Performed by: STUDENT IN AN ORGANIZED HEALTH CARE EDUCATION/TRAINING PROGRAM

## 2021-05-28 ENCOUNTER — HOSPITAL ENCOUNTER (OUTPATIENT)
Dept: RADIOLOGY | Facility: CLINIC | Age: 66
Discharge: HOME | End: 2021-05-28
Attending: STUDENT IN AN ORGANIZED HEALTH CARE EDUCATION/TRAINING PROGRAM
Payer: MEDICARE

## 2021-05-28 DIAGNOSIS — Z12.31 ENCOUNTER FOR SCREENING MAMMOGRAM FOR BREAST CANCER: ICD-10-CM

## 2021-05-28 PROCEDURE — 77067 SCR MAMMO BI INCL CAD: CPT | Mod: TC,PO

## 2021-05-28 PROCEDURE — 77067 MAMMO DIGITAL SCREENING BILAT WITH TOMO: ICD-10-PCS | Mod: 26,,, | Performed by: RADIOLOGY

## 2021-05-28 PROCEDURE — 77067 SCR MAMMO BI INCL CAD: CPT | Mod: 26,,, | Performed by: RADIOLOGY

## 2021-05-28 PROCEDURE — 77063 BREAST TOMOSYNTHESIS BI: CPT | Mod: 26,,, | Performed by: RADIOLOGY

## 2021-05-28 PROCEDURE — 77063 MAMMO DIGITAL SCREENING BILAT WITH TOMO: ICD-10-PCS | Mod: 26,,, | Performed by: RADIOLOGY

## 2021-06-24 DIAGNOSIS — F33.1 MODERATE EPISODE OF RECURRENT MAJOR DEPRESSIVE DISORDER: Primary | ICD-10-CM

## 2021-06-24 DIAGNOSIS — R92.8 ABNORMAL MAMMOGRAM: Primary | ICD-10-CM

## 2021-06-24 DIAGNOSIS — F33.1 MODERATE EPISODE OF RECURRENT MAJOR DEPRESSIVE DISORDER: ICD-10-CM

## 2021-06-24 RX ORDER — SERTRALINE HYDROCHLORIDE 100 MG/1
TABLET, FILM COATED ORAL
Qty: 30 TABLET | Refills: 5 | OUTPATIENT
Start: 2021-06-24

## 2021-06-24 RX ORDER — SERTRALINE HYDROCHLORIDE 100 MG/1
TABLET, FILM COATED ORAL
Qty: 30 TABLET | Refills: 5 | Status: SHIPPED | OUTPATIENT
Start: 2021-06-24 | End: 2022-01-28 | Stop reason: SDUPTHER

## 2021-07-06 ENCOUNTER — HOSPITAL ENCOUNTER (OUTPATIENT)
Dept: RADIOLOGY | Facility: HOSPITAL | Age: 66
Discharge: HOME | End: 2021-07-06
Attending: STUDENT IN AN ORGANIZED HEALTH CARE EDUCATION/TRAINING PROGRAM
Payer: MEDICARE

## 2021-07-06 DIAGNOSIS — R92.8 ABNORMAL MAMMOGRAM: ICD-10-CM

## 2021-07-06 PROCEDURE — 77062 BREAST TOMOSYNTHESIS BI: CPT | Mod: TC

## 2021-07-06 PROCEDURE — 77062 BREAST TOMOSYNTHESIS BI: CPT | Mod: 26,,, | Performed by: RADIOLOGY

## 2021-07-06 PROCEDURE — 77066 MAMMO DIGITAL DIAGNOSTIC BILAT WITH TOMO: ICD-10-PCS | Mod: 26,,, | Performed by: RADIOLOGY

## 2021-07-06 PROCEDURE — 76642 ULTRASOUND BREAST LIMITED: CPT | Mod: 26,50,, | Performed by: RADIOLOGY

## 2021-07-06 PROCEDURE — 76642 US BREAST BILATERAL LIMITED: ICD-10-PCS | Mod: 26,50,, | Performed by: RADIOLOGY

## 2021-07-06 PROCEDURE — 77066 DX MAMMO INCL CAD BI: CPT | Mod: 26,,, | Performed by: RADIOLOGY

## 2021-07-06 PROCEDURE — 77062 MAMMO DIGITAL DIAGNOSTIC BILAT WITH TOMO: ICD-10-PCS | Mod: 26,,, | Performed by: RADIOLOGY

## 2021-07-06 PROCEDURE — 76642 ULTRASOUND BREAST LIMITED: CPT | Mod: TC,50

## 2021-07-07 ENCOUNTER — PATIENT MESSAGE (OUTPATIENT)
Dept: ADMINISTRATIVE | Facility: HOSPITAL | Age: 66
End: 2021-07-07

## 2021-07-27 ENCOUNTER — TELEPHONE (OUTPATIENT)
Dept: FAMILY MEDICINE | Facility: CLINIC | Age: 66
End: 2021-07-27

## 2021-07-27 DIAGNOSIS — C73 THYROID CANCER: ICD-10-CM

## 2021-07-27 DIAGNOSIS — U07.1 COVID-19: Primary | ICD-10-CM

## 2021-07-27 DIAGNOSIS — E66.09 CLASS 1 OBESITY DUE TO EXCESS CALORIES WITHOUT SERIOUS COMORBIDITY WITH BODY MASS INDEX (BMI) OF 33.0 TO 33.9 IN ADULT: ICD-10-CM

## 2021-07-27 PROBLEM — E66.811 CLASS 1 OBESITY DUE TO EXCESS CALORIES WITHOUT SERIOUS COMORBIDITY WITH BODY MASS INDEX (BMI) OF 33.0 TO 33.9 IN ADULT: Status: ACTIVE | Noted: 2021-07-27

## 2021-07-28 ENCOUNTER — CLINICAL SUPPORT (OUTPATIENT)
Dept: FAMILY MEDICINE | Facility: CLINIC | Age: 66
End: 2021-07-28
Payer: MEDICARE

## 2021-07-28 DIAGNOSIS — U07.1 COVID-19: ICD-10-CM

## 2021-07-28 PROCEDURE — U0005 INFEC AGEN DETEC AMPLI PROBE: HCPCS | Performed by: STUDENT IN AN ORGANIZED HEALTH CARE EDUCATION/TRAINING PROGRAM

## 2021-07-28 PROCEDURE — U0003 INFECTIOUS AGENT DETECTION BY NUCLEIC ACID (DNA OR RNA); SEVERE ACUTE RESPIRATORY SYNDROME CORONAVIRUS 2 (SARS-COV-2) (CORONAVIRUS DISEASE [COVID-19]), AMPLIFIED PROBE TECHNIQUE, MAKING USE OF HIGH THROUGHPUT TECHNOLOGIES AS DESCRIBED BY CMS-2020-01-R: HCPCS | Performed by: STUDENT IN AN ORGANIZED HEALTH CARE EDUCATION/TRAINING PROGRAM

## 2021-07-29 LAB
SARS-COV-2 RNA RESP QL NAA+PROBE: DETECTED
SARS-COV-2- CYCLE NUMBER: 18.95

## 2021-07-30 ENCOUNTER — PATIENT MESSAGE (OUTPATIENT)
Dept: ADMINISTRATIVE | Facility: HOSPITAL | Age: 66
End: 2021-07-30

## 2021-07-30 DIAGNOSIS — U07.1 COVID-19 VIRUS DETECTED: ICD-10-CM

## 2021-08-02 ENCOUNTER — PATIENT OUTREACH (OUTPATIENT)
Dept: ADMINISTRATIVE | Facility: HOSPITAL | Age: 66
End: 2021-08-02

## 2021-08-04 ENCOUNTER — OFFICE VISIT (OUTPATIENT)
Dept: FAMILY MEDICINE | Facility: CLINIC | Age: 66
End: 2021-08-04
Payer: MEDICARE

## 2021-08-04 ENCOUNTER — NURSE TRIAGE (OUTPATIENT)
Dept: ADMINISTRATIVE | Facility: CLINIC | Age: 66
End: 2021-08-04

## 2021-08-04 DIAGNOSIS — U07.1 COVID-19: Primary | ICD-10-CM

## 2021-08-04 PROCEDURE — 99213 OFFICE O/P EST LOW 20 MIN: CPT | Mod: CR,95,, | Performed by: STUDENT IN AN ORGANIZED HEALTH CARE EDUCATION/TRAINING PROGRAM

## 2021-08-04 PROCEDURE — 99213 PR OFFICE/OUTPT VISIT, EST, LEVL III, 20-29 MIN: ICD-10-PCS | Mod: CR,95,, | Performed by: STUDENT IN AN ORGANIZED HEALTH CARE EDUCATION/TRAINING PROGRAM

## 2021-08-07 ENCOUNTER — INFUSION (OUTPATIENT)
Dept: INFECTIOUS DISEASES | Facility: HOSPITAL | Age: 66
End: 2021-08-07
Attending: STUDENT IN AN ORGANIZED HEALTH CARE EDUCATION/TRAINING PROGRAM
Payer: MEDICARE

## 2021-08-07 VITALS
RESPIRATION RATE: 18 BRPM | HEART RATE: 62 BPM | OXYGEN SATURATION: 94 % | TEMPERATURE: 98 F | DIASTOLIC BLOOD PRESSURE: 58 MMHG | SYSTOLIC BLOOD PRESSURE: 104 MMHG

## 2021-08-07 DIAGNOSIS — U07.1 COVID-19: Primary | ICD-10-CM

## 2021-08-07 PROCEDURE — M0243 CASIRIVI AND IMDEVI INFUSION: HCPCS | Performed by: STUDENT IN AN ORGANIZED HEALTH CARE EDUCATION/TRAINING PROGRAM

## 2021-08-07 PROCEDURE — 25000003 PHARM REV CODE 250: Performed by: STUDENT IN AN ORGANIZED HEALTH CARE EDUCATION/TRAINING PROGRAM

## 2021-08-07 PROCEDURE — 63600175 PHARM REV CODE 636 W HCPCS: Performed by: STUDENT IN AN ORGANIZED HEALTH CARE EDUCATION/TRAINING PROGRAM

## 2021-08-07 RX ORDER — ALBUTEROL SULFATE 90 UG/1
2 AEROSOL, METERED RESPIRATORY (INHALATION)
Status: DISCONTINUED | OUTPATIENT
Start: 2021-08-07 | End: 2022-07-19 | Stop reason: CLARIF

## 2021-08-07 RX ORDER — SODIUM CHLORIDE 0.9 % (FLUSH) 0.9 %
10 SYRINGE (ML) INJECTION
Status: DISCONTINUED | OUTPATIENT
Start: 2021-08-07 | End: 2022-11-21 | Stop reason: ALTCHOICE

## 2021-08-07 RX ORDER — ONDANSETRON 2 MG/ML
4 INJECTION INTRAMUSCULAR; INTRAVENOUS ONCE AS NEEDED
Status: DISCONTINUED | OUTPATIENT
Start: 2021-08-07 | End: 2022-11-21 | Stop reason: ALTCHOICE

## 2021-08-07 RX ORDER — ACETAMINOPHEN 325 MG/1
650 TABLET ORAL ONCE AS NEEDED
Status: DISCONTINUED | OUTPATIENT
Start: 2021-08-07 | End: 2022-11-21 | Stop reason: ALTCHOICE

## 2021-08-07 RX ORDER — EPINEPHRINE 0.3 MG/.3ML
0.3 INJECTION SUBCUTANEOUS
Status: DISCONTINUED | OUTPATIENT
Start: 2021-08-07 | End: 2022-07-19 | Stop reason: CLARIF

## 2021-08-07 RX ORDER — DIPHENHYDRAMINE HYDROCHLORIDE 50 MG/ML
25 INJECTION INTRAMUSCULAR; INTRAVENOUS ONCE AS NEEDED
Status: DISCONTINUED | OUTPATIENT
Start: 2021-08-07 | End: 2022-11-21 | Stop reason: ALTCHOICE

## 2021-08-07 RX ADMIN — CASIRIVIMAB AND IMDEVIMAB 600 MG: 600; 600 INJECTION, SOLUTION, CONCENTRATE INTRAVENOUS at 12:08

## 2021-08-07 RX ADMIN — SODIUM CHLORIDE: 0.9 INJECTION, SOLUTION INTRAVENOUS at 12:08

## 2021-08-16 ENCOUNTER — PATIENT MESSAGE (OUTPATIENT)
Dept: FAMILY MEDICINE | Facility: CLINIC | Age: 66
End: 2021-08-16

## 2021-10-07 ENCOUNTER — PATIENT MESSAGE (OUTPATIENT)
Dept: ADMINISTRATIVE | Facility: HOSPITAL | Age: 66
End: 2021-10-07

## 2021-11-30 ENCOUNTER — LAB VISIT (OUTPATIENT)
Dept: LAB | Facility: CLINIC | Age: 66
End: 2021-11-30
Payer: MEDICARE

## 2021-11-30 ENCOUNTER — OFFICE VISIT (OUTPATIENT)
Dept: FAMILY MEDICINE | Facility: CLINIC | Age: 66
End: 2021-11-30
Payer: MEDICARE

## 2021-11-30 VITALS
TEMPERATURE: 98 F | HEIGHT: 66 IN | SYSTOLIC BLOOD PRESSURE: 124 MMHG | BODY MASS INDEX: 32.04 KG/M2 | RESPIRATION RATE: 18 BRPM | OXYGEN SATURATION: 97 % | HEART RATE: 76 BPM | DIASTOLIC BLOOD PRESSURE: 74 MMHG | WEIGHT: 199.38 LBS

## 2021-11-30 DIAGNOSIS — E89.0 POSTOPERATIVE HYPOTHYROIDISM: ICD-10-CM

## 2021-11-30 DIAGNOSIS — G25.81 RLS (RESTLESS LEGS SYNDROME): ICD-10-CM

## 2021-11-30 DIAGNOSIS — R73.03 PREDIABETES: ICD-10-CM

## 2021-11-30 DIAGNOSIS — R73.03 PREDIABETES: Primary | ICD-10-CM

## 2021-11-30 DIAGNOSIS — K21.00 GASTROESOPHAGEAL REFLUX DISEASE WITH ESOPHAGITIS WITHOUT HEMORRHAGE: ICD-10-CM

## 2021-11-30 DIAGNOSIS — E78.2 MIXED HYPERLIPIDEMIA: ICD-10-CM

## 2021-11-30 DIAGNOSIS — M16.11 PRIMARY OSTEOARTHRITIS OF RIGHT HIP: ICD-10-CM

## 2021-11-30 DIAGNOSIS — F33.1 MODERATE EPISODE OF RECURRENT MAJOR DEPRESSIVE DISORDER: ICD-10-CM

## 2021-11-30 LAB
ESTIMATED AVG GLUCOSE: 128 MG/DL (ref 68–131)
HBA1C MFR BLD: 6.1 % (ref 4–5.6)

## 2021-11-30 PROCEDURE — 83036 HEMOGLOBIN GLYCOSYLATED A1C: CPT | Performed by: STUDENT IN AN ORGANIZED HEALTH CARE EDUCATION/TRAINING PROGRAM

## 2021-11-30 PROCEDURE — 36415 COLL VENOUS BLD VENIPUNCTURE: CPT | Mod: ,,, | Performed by: STUDENT IN AN ORGANIZED HEALTH CARE EDUCATION/TRAINING PROGRAM

## 2021-11-30 PROCEDURE — 99214 OFFICE O/P EST MOD 30 MIN: CPT | Mod: S$GLB,,, | Performed by: STUDENT IN AN ORGANIZED HEALTH CARE EDUCATION/TRAINING PROGRAM

## 2021-11-30 PROCEDURE — 36415 PR COLLECTION VENOUS BLOOD,VENIPUNCTURE: ICD-10-PCS | Mod: ,,, | Performed by: STUDENT IN AN ORGANIZED HEALTH CARE EDUCATION/TRAINING PROGRAM

## 2021-11-30 PROCEDURE — 99214 PR OFFICE/OUTPT VISIT, EST, LEVL IV, 30-39 MIN: ICD-10-PCS | Mod: S$GLB,,, | Performed by: STUDENT IN AN ORGANIZED HEALTH CARE EDUCATION/TRAINING PROGRAM

## 2021-11-30 RX ORDER — ERGOCALCIFEROL 1.25 MG/1
10000 CAPSULE ORAL DAILY
COMMUNITY

## 2022-01-28 ENCOUNTER — PATIENT MESSAGE (OUTPATIENT)
Dept: FAMILY MEDICINE | Facility: CLINIC | Age: 67
End: 2022-01-28
Payer: MEDICARE

## 2022-01-28 DIAGNOSIS — F33.1 MODERATE EPISODE OF RECURRENT MAJOR DEPRESSIVE DISORDER: ICD-10-CM

## 2022-01-28 RX ORDER — SERTRALINE HYDROCHLORIDE 100 MG/1
100 TABLET, FILM COATED ORAL DAILY
Qty: 30 TABLET | Refills: 5 | Status: SHIPPED | OUTPATIENT
Start: 2022-01-28 | End: 2022-01-31 | Stop reason: SDUPTHER

## 2022-01-31 ENCOUNTER — PATIENT MESSAGE (OUTPATIENT)
Dept: FAMILY MEDICINE | Facility: CLINIC | Age: 67
End: 2022-01-31
Payer: MEDICARE

## 2022-01-31 ENCOUNTER — TELEPHONE (OUTPATIENT)
Dept: FAMILY MEDICINE | Facility: CLINIC | Age: 67
End: 2022-01-31
Payer: MEDICARE

## 2022-01-31 DIAGNOSIS — F33.1 MODERATE EPISODE OF RECURRENT MAJOR DEPRESSIVE DISORDER: ICD-10-CM

## 2022-01-31 DIAGNOSIS — R10.9 FLANK PAIN: Primary | ICD-10-CM

## 2022-01-31 RX ORDER — SERTRALINE HYDROCHLORIDE 100 MG/1
100 TABLET, FILM COATED ORAL DAILY
Qty: 30 TABLET | Refills: 5 | Status: SHIPPED | OUTPATIENT
Start: 2022-01-31 | End: 2022-08-03 | Stop reason: SDUPTHER

## 2022-01-31 NOTE — TELEPHONE ENCOUNTER
Pt experiencing low back pain and is unsure if it could be her kidneys  Pt requesting urinalysis orders to be placed.  Please advise

## 2022-01-31 NOTE — TELEPHONE ENCOUNTER
Patient returning phone call in regards to urine orders. Patient has been scheduled for POCT Urine and urine culture for 2/1/22 at 9am.

## 2022-01-31 NOTE — TELEPHONE ENCOUNTER
----- Message from Angle Johansen sent at 1/31/2022  9:40 AM CST -----  Type: Needs Medical Advice  Who Called:  Pt  Best Call Back Number: [phone] (home) [phone] (work)  Additional Information: Pt sts she is experiencing low back pain and is unsure if it could be her kidneys-pt requesting urinalysis orders to be placed and call back to Schedule--please advise--thank you

## 2022-01-31 NOTE — TELEPHONE ENCOUNTER
LM regarding provider placing urine orders to be done and collected . Patient was requested to give the office a call back to schedule a nurse visit to get these orders completed.

## 2022-01-31 NOTE — TELEPHONE ENCOUNTER
Orders placed for poct urine and to send for culture.  Encourage hydration and monitoring of symptoms

## 2022-02-01 ENCOUNTER — CLINICAL SUPPORT (OUTPATIENT)
Dept: FAMILY MEDICINE | Facility: CLINIC | Age: 67
End: 2022-02-01
Payer: MEDICARE

## 2022-02-01 DIAGNOSIS — R10.9 FLANK PAIN: ICD-10-CM

## 2022-02-01 DIAGNOSIS — N30.00 ACUTE CYSTITIS WITHOUT HEMATURIA: Primary | ICD-10-CM

## 2022-02-01 LAB
BILIRUB SERPL-MCNC: NEGATIVE MG/DL
BLOOD URINE, POC: NEGATIVE
CLARITY, POC UA: ABNORMAL
COLOR, POC UA: ABNORMAL
GLUCOSE UR QL STRIP: NEGATIVE
KETONES UR QL STRIP: NEGATIVE
LEUKOCYTE ESTERASE URINE, POC: ABNORMAL
NITRITE, POC UA: POSITIVE
PH, POC UA: 6
PROTEIN, POC: NEGATIVE
SPECIFIC GRAVITY, POC UA: 1.03
UROBILINOGEN, POC UA: 0.2

## 2022-02-01 PROCEDURE — 81002 URINALYSIS NONAUTO W/O SCOPE: CPT | Mod: PBBFAC,PN

## 2022-02-01 RX ORDER — CIPROFLOXACIN 250 MG/1
250 TABLET, FILM COATED ORAL EVERY 12 HOURS
Qty: 6 TABLET | Refills: 0 | Status: SHIPPED | OUTPATIENT
Start: 2022-02-01 | End: 2022-02-04

## 2022-02-01 NOTE — PROGRESS NOTES
Collected urine from patient in clinic for urine POCT urine dipstick per orders, results in chart

## 2022-03-18 ENCOUNTER — PATIENT MESSAGE (OUTPATIENT)
Dept: ADMINISTRATIVE | Facility: HOSPITAL | Age: 67
End: 2022-03-18
Payer: MEDICARE

## 2022-04-25 ENCOUNTER — PATIENT MESSAGE (OUTPATIENT)
Dept: ORTHOPEDICS | Facility: CLINIC | Age: 67
End: 2022-04-25

## 2022-05-03 ENCOUNTER — OFFICE VISIT (OUTPATIENT)
Dept: ORTHOPEDICS | Facility: CLINIC | Age: 67
End: 2022-05-03
Payer: MEDICARE

## 2022-05-03 VITALS — BODY MASS INDEX: 31.98 KG/M2 | WEIGHT: 199 LBS | HEIGHT: 66 IN

## 2022-05-03 DIAGNOSIS — M22.41 CHONDROMALACIA OF RIGHT PATELLA: Primary | ICD-10-CM

## 2022-05-03 DIAGNOSIS — M17.11 PATELLOFEMORAL ARTHRITIS OF RIGHT KNEE: ICD-10-CM

## 2022-05-03 DIAGNOSIS — M17.12 PATELLOFEMORAL ARTHRITIS OF LEFT KNEE: ICD-10-CM

## 2022-05-03 PROCEDURE — 99213 PR OFFICE/OUTPT VISIT, EST, LEVL III, 20-29 MIN: ICD-10-PCS | Mod: 25,S$GLB,, | Performed by: ORTHOPAEDIC SURGERY

## 2022-05-03 PROCEDURE — 20610 DRAIN/INJ JOINT/BURSA W/O US: CPT | Mod: 50,S$GLB,, | Performed by: ORTHOPAEDIC SURGERY

## 2022-05-03 PROCEDURE — 99213 OFFICE O/P EST LOW 20 MIN: CPT | Mod: 25,S$GLB,, | Performed by: ORTHOPAEDIC SURGERY

## 2022-05-03 PROCEDURE — 20610 LARGE JOINT ASPIRATION/INJECTION: L KNEE: ICD-10-PCS | Mod: 50,S$GLB,, | Performed by: ORTHOPAEDIC SURGERY

## 2022-05-03 RX ORDER — TRIAMCINOLONE ACETONIDE 40 MG/ML
40 INJECTION, SUSPENSION INTRA-ARTICULAR; INTRAMUSCULAR
Status: DISCONTINUED | OUTPATIENT
Start: 2022-05-03 | End: 2022-05-03 | Stop reason: HOSPADM

## 2022-05-03 RX ADMIN — TRIAMCINOLONE ACETONIDE 40 MG: 40 INJECTION, SUSPENSION INTRA-ARTICULAR; INTRAMUSCULAR at 01:05

## 2022-05-03 NOTE — PROGRESS NOTES
[facility]    Subjective:     Chief Complaint:   Chief Complaint   Patient presents with    Right Knee - Pain     Patient is having right knee pain, this pains started last Saturday, burning sensation, trouble bending and walking.       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    MVP (mitral valve prolapse)     RLS (restless legs syndrome)     Wears dentures     UPPER AND LOWER BRIDGE    Wears glasses        Past Surgical History:   Procedure Laterality Date    BACK SURGERY      nerve stimulator    HIP ARTHROPLASTY Right 6/25/2018    Procedure: ARTHROPLASTY, HIP;  Surgeon: Prosper Hodge MD;  Location: [facility];  Service: Orthopedics;  Laterality: Right;  william    HYSTERECTOMY      implanted spinal cord stimulator      NONFUNCTIONING X 15 YEARS    SACRAL NERVE STIMULATOR PLACEMENT Left     SKIN CANCER EXCISION      THYROIDECTOMY N/A 3/9/2020    Procedure: TOTAL THYROIDECTOMY;  Surgeon: Kelsie Rodgers MD;  Location: [facility] OR;  Service: General;  Laterality: N/A;    TONSILLECTOMY         Current Outpatient Medications   Medication Sig    acyclovir (ZOVIRAX) 400 MG tablet acyclovir 400 mg tablet   TAKE 1 TABLET BY MOUTH TWICE DAILY    ergocalciferol (ERGOCALCIFEROL) 50,000 unit Cap Take 10,000 Units by mouth once daily.    esomeprazole magnesium (NEXIUM ORAL)     rOPINIRole (REQUIP) 1 MG tablet as needed.     sertraline (ZOLOFT) 100 MG tablet Take 1 tablet (100 mg total) by mouth once daily.    SYNTHROID 125 mcg tablet Take 112 mcg by mouth once daily.     Current Facility-Administered Medications   Medication    acetaminophen tablet 650 mg    albuterol inhaler 2 puff    diphenhydrAMINE injection 25 mg    EPINEPHrine (EPIPEN) 0.3 mg/0.3 mL pen injection 0.3 mg    methylPREDNISolone sodium succinate injection 40 mg    ondansetron injection 4 mg    sodium chloride 0.9% 500 mL  flush bag    sodium chloride 0.9% flush 10 mL       Review of patient's allergies indicates:   Allergen Reactions    Morphine Swelling     NECK SWELLED    Demerol (pf) [meperidine (pf)] Other (See Comments)     HEART RACES, BOUNCES OFF WALL    Erythromycin Itching    Lidocaine Other (See Comments)       Family History   Problem Relation Age of Onset    Diabetes Father         late onset    Glaucoma Father     Diabetes Other         nephew    Colon cancer Neg Hx     Breast cancer Neg Hx        Social History     Socioeconomic History    Marital status:    Tobacco Use    Smoking status: Former Smoker     Packs/day: 1.00     Years: 37.00     Pack years: 37.00     Quit date: 2009     Years since quittin.3    Smokeless tobacco: Never Used   Substance and Sexual Activity    Alcohol use: Yes     Comment: RARELY    Drug use: No       History of present illness:  Patient comes in today for her right knee.  She is having achy pain in the right knee.  She has had this in the past but this is worse.  She has a lot of burning.      Review of Systems:    Constitution: Negative for chills, fever, and sweats.  Negative for unexplained weight loss.    HENT:  Negative for headaches and blurry vision.    Cardiovascular:Negative for chest pain or irregular heart beat. Negative for hypertension.    Respiratory:  Negative for cough and shortness of breath.    Gastrointestinal: Negative for abdominal pain, heartburn, melena, nausea, and vomitting.    Genitourinary:  Negative bladder incontinence and dysuria.    Musculoskeletal:  See HPI for details.     Neurological: Negative for numbness.    Psychiatric/Behavioral: Negative for depression.  The patient is not nervous/anxious.      Endocrine: Negative for polyuria    Hematologic/Lymphatic: Negative for bleeding problem.  Does not bruise/bleed easily.    Skin: Negative for poor would healing and rash    Objective:      Physical Examination:    Vital Signs:   There were no vitals filed for this visit.    Body mass index is 32.12 kg/m².    This a well-developed, well nourished patient in no acute distress.  They are alert and oriented and cooperative to examination.        Patient appears to be stated age.  She has got full range of motion of her bilateral knees.  She has anterior crepitus bilaterally.  She has a 2+ effusion on the right none on the left.  Negative straight leg raises  Pertinent New Results:    XRAY Report / Interpretation:   Three views of the bilateral knees demonstrate mild to moderate patellofemoral OA    Assessment/Plan:      Osteoarthritis bilateral knees.  Primarily patellofemoral.  I injected both knees with Kenalog and lidocaine.  Follow-up p.r.n.      This note was created using Dragon voice recognition software that occasionally misinterpreted phrases or words.

## 2022-05-03 NOTE — PROCEDURES
Large Joint Aspiration/Injection: R knee    Date/Time: 5/3/2022 1:00 PM  Performed by: Prosper Hodge MD  Authorized by: Prosper Hodge MD     Consent Done?:  Yes (Verbal)  Indications:  Pain  Site marked: the procedure site was marked    Timeout: prior to procedure the correct patient, procedure, and site was verified    Prep: patient was prepped and draped in usual sterile fashion      Local anesthesia used?: Yes    Local anesthetic:  Lidocaine 1% without epinephrine  Ultrasonic Guidance for needle placement?: No    Location:  Knee  Site:  R knee  Medications:  40 mg triamcinolone acetonide 40 mg/mL  Patient tolerance:  Patient tolerated the procedure well with no immediate complications

## 2022-05-18 ENCOUNTER — OFFICE VISIT (OUTPATIENT)
Dept: FAMILY MEDICINE | Facility: CLINIC | Age: 67
End: 2022-05-18
Payer: MEDICARE

## 2022-05-18 VITALS
SYSTOLIC BLOOD PRESSURE: 114 MMHG | TEMPERATURE: 98 F | OXYGEN SATURATION: 94 % | HEIGHT: 65 IN | HEART RATE: 93 BPM | RESPIRATION RATE: 18 BRPM | DIASTOLIC BLOOD PRESSURE: 64 MMHG | WEIGHT: 196 LBS | BODY MASS INDEX: 32.65 KG/M2

## 2022-05-18 DIAGNOSIS — R10.31 RIGHT LOWER QUADRANT PAIN: ICD-10-CM

## 2022-05-18 DIAGNOSIS — R10.9 RIGHT FLANK PAIN: Primary | ICD-10-CM

## 2022-05-18 LAB
BACTERIA #/AREA URNS HPF: ABNORMAL /HPF
MICROSCOPIC COMMENT: ABNORMAL
WBC #/AREA URNS HPF: 40 /HPF (ref 0–5)

## 2022-05-18 PROCEDURE — 99999 PR PBB SHADOW E&M-EST. PATIENT-LVL IV: ICD-10-PCS | Mod: PBBFAC,,, | Performed by: STUDENT IN AN ORGANIZED HEALTH CARE EDUCATION/TRAINING PROGRAM

## 2022-05-18 PROCEDURE — 87086 URINE CULTURE/COLONY COUNT: CPT | Performed by: STUDENT IN AN ORGANIZED HEALTH CARE EDUCATION/TRAINING PROGRAM

## 2022-05-18 PROCEDURE — 99999 PR PBB SHADOW E&M-EST. PATIENT-LVL IV: CPT | Mod: PBBFAC,,, | Performed by: STUDENT IN AN ORGANIZED HEALTH CARE EDUCATION/TRAINING PROGRAM

## 2022-05-18 PROCEDURE — 99214 PR OFFICE/OUTPT VISIT, EST, LEVL IV, 30-39 MIN: ICD-10-PCS | Mod: S$PBB,,, | Performed by: STUDENT IN AN ORGANIZED HEALTH CARE EDUCATION/TRAINING PROGRAM

## 2022-05-18 PROCEDURE — 81000 URINALYSIS NONAUTO W/SCOPE: CPT | Performed by: STUDENT IN AN ORGANIZED HEALTH CARE EDUCATION/TRAINING PROGRAM

## 2022-05-18 PROCEDURE — 99214 OFFICE O/P EST MOD 30 MIN: CPT | Mod: S$PBB,,, | Performed by: STUDENT IN AN ORGANIZED HEALTH CARE EDUCATION/TRAINING PROGRAM

## 2022-05-18 PROCEDURE — 99214 OFFICE O/P EST MOD 30 MIN: CPT | Mod: PBBFAC,PN | Performed by: STUDENT IN AN ORGANIZED HEALTH CARE EDUCATION/TRAINING PROGRAM

## 2022-05-18 RX ORDER — METHOCARBAMOL 500 MG/1
500 TABLET, FILM COATED ORAL 3 TIMES DAILY PRN
Qty: 30 TABLET | Refills: 0 | Status: SHIPPED | OUTPATIENT
Start: 2022-05-18 | End: 2022-05-28

## 2022-05-18 NOTE — PATIENT INSTRUCTIONS

## 2022-05-18 NOTE — PROGRESS NOTES
Subjective:       Patient ID: Gloria Manuel is a 66 y.o. female.    Chief Complaint: Back Pain and Flank Pain (Right side)    10+ days of flank/low back pain radiating to the right abdomen.  Worse with touch and contact with clothing or leaning on the area.  Not particularly worse with movement. Sometimes with lying down and sitting up. No association with urination or defecation.  No dysuria. No bleeding. No fevers. No nausea/no diarrhea/constipation.  No rash.  She has had a hysterectomy. She has a gallbladder and appendix.   She has a history of shingles and it feels similar without rash.  She is having difficulty getting comfortable with sleep.  She played [employer] on mothers day and thought it may have been from that.     Review of Systems   Constitutional: Negative for activity change, appetite change, fatigue and fever.   Respiratory: Negative for shortness of breath.    Cardiovascular: Negative for chest pain.   Gastrointestinal: Positive for abdominal pain. Negative for change in bowel habit and change in bowel habit.   Genitourinary: Positive for flank pain. Negative for dysuria and hematuria.   Musculoskeletal: Positive for back pain.   Integumentary:  Negative for rash.   Psychiatric/Behavioral: Negative for sleep disturbance.         Objective:      Physical Exam  Constitutional:       General: She is not in acute distress.     Appearance: Normal appearance. She is obese. She is not ill-appearing.   Eyes:      Conjunctiva/sclera: Conjunctivae normal.   Cardiovascular:      Rate and Rhythm: Normal rate and regular rhythm.      Pulses: Normal pulses.      Heart sounds: Normal heart sounds. No murmur heard.  Pulmonary:      Effort: Pulmonary effort is normal. No respiratory distress.      Breath sounds: Normal breath sounds. No wheezing.   Abdominal:      General: There is no distension.      Palpations: Abdomen is soft. There is no mass.      Tenderness: There is no abdominal tenderness. There  is no right CVA tenderness, left CVA tenderness, guarding or rebound.      Hernia: No hernia is present.   Musculoskeletal:      Right lower leg: No edema.      Left lower leg: No edema.   Skin:     General: Skin is warm and dry.      Findings: No rash.   Neurological:      Mental Status: She is alert. Mental status is at baseline.      Gait: Gait normal.   Psychiatric:         Mood and Affect: Mood normal.         Behavior: Behavior normal.         Thought Content: Thought content normal.         Judgment: Judgment normal.         Assessment:       1. Right flank pain    2. Right lower quadrant pain        Plan:       Problem List Items Addressed This Visit    None     Visit Diagnoses     Right flank pain    -  Primary    Relevant Medications    methocarbamoL (ROBAXIN) 500 MG Tab    Other Relevant Orders    Urinalysis Microscopic    Urine culture    Right lower quadrant pain        Relevant Orders    US Abdomen Complete

## 2022-05-20 ENCOUNTER — PATIENT MESSAGE (OUTPATIENT)
Dept: FAMILY MEDICINE | Facility: CLINIC | Age: 67
End: 2022-05-20
Payer: MEDICARE

## 2022-05-24 ENCOUNTER — PATIENT MESSAGE (OUTPATIENT)
Dept: FAMILY MEDICINE | Facility: CLINIC | Age: 67
End: 2022-05-24
Payer: MEDICARE

## 2022-05-24 ENCOUNTER — CLINICAL SUPPORT (OUTPATIENT)
Dept: FAMILY MEDICINE | Facility: CLINIC | Age: 67
End: 2022-05-24
Payer: MEDICARE

## 2022-05-25 ENCOUNTER — TELEPHONE (OUTPATIENT)
Dept: FAMILY MEDICINE | Facility: CLINIC | Age: 67
End: 2022-05-25
Payer: MEDICARE

## 2022-05-25 NOTE — TELEPHONE ENCOUNTER
----- Message from Calli Solis, Patient Care Assistant sent at 5/25/2022  1:24 PM CDT -----  Regarding: advice  Contact: haydee ortiz  Type: Needs Medical Advice  Who Called:  haydee feliberto ortiz  Best Call Back Number:      Additional Information: haydee feliberto ortiz states she would like a callback regarding an specimen. Please call to advise. Thanks!

## 2022-05-28 LAB
BACTERIA UR CULT: NORMAL
BACTERIA UR CULT: NORMAL

## 2022-06-01 ENCOUNTER — TELEPHONE (OUTPATIENT)
Dept: GASTROENTEROLOGY | Facility: CLINIC | Age: 67
End: 2022-06-01
Payer: MEDICARE

## 2022-06-01 ENCOUNTER — PATIENT MESSAGE (OUTPATIENT)
Dept: FAMILY MEDICINE | Facility: CLINIC | Age: 67
End: 2022-06-01
Payer: MEDICARE

## 2022-06-01 ENCOUNTER — HOSPITAL ENCOUNTER (OUTPATIENT)
Dept: RADIOLOGY | Facility: HOSPITAL | Age: 67
Discharge: HOME | End: 2022-06-01
Attending: STUDENT IN AN ORGANIZED HEALTH CARE EDUCATION/TRAINING PROGRAM
Payer: MEDICARE

## 2022-06-01 DIAGNOSIS — R10.31 RIGHT LOWER QUADRANT PAIN: ICD-10-CM

## 2022-06-01 DIAGNOSIS — Z12.11 SCREENING FOR COLON CANCER: ICD-10-CM

## 2022-06-01 DIAGNOSIS — R10.31 RIGHT LOWER QUADRANT PAIN: Primary | ICD-10-CM

## 2022-06-01 PROCEDURE — 76700 US EXAM ABDOM COMPLETE: CPT | Mod: TC,PO

## 2022-06-01 NOTE — TELEPHONE ENCOUNTER
----- Message from Anali Butts sent at 6/1/2022  3:19 PM CDT -----  Regarding: colonoscopy  Contact: Patient  Patient need to schedule colonoscopy appointment, please call back at [phone] (home) [phone] (work)    Case number [id number]

## 2022-06-09 ENCOUNTER — OFFICE VISIT (OUTPATIENT)
Dept: GASTROENTEROLOGY | Facility: CLINIC | Age: 67
End: 2022-06-09
Payer: MEDICARE

## 2022-06-09 VITALS
SYSTOLIC BLOOD PRESSURE: 123 MMHG | BODY MASS INDEX: 32.73 KG/M2 | HEIGHT: 65 IN | WEIGHT: 196.44 LBS | RESPIRATION RATE: 16 BRPM | DIASTOLIC BLOOD PRESSURE: 71 MMHG | HEART RATE: 82 BPM | TEMPERATURE: 98 F

## 2022-06-09 DIAGNOSIS — R10.31 RIGHT LOWER QUADRANT PAIN: Primary | ICD-10-CM

## 2022-06-09 DIAGNOSIS — K21.9 GASTROESOPHAGEAL REFLUX DISEASE, UNSPECIFIED WHETHER ESOPHAGITIS PRESENT: ICD-10-CM

## 2022-06-09 DIAGNOSIS — Z01.818 PRE-OP TESTING: ICD-10-CM

## 2022-06-09 DIAGNOSIS — K76.0 FATTY LIVER: ICD-10-CM

## 2022-06-09 PROCEDURE — 99203 PR OFFICE/OUTPT VISIT, NEW, LEVL III, 30-44 MIN: ICD-10-PCS | Mod: S$PBB,,,

## 2022-06-09 PROCEDURE — 99215 OFFICE O/P EST HI 40 MIN: CPT | Mod: PBBFAC,PN

## 2022-06-09 PROCEDURE — 99203 OFFICE O/P NEW LOW 30 MIN: CPT | Mod: S$PBB,,,

## 2022-06-09 PROCEDURE — 99999 PR PBB SHADOW E&M-EST. PATIENT-LVL V: CPT | Mod: PBBFAC,,,

## 2022-06-09 PROCEDURE — 99999 PR PBB SHADOW E&M-EST. PATIENT-LVL V: ICD-10-PCS | Mod: PBBFAC,,,

## 2022-06-09 NOTE — PROGRESS NOTES
Subjective:       Patient ID: Gloria Manuel is a 66 y.o. female Body mass index is 32.69 kg/m².    Chief Complaint: Abdominal Pain    This patient is new to me.     Abdominal Pain  This is a new problem. The current episode started more than 1 month ago (started the day after Mother's day this year). The onset quality is sudden. The problem occurs intermittently. The problem has been resolved. The pain is located in the RLQ (reports the pain started in her right lower back area with touch only and radiated to her RLQ at times). The pain is at a severity of 0/10. The patient is experiencing no pain (currently). The quality of the pain is burning (reports pain felt like shingles). Associated symptoms include arthralgias (R knee pain; occurred last easter 2021). Pertinent negatives include no anorexia, belching, constipation, diarrhea, dysuria, fever, flatus, frequency, headaches, hematochezia, hematuria, melena, myalgias, nausea, vomiting or weight loss. The pain is aggravated by palpation. The pain is relieved by nothing. Treatments tried: ibuprofen. The treatment provided significant relief. Her past medical history is significant for GERD (well managed on nexium 40 mg once daily; denies heartburn while taking nexium; denies dysphagia). There is no history of abdominal surgery, colon cancer, Crohn's disease, gallstones, irritable bowel syndrome, pancreatitis, PUD or ulcerative colitis. Patient's medical history does not include kidney stones and UTI.     Review of Systems   Constitutional: Negative for activity change, appetite change, chills, diaphoresis, fatigue, fever, unexpected weight change and weight loss.   HENT: Negative for sore throat and trouble swallowing.    Respiratory: Negative for cough, choking and shortness of breath.    Cardiovascular: Negative for chest pain.   Gastrointestinal: Positive for abdominal pain. Negative for abdominal distention, anal bleeding, anorexia, blood in stool,  constipation, diarrhea, flatus, hematochezia, melena, nausea, rectal pain and vomiting.   Genitourinary: Negative for dysuria, frequency and hematuria.   Musculoskeletal: Positive for arthralgias (R knee pain; occurred last 2021) and back pain (denies currently). Negative for myalgias.   Neurological: Negative for headaches.       No LMP recorded. Patient has had a hysterectomy.  Past Medical History:   Diagnosis Date    Anxiety     Arthritis     Back pain     Cancer     Depression     Femoral acetabular impingement 2017    GERD (gastroesophageal reflux disease)     MVP (mitral valve prolapse)     RLS (restless legs syndrome)     Wears dentures     UPPER AND LOWER BRIDGE    Wears glasses      Past Surgical History:   Procedure Laterality Date    BACK SURGERY      nerve stimulator    COLONOSCOPY      HIP ARTHROPLASTY Right 2018    Procedure: ARTHROPLASTY, HIP;  Surgeon: Prosper Hodge MD;  Location: [facility];  Service: Orthopedics;  Laterality: Right;  william    HYSTERECTOMY      implanted spinal cord stimulator      NONFUNCTIONING X 15 YEARS    SACRAL NERVE STIMULATOR PLACEMENT Left     SKIN CANCER EXCISION      THYROIDECTOMY N/A 3/9/2020    Procedure: TOTAL THYROIDECTOMY;  Surgeon: Kelsie Rodgers MD;  Location: [facility] OR;  Service: General;  Laterality: N/A;    TONSILLECTOMY       Family History   Problem Relation Age of Onset    Diabetes Father         late onset    Glaucoma Father     Diabetes Other         nephew    Colon cancer Neg Hx     Breast cancer Neg Hx     Colon polyps Neg Hx      Social History     Tobacco Use    Smoking status: Former Smoker     Packs/day: 1.00     Years: 37.00     Pack years: 37.00     Quit date: 2009     Years since quittin.4    Smokeless tobacco: Never Used   Substance Use Topics    Alcohol use: Yes     Comment: RARELY    Drug use: No     Wt Readings from Last 10 Encounters:   22 89.1 kg (196 lb 6.9 oz)   22 88.9 kg (195  lb 15.8 oz)   05/03/22 90.3 kg (199 lb)   11/30/21 90.4 kg (199 lb 6.4 oz)   05/25/21 90.3 kg (199 lb)   05/17/21 90.7 kg (200 lb)   03/09/20 94.5 kg (208 lb 5.4 oz)   03/02/20 89 kg (196 lb 3.4 oz)   09/13/18 91.2 kg (201 lb)   07/10/18 91.2 kg (201 lb)     Lab Results   Component Value Date    WBC 6.58 03/02/2020    HGB 13.4 03/02/2020    HCT 41.9 03/02/2020    MCV 88 03/02/2020     03/02/2020     CMP  Sodium   Date Value Ref Range Status   03/10/2020 140 136 - 145 mmol/L Final     Potassium   Date Value Ref Range Status   03/10/2020 3.6 3.5 - 5.1 mmol/L Final     Chloride   Date Value Ref Range Status   03/10/2020 107 95 - 110 mmol/L Final     CO2   Date Value Ref Range Status   03/10/2020 26 23 - 29 mmol/L Final     Glucose   Date Value Ref Range Status   03/10/2020 133 (H) 70 - 110 mg/dL Final     BUN   Date Value Ref Range Status   03/10/2020 9 8 - 23 mg/dL Final     Creatinine   Date Value Ref Range Status   03/10/2020 0.7 0.5 - 1.4 mg/dL Final     Calcium   Date Value Ref Range Status   03/10/2020 9.5 8.7 - 10.5 mg/dL Final     Total Protein   Date Value Ref Range Status   06/11/2018 6.8 6.0 - 8.4 g/dL Final     Albumin   Date Value Ref Range Status   06/11/2018 3.7 3.5 - 5.2 g/dL Final     Total Bilirubin   Date Value Ref Range Status   06/11/2018 0.3 0.1 - 1.0 mg/dL Final     Comment:     For infants and newborns, interpretation of results should be based  on gestational age, weight and in agreement with clinical  observations.  Premature Infant recommended reference ranges:  Up to 24 hours.............<8.0 mg/dL  Up to 48 hours............<12.0 mg/dL  3-5 days..................<15.0 mg/dL  6-29 days.................<15.0 mg/dL       Alkaline Phosphatase   Date Value Ref Range Status   06/11/2018 80 55 - 135 U/L Final     AST   Date Value Ref Range Status   06/11/2018 18 10 - 40 U/L Final     ALT   Date Value Ref Range Status   06/11/2018 22 10 - 44 U/L Final     Anion Gap   Date Value Ref Range  Status   03/10/2020 7 (L) 8 - 16 mmol/L Final     eGFR if    Date Value Ref Range Status   03/10/2020 >60.0 >60 mL/min/1.73 m^2 Final     eGFR if non    Date Value Ref Range Status   03/10/2020 >60.0 >60 mL/min/1.73 m^2 Final     Comment:     Calculation used to obtain the estimated glomerular filtration  rate (eGFR) is the CKD-EPI equation.        Reviewed prior medical records including radiology report of abdominal US 06/01/22 & endoscopy history (see surgical history).    Objective:      Physical Exam  Vitals and nursing note reviewed.   Constitutional:       General: She is not in acute distress.     Appearance: Normal appearance. She is obese. She is not ill-appearing.   HENT:      Mouth/Throat:      Lips: Pink. No lesions.      Mouth: Mucous membranes are moist.   Eyes:      Extraocular Movements: Extraocular movements intact.      Pupils: Pupils are equal, round, and reactive to light.   Cardiovascular:      Rate and Rhythm: Normal rate and regular rhythm.   Pulmonary:      Effort: Pulmonary effort is normal. No respiratory distress.      Breath sounds: Normal breath sounds.   Abdominal:      General: Abdomen is protuberant. Bowel sounds are normal. There is no distension or abdominal bruit. There are no signs of injury.      Palpations: Abdomen is soft. There is no shifting dullness, fluid wave, hepatomegaly, splenomegaly or mass.      Tenderness: There is no abdominal tenderness. There is no right CVA tenderness, left CVA tenderness, guarding or rebound. Negative signs include Garcai's sign, Rovsing's sign and McBurney's sign.      Hernia: No hernia is present.   Skin:     General: Skin is warm and dry.      Coloration: Skin is not jaundiced or pale.   Neurological:      Mental Status: She is alert and oriented to person, place, and time.   Psychiatric:         Attention and Perception: Attention normal.         Mood and Affect: Mood normal.         Speech: Speech normal.          Behavior: Behavior normal.         Assessment:       1. Right lower quadrant pain    2. Fatty liver    3. Gastroesophageal reflux disease, unspecified whether esophagitis present    4. Pre-op testing        Plan:       Right lower quadrant pain  - schedule Colonoscopy, discussed procedure with the patient, including risks and benefits, patient verbalized understanding  If no improvement in symptoms or symptoms worsen, call/follow-up at clinic or go to ER.  -Follow-up with PCP for continued evaluation and management    Fatty liver  -For fatty liver recommend: low fat, low cholesterol diet, maintain good control of blood sugars and cholesterol levels, exercise, weight loss (if overweight), minimize/avoid alcohol and tylenol products, & follow-up with PCP for continued evaluation and management; if specialist is needed, recommend seeing hepatology.    Gastroesophageal reflux disease, unspecified whether esophagitis present  -different types of medications used to treat reflux: antacids can be used PRN for breakthrough heartburn symptoms by reducing stomach acid that is already produced, H2 blockers work by limiting the amount acid production, & PPI's work to block acid production and are taken daily, patient verbalized understanding.  - lifestyle modifications to help control/reduce reflux/abdominal pain include: avoid large meals, avoid eating within 2-3 hours of bedtime (avoid late night eating & lying down soon after eating), elevate head of bed if nocturnal symptoms are present, smoking cessation (if current smoker), & weight loss (if overweight).   -avoid known foods which trigger reflux symptoms & to minimize/avoid high-fat foods, chocolate, caffeine, citrus, alcohol, & tomato products.  -Avoid/limit use of NSAID's, since they can cause GI upset, bleeding, and/or ulcers. If needed, take with food.   -Continue: Nexium 40 mg once daily  -Consider EGD if symptoms worsen    Pre-op testing  -     COVID-19 Routine  Screening; Future    Follow up in about 4 weeks (around 7/7/2022), or if symptoms worsen or fail to improve.      If no improvement in symptoms or symptoms worsen, call/follow-up at clinic or go to ER.        30 minutes of total time spent on the encounter, which includes face to face time and non-face to face time preparing to see the patient (eg, review of tests), Obtaining and/or reviewing separately obtained history, Documenting clinical information in the electronic or other health record, Independently interpreting results (not separately reported) and communicating results to the patient/family/caregiver, or Care coordination (not separately reported).

## 2022-06-14 ENCOUNTER — OFFICE VISIT (OUTPATIENT)
Dept: ORTHOPEDICS | Facility: CLINIC | Age: 67
End: 2022-06-14
Payer: MEDICARE

## 2022-06-14 VITALS — BODY MASS INDEX: 38.48 KG/M2 | HEIGHT: 60 IN | WEIGHT: 196 LBS

## 2022-06-14 DIAGNOSIS — M16.12 PRIMARY OSTEOARTHRITIS OF LEFT HIP: Primary | ICD-10-CM

## 2022-06-14 DIAGNOSIS — M22.41 CHONDROMALACIA OF RIGHT PATELLA: ICD-10-CM

## 2022-06-14 PROCEDURE — 20610 LARGE JOINT ASPIRATION/INJECTION: R KNEE: ICD-10-PCS | Mod: RT,S$GLB,, | Performed by: ORTHOPAEDIC SURGERY

## 2022-06-14 PROCEDURE — 99213 PR OFFICE/OUTPT VISIT, EST, LEVL III, 20-29 MIN: ICD-10-PCS | Mod: 25,S$GLB,, | Performed by: ORTHOPAEDIC SURGERY

## 2022-06-14 PROCEDURE — 99213 OFFICE O/P EST LOW 20 MIN: CPT | Mod: 25,S$GLB,, | Performed by: ORTHOPAEDIC SURGERY

## 2022-06-14 PROCEDURE — 20610 DRAIN/INJ JOINT/BURSA W/O US: CPT | Mod: RT,S$GLB,, | Performed by: ORTHOPAEDIC SURGERY

## 2022-06-14 RX ORDER — TRIAMCINOLONE ACETONIDE 40 MG/ML
40 INJECTION, SUSPENSION INTRA-ARTICULAR; INTRAMUSCULAR
Status: DISCONTINUED | OUTPATIENT
Start: 2022-06-14 | End: 2022-06-14 | Stop reason: HOSPADM

## 2022-06-14 RX ADMIN — TRIAMCINOLONE ACETONIDE 40 MG: 40 INJECTION, SUSPENSION INTRA-ARTICULAR; INTRAMUSCULAR at 01:06

## 2022-06-14 NOTE — PROGRESS NOTES
[facility]    Subjective:     Chief Complaint:   Chief Complaint   Patient presents with    Right Knee - Pain     RT knee inj  05/03/22 f/u., injection helped, had relief next day    Left Hip - Pain     Left hip x couple weeks, pain at groin, can barely stand from sittling       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    GERD (gastroesophageal reflux disease)     MVP (mitral valve prolapse)     RLS (restless legs syndrome)     Wears dentures     UPPER AND LOWER BRIDGE    Wears glasses        Past Surgical History:   Procedure Laterality Date    BACK SURGERY      nerve stimulator    COLONOSCOPY      HIP ARTHROPLASTY Right 6/25/2018    Procedure: ARTHROPLASTY, HIP;  Surgeon: Prosper Hodge MD;  Location: [facility] OR;  Service: Orthopedics;  Laterality: Right;  william    HYSTERECTOMY      implanted spinal cord stimulator      NONFUNCTIONING X 15 YEARS    SACRAL NERVE STIMULATOR PLACEMENT Left     SKIN CANCER EXCISION      THYROIDECTOMY N/A 3/9/2020    Procedure: TOTAL THYROIDECTOMY;  Surgeon: Kelsie Rodgers MD;  Location: [facility] OR;  Service: General;  Laterality: N/A;    TONSILLECTOMY         Current Outpatient Medications   Medication Sig    acyclovir (ZOVIRAX) 400 MG tablet acyclovir 400 mg tablet   TAKE 1 TABLET BY MOUTH TWICE DAILY    ergocalciferol (ERGOCALCIFEROL) 50,000 unit Cap Take 10,000 Units by mouth once daily.    esomeprazole magnesium (NEXIUM ORAL)     rOPINIRole (REQUIP) 1 MG tablet as needed.     sertraline (ZOLOFT) 100 MG tablet Take 1 tablet (100 mg total) by mouth once daily.    SYNTHROID 125 mcg tablet Take 112 mcg by mouth once daily.     Current Facility-Administered Medications   Medication    acetaminophen tablet 650 mg    albuterol inhaler 2 puff    diphenhydrAMINE injection 25 mg    EPINEPHrine (EPIPEN) 0.3 mg/0.3 mL pen injection 0.3 mg     methylPREDNISolone sodium succinate injection 40 mg    ondansetron injection 4 mg    sodium chloride 0.9% 500 mL flush bag    sodium chloride 0.9% flush 10 mL       Review of patient's allergies indicates:   Allergen Reactions    Morphine Swelling     NECK SWELLED    Demerol (pf) [meperidine (pf)] Other (See Comments)     HEART RACES, BOUNCES OFF WALL    Erythromycin Itching    Lidocaine Other (See Comments)       Family History   Problem Relation Age of Onset    Diabetes Father         late onset    Glaucoma Father     Diabetes Other         nephew    Colon cancer Neg Hx     Breast cancer Neg Hx     Colon polyps Neg Hx        Social History     Socioeconomic History    Marital status:     Number of children: 1   Tobacco Use    Smoking status: Former Smoker     Packs/day: 1.00     Years: 37.00     Pack years: 37.00     Quit date: 2009     Years since quittin.4    Smokeless tobacco: Never Used   Substance and Sexual Activity    Alcohol use: Yes     Comment: RARELY    Drug use: No    Sexual activity: Not Currently       History of present illness: Ms Castro comes in today for follow-up for her right knee injection from 6 weeks ago and a new complaint of left hip/groin pain that has been going on for several weeks.  She denies any injury or trauma to the hip.  She reports she got great relief of her right knee pain with the last injection.  She is actually requesting repeat injection to the knee.  She does have a prior history of a right total hip arthroplasty approximately 5 years ago with Dr. Hodge with a great result.  Also, she does have a implanted spinal stimulator.    Review of Systems:    Constitution: Negative for chills, fever, and sweats.  Negative for unexplained weight loss.    HENT:  Negative for headaches and blurry vision.    Cardiovascular:Negative for chest pain or irregular heart beat. Negative for hypertension.    Respiratory:  Negative for cough and shortness of  breath.    Gastrointestinal: Negative for abdominal pain, heartburn, melena, nausea, and vomitting.    Genitourinary:  Negative bladder incontinence and dysuria.    Musculoskeletal:  See HPI for details.     Neurological: Negative for numbness.    Psychiatric/Behavioral: Negative for depression.  The patient is not nervous/anxious.      Endocrine: Negative for polyuria    Hematologic/Lymphatic: Negative for bleeding problem.  Does not bruise/bleed easily.    Skin: Negative for poor would healing and rash    Objective:      Physical Examination:    Vital Signs:  There were no vitals filed for this visit.    Body mass index is 38.28 kg/m².    This a well-developed, well nourished patient in no acute distress.  They are alert and oriented and cooperative to examination.        Left hip exam:  Skin to left hip is clean dry and intact.  There is no erythema or ecchymosis.  There are no signs or symptoms of infection.  Patient is neurovascularly intact throughout the left lower extremity.  She is nontender to palpation over left greater trochanter.  She has well-preserved internal/external rotation of her left hip but it is painful for her.  She localizes pain to her groin.  She has a negative straight leg raise on the left.  She can weightbear as tolerated left lower extremity.  Left calf is soft and nontender.    Pertinent New Results:    XRAY Report / Interpretation:   Two views were taken of her left hip today:  AP and lateral views.  They reveal no acute fractures or dislocations.  Visualized soft tissues are unremarkable.  She does have moderate osteoarthritis in her left hip joint, most prominently inferiorly with the development of a small inferior femoral head osteophyte.    Assessment/Plan:      1.  Left hip osteoarthritis.  2.  Right knee osteoarthritis.    We will order an intra-articular left hip injection with Interventional Radiology.  I gave her an injection to her right knee today via an anterolateral  "approach with 40 mg of Kenalog and lidocaine.  She tolerated this well.  We will have her follow-up in 3 months to see how she is doing with these 2 different injections.  We had asked her to pay particular attention how much relief and how long the relief she receives in her left hip from this injection.  We will go long way in helping determine future treatment recommendations.  She may need a total hip arthroplasty at some point in the future.    Deni Hill, Physician Assistant, served in the capacity as a "scribe" for this patient encounter.  A "face-to-face" encounter occurred with Dr. Prosper Hodge on this date.  The treatment plan and medical decision-making is outlined above. Patient was seen and examined with a chaperone.       This note was created using Dragon voice recognition software that occasionally misinterpreted phrases or words.        "

## 2022-06-14 NOTE — PROCEDURES
Large Joint Aspiration/Injection: R knee    Date/Time: 6/14/2022 1:00 PM  Performed by: Prosper Hodge MD  Authorized by: Prosper Hodge MD     Consent Done?:  Yes (Verbal)  Indications:  Pain  Site marked: the procedure site was marked    Timeout: prior to procedure the correct patient, procedure, and site was verified    Prep: patient was prepped and draped in usual sterile fashion      Local anesthesia used?: Yes    Local anesthetic:  Lidocaine 1% without epinephrine  Ultrasonic Guidance for needle placement?: No    Location:  Knee  Site:  R knee  Medications:  40 mg triamcinolone acetonide 40 mg/mL  Patient tolerance:  Patient tolerated the procedure well with no immediate complications

## 2022-06-15 ENCOUNTER — TELEPHONE (OUTPATIENT)
Dept: RADIOLOGY | Facility: HOSPITAL | Age: 67
End: 2022-06-15

## 2022-06-15 NOTE — NURSING
Left hip injection scheduled @ [facility] on 6/28 @ 1pm with arrival @ 1230.  Pre-procedure instructions given and understanding verbalized.

## 2022-06-24 RX ORDER — TRIAMCINOLONE ACETONIDE 40 MG/ML
40 INJECTION, SUSPENSION INTRA-ARTICULAR; INTRAMUSCULAR
Status: SHIPPED | OUTPATIENT
Start: 2022-05-03

## 2022-06-24 NOTE — PROCEDURES
Large Joint Aspiration/Injection: L knee    Date/Time: 5/3/2022 1:00 PM  Performed by: Prosper Hodge MD  Authorized by: Prosper Hodge MD     Consent Done?:  Yes (Verbal)  Indications:  Pain  Site marked: the procedure site was marked    Timeout: prior to procedure the correct patient, procedure, and site was verified    Prep: patient was prepped and draped in usual sterile fashion      Local anesthesia used?: Yes    Local anesthetic:  Lidocaine 1% without epinephrine  Ultrasonic Guidance for needle placement?: No    Location:  Knee  Site:  L knee  Medications:  40 mg triamcinolone acetonide 40 mg/mL  Patient tolerance:  Patient tolerated the procedure well with no immediate complications

## 2022-06-30 ENCOUNTER — HOSPITAL ENCOUNTER (OUTPATIENT)
Dept: RADIOLOGY | Facility: HOSPITAL | Age: 67
Discharge: HOME | End: 2022-06-30
Attending: ORTHOPAEDIC SURGERY
Payer: MEDICARE

## 2022-06-30 DIAGNOSIS — M16.12 PRIMARY OSTEOARTHRITIS OF LEFT HIP: ICD-10-CM

## 2022-06-30 PROCEDURE — 77002 NEEDLE LOCALIZATION BY XRAY: CPT | Mod: TC

## 2022-06-30 PROCEDURE — 25500020 PHARM REV CODE 255: Performed by: ORTHOPAEDIC SURGERY

## 2022-06-30 PROCEDURE — 25000003 PHARM REV CODE 250: Performed by: ORTHOPAEDIC SURGERY

## 2022-06-30 RX ORDER — BUPIVACAINE HYDROCHLORIDE 2.5 MG/ML
25 INJECTION, SOLUTION EPIDURAL; INFILTRATION; INTRACAUDAL ONCE
Status: COMPLETED | OUTPATIENT
Start: 2022-06-30 | End: 2022-06-30

## 2022-06-30 RX ORDER — METHYLPREDNISOLONE ACETATE 40 MG/ML
40 INJECTION, SUSPENSION INTRA-ARTICULAR; INTRALESIONAL; INTRAMUSCULAR; SOFT TISSUE ONCE
Status: DISCONTINUED | OUTPATIENT
Start: 2022-06-30 | End: 2022-07-01 | Stop reason: HOSPADM

## 2022-06-30 RX ADMIN — IOHEXOL 3 ML: 300 INJECTION, SOLUTION INTRAVENOUS at 01:06

## 2022-06-30 RX ADMIN — BUPIVACAINE HYDROCHLORIDE 11 ML: 2.5 INJECTION, SOLUTION EPIDURAL; INFILTRATION; INTRACAUDAL; PERINEURAL at 02:06

## 2022-07-16 ENCOUNTER — LAB VISIT (OUTPATIENT)
Dept: PRIMARY CARE CLINIC | Facility: CLINIC | Age: 67
End: 2022-07-16
Payer: COMMERCIAL

## 2022-07-16 DIAGNOSIS — Z01.818 PRE-OP TESTING: ICD-10-CM

## 2022-07-16 PROCEDURE — U0005 INFEC AGEN DETEC AMPLI PROBE: HCPCS

## 2022-07-16 PROCEDURE — U0003 INFECTIOUS AGENT DETECTION BY NUCLEIC ACID (DNA OR RNA); SEVERE ACUTE RESPIRATORY SYNDROME CORONAVIRUS 2 (SARS-COV-2) (CORONAVIRUS DISEASE [COVID-19]), AMPLIFIED PROBE TECHNIQUE, MAKING USE OF HIGH THROUGHPUT TECHNOLOGIES AS DESCRIBED BY CMS-2020-01-R: HCPCS

## 2022-07-17 LAB — SARS-COV-2 RNA RESP QL NAA+PROBE: NOT DETECTED

## 2022-07-19 ENCOUNTER — HOSPITAL ENCOUNTER (OUTPATIENT)
Facility: HOSPITAL | Age: 67
Discharge: HOME | End: 2022-07-19
Attending: INTERNAL MEDICINE | Admitting: INTERNAL MEDICINE
Payer: COMMERCIAL

## 2022-07-19 ENCOUNTER — ANESTHESIA (OUTPATIENT)
Dept: ENDOSCOPY | Facility: HOSPITAL | Age: 67
End: 2022-07-19
Payer: MEDICARE

## 2022-07-19 ENCOUNTER — ANESTHESIA EVENT (OUTPATIENT)
Dept: ENDOSCOPY | Facility: HOSPITAL | Age: 67
End: 2022-07-19
Payer: MEDICARE

## 2022-07-19 VITALS
RESPIRATION RATE: 16 BRPM | BODY MASS INDEX: 33.32 KG/M2 | SYSTOLIC BLOOD PRESSURE: 125 MMHG | DIASTOLIC BLOOD PRESSURE: 67 MMHG | HEIGHT: 65 IN | WEIGHT: 200 LBS | TEMPERATURE: 98 F | HEART RATE: 56 BPM | OXYGEN SATURATION: 93 %

## 2022-07-19 DIAGNOSIS — K63.5 POLYP OF COLON, UNSPECIFIED PART OF COLON, UNSPECIFIED TYPE: Primary | ICD-10-CM

## 2022-07-19 DIAGNOSIS — K64.8 INTERNAL HEMORRHOIDS: ICD-10-CM

## 2022-07-19 DIAGNOSIS — R10.31 RLQ ABDOMINAL PAIN: ICD-10-CM

## 2022-07-19 PROCEDURE — D9220A PRA ANESTHESIA: ICD-10-PCS | Mod: ANES,,, | Performed by: ANESTHESIOLOGY

## 2022-07-19 PROCEDURE — 88305 TISSUE EXAM BY PATHOLOGIST: ICD-10-PCS | Mod: 26,,, | Performed by: STUDENT IN AN ORGANIZED HEALTH CARE EDUCATION/TRAINING PROGRAM

## 2022-07-19 PROCEDURE — 45385 COLONOSCOPY W/LESION REMOVAL: CPT | Mod: PT | Performed by: INTERNAL MEDICINE

## 2022-07-19 PROCEDURE — 27201012 HC FORCEPS, HOT/COLD, DISP: Performed by: INTERNAL MEDICINE

## 2022-07-19 PROCEDURE — 88305 TISSUE EXAM BY PATHOLOGIST: CPT | Mod: 26,,, | Performed by: STUDENT IN AN ORGANIZED HEALTH CARE EDUCATION/TRAINING PROGRAM

## 2022-07-19 PROCEDURE — D9220A PRA ANESTHESIA: ICD-10-PCS | Mod: CRNA,,, | Performed by: NURSE ANESTHETIST, CERTIFIED REGISTERED

## 2022-07-19 PROCEDURE — 45385 COLONOSCOPY W/LESION REMOVAL: CPT | Mod: ,,, | Performed by: INTERNAL MEDICINE

## 2022-07-19 PROCEDURE — 25000003 PHARM REV CODE 250: Performed by: INTERNAL MEDICINE

## 2022-07-19 PROCEDURE — 88305 TISSUE EXAM BY PATHOLOGIST: CPT | Mod: 59 | Performed by: STUDENT IN AN ORGANIZED HEALTH CARE EDUCATION/TRAINING PROGRAM

## 2022-07-19 PROCEDURE — D9220A PRA ANESTHESIA: Mod: CRNA,,, | Performed by: NURSE ANESTHETIST, CERTIFIED REGISTERED

## 2022-07-19 PROCEDURE — 45380 COLONOSCOPY AND BIOPSY: CPT | Mod: 59,,, | Performed by: INTERNAL MEDICINE

## 2022-07-19 PROCEDURE — 37000008 HC ANESTHESIA 1ST 15 MINUTES: Performed by: INTERNAL MEDICINE

## 2022-07-19 PROCEDURE — 27201089 HC SNARE, DISP (ANY): Performed by: INTERNAL MEDICINE

## 2022-07-19 PROCEDURE — 37000009 HC ANESTHESIA EA ADD 15 MINS: Performed by: INTERNAL MEDICINE

## 2022-07-19 PROCEDURE — 45385 PR COLONOSCOPY,REMV LESN,SNARE: ICD-10-PCS | Mod: ,,, | Performed by: INTERNAL MEDICINE

## 2022-07-19 PROCEDURE — 45380 COLONOSCOPY AND BIOPSY: CPT | Mod: PT,59 | Performed by: INTERNAL MEDICINE

## 2022-07-19 PROCEDURE — D9220A PRA ANESTHESIA: Mod: ANES,,, | Performed by: ANESTHESIOLOGY

## 2022-07-19 PROCEDURE — 63600175 PHARM REV CODE 636 W HCPCS: Performed by: NURSE ANESTHETIST, CERTIFIED REGISTERED

## 2022-07-19 PROCEDURE — 25000003 PHARM REV CODE 250: Performed by: NURSE ANESTHETIST, CERTIFIED REGISTERED

## 2022-07-19 PROCEDURE — 45380 PR COLONOSCOPY,BIOPSY: ICD-10-PCS | Mod: 59,,, | Performed by: INTERNAL MEDICINE

## 2022-07-19 RX ORDER — PROPOFOL 10 MG/ML
VIAL (ML) INTRAVENOUS
Status: DISCONTINUED | OUTPATIENT
Start: 2022-07-19 | End: 2022-07-19

## 2022-07-19 RX ORDER — LIDOCAINE HCL/PF 100 MG/5ML
SYRINGE (ML) INTRAVENOUS
Status: DISCONTINUED | OUTPATIENT
Start: 2022-07-19 | End: 2022-07-19

## 2022-07-19 RX ORDER — SODIUM CHLORIDE 9 MG/ML
INJECTION, SOLUTION INTRAVENOUS CONTINUOUS
Status: DISCONTINUED | OUTPATIENT
Start: 2022-07-19 | End: 2022-07-19 | Stop reason: HOSPADM

## 2022-07-19 RX ADMIN — PROPOFOL 50 MG: 10 INJECTION, EMULSION INTRAVENOUS at 10:07

## 2022-07-19 RX ADMIN — LIDOCAINE HYDROCHLORIDE 100 MG: 20 INJECTION INTRAVENOUS at 10:07

## 2022-07-19 RX ADMIN — SODIUM CHLORIDE: 0.9 INJECTION, SOLUTION INTRAVENOUS at 09:07

## 2022-07-19 RX ADMIN — PROPOFOL 100 MG: 10 INJECTION, EMULSION INTRAVENOUS at 10:07

## 2022-07-19 NOTE — PROVATION PATIENT INSTRUCTIONS
Discharge Summary/Instructions after an Endoscopic Procedure  Patient Name: Gloria Manuel  Patient MRN: [id number]  Patient YOB: 1955 Tuesday, July 19, 2022  Kuldeep Haile MD  Dear patient,  As a result of recent federal legislation (The Federal Cures Act), you may   receive lab or pathology results from your procedure in your [facility]   account before your physician is able to contact you. Your physician or   their representative will relay the results to you with their   recommendations at their soonest availability.  Thank you,  RESTRICTIONS:  During your procedure today, you received medications for sedation.  These   medications may affect your judgment, balance and coordination.  Therefore,   for 24 hours, you have the following restrictions:   - DO NOT drive a car, operate machinery, make legal/financial decisions,   sign important papers or drink alcohol.    ACTIVITY:  Today: no heavy lifting, straining or running due to procedural   sedation/anesthesia.  The following day: return to full activity including work.  DIET:  Eat and drink normally unless instructed otherwise.     TREATMENT FOR COMMON SIDE EFFECTS:  - Mild abdominal pain, nausea, belching, bloating or excessive gas:  rest,   eat lightly and use a heating pad.  - Sore Throat: treat with throat lozenges and/or gargle with warm salt   water.  - Because air was used during the procedure, expelling large amounts of air   from your rectum or belching is normal.  - If a bowel prep was taken, you may not have a bowel movement for 1-3 days.    This is normal.  SYMPTOMS TO WATCH FOR AND REPORT TO YOUR PHYSICIAN:  1. Abdominal pain or bloating, other than gas cramps.  2. Chest pain.  3. Back pain.  4. Signs of infection such as: chills or fever occurring within 24 hours   after the procedure.  5. Rectal bleeding, which would show as bright red, maroon, or black stools.   (A tablespoon of blood from the rectum is not serious, especially if    hemorrhoids are present.)  6. Vomiting.  7. Weakness or dizziness.  GO DIRECTLY TO THE NEAREST EMERGENCY ROOM IF YOU HAVE ANY OF THE FOLLOWING:      Difficulty breathing              Chills and/or fever over 101 F   Persistent vomiting and/or vomiting blood   Severe abdominal pain   Severe chest pain   Black, tarry stools   Bleeding- more than one tablespoon   Any other symptom or condition that you feel may need urgent attention  Your doctor recommends these additional instructions:  If any biopsies were taken, your doctors clinic will contact you in 1 to 2   weeks with any results.  - Patient has a contact number available for emergencies.  The signs and   symptoms of potential delayed complications were discussed with the   patient.  Return to normal activities tomorrow.  Written discharge   instructions were provided to the patient.   - High fiber diet.   - Continue present medications.   - Await pathology results.   - Repeat colonoscopy in 3 years for surveillance.   - Discharge patient to home (ambulatory).   - Return to my office PRN.  For questions, problems or results please call your physician - Kuldeep Haile MD at Work:  [phone].  [facility], EMERGENCY ROOM PHONE NUMBER: [phone]  IF A COMPLICATION OR EMERGENCY SITUATION ARISES AND YOU ARE UNABLE TO REACH   YOUR PHYSICIAN - GO DIRECTLY TO THE EMERGENCY ROOM.  Kuldeep Haile MD  7/19/2022 10:43:58 AM  This report has been verified and signed electronically.  Dear patient,  As a result of recent federal legislation (The Federal Cures Act), you may   receive lab or pathology results from your procedure in your [facility]   account before your physician is able to contact you. Your physician or   their representative will relay the results to you with their   recommendations at their soonest availability.  Thank you,  PROVATION

## 2022-07-19 NOTE — H&P
CC: Abdominal pain, change in bowel habits    66 year old female with above. States that symptoms are stable, no alleviating/exacerbating factors. No family history of colorectal CA. No personal history of polyps. No bleeding or weight loss.     ROS:  No headache, no fever/chills, no chest pain/SOB, no nausea/vomiting/diarrhea/constipation/GI bleeding/abdominal pain, no dysuria/hematuria.    VSSAF   Exam:   Alert and oriented x 3; no apparent distress   PERRLA, sclera anicteric  CV: Regular rate/rhythm, normal PMI   Lungs: Clear bilaterally with no wheeze/rales   Abdomen: Soft, NT/ND, normal bowel sounds   Ext: No cyanosis, clubbing     Impression:   As above    Plan:   Proceed with endoscopy. Further recs to follow.

## 2022-07-19 NOTE — TRANSFER OF CARE
"Anesthesia Transfer of Care Note    Patient: Gloria Manuel    Procedure(s) Performed: Procedure(s) (LRB):  COLONOSCOPY (N/A)    Patient location: GI    Anesthesia Type: general    Transport from OR: Transported from OR on room air with adequate spontaneous ventilation    Post pain: adequate analgesia    Post assessment: no apparent anesthetic complications and tolerated procedure well    Post vital signs: stable    Level of consciousness: awake, alert and oriented    Nausea/Vomiting: no nausea/vomiting    Complications: none    Transfer of care protocol was followed      Last vitals:   Visit Vitals  /67   Pulse 68   Temp 36.7 °C (98.1 °F) (Skin)   Resp 16   Ht 5' 5" (1.651 m)   Wt 90.7 kg (200 lb)   SpO2 96%   Breastfeeding No   BMI 33.28 kg/m²     "

## 2022-07-19 NOTE — ANESTHESIA POSTPROCEDURE EVALUATION
Anesthesia Post Evaluation    Patient: Gloria Manuel    Procedure(s) Performed: Procedure(s) (LRB):  COLONOSCOPY (N/A)    Final Anesthesia Type: general      Patient location during evaluation: PACU  Patient participation: Yes- Able to Participate  Level of consciousness: awake and alert and oriented  Post-procedure vital signs: reviewed and stable  Pain management: adequate  Airway patency: patent    PONV status at discharge: No PONV  Anesthetic complications: no      Cardiovascular status: blood pressure returned to baseline  Respiratory status: unassisted, spontaneous ventilation and room air  Hydration status: euvolemic  Follow-up not needed.          Vitals Value Taken Time   /68 07/19/22 1047   Temp 36.7 °C (98 °F) 07/19/22 1047   Pulse 70 07/19/22 1048   Resp 16 07/19/22 1047   SpO2 94 % 07/19/22 1048   Vitals shown include unvalidated device data.      No case tracking events are documented in the log.      Pain/Ray Score: Ray Score: 10 (7/19/2022 10:45 AM)

## 2022-07-25 LAB
FINAL PATHOLOGIC DIAGNOSIS: NORMAL
GROSS: NORMAL
Lab: NORMAL
MICROSCOPIC EXAM: NORMAL

## 2022-07-26 NOTE — PROGRESS NOTES
Please advise patient that polyp pathology was reviewed and is benign and is the adenomatous type which is precancerous/risk factor for cancer.  No evidence of colitis on biopsies.  Repeat colonoscopy recommended in 3 years.  Place reminder in system for repeat colonoscopy.

## 2022-08-03 ENCOUNTER — PATIENT MESSAGE (OUTPATIENT)
Dept: FAMILY MEDICINE | Facility: CLINIC | Age: 67
End: 2022-08-03
Payer: MEDICARE

## 2022-08-03 DIAGNOSIS — F33.1 MODERATE EPISODE OF RECURRENT MAJOR DEPRESSIVE DISORDER: ICD-10-CM

## 2022-08-03 RX ORDER — SERTRALINE HYDROCHLORIDE 100 MG/1
100 TABLET, FILM COATED ORAL DAILY
Qty: 30 TABLET | Refills: 5 | Status: SHIPPED | OUTPATIENT
Start: 2022-08-03 | End: 2023-02-03 | Stop reason: SDUPTHER

## 2022-08-06 ENCOUNTER — PATIENT MESSAGE (OUTPATIENT)
Dept: ORTHOPEDICS | Facility: CLINIC | Age: 67
End: 2022-08-06

## 2022-08-06 DIAGNOSIS — M16.12 PRIMARY OSTEOARTHRITIS OF LEFT HIP: Primary | ICD-10-CM

## 2022-08-08 ENCOUNTER — TELEPHONE (OUTPATIENT)
Dept: RADIOLOGY | Facility: HOSPITAL | Age: 67
End: 2022-08-08

## 2022-08-24 ENCOUNTER — OFFICE VISIT (OUTPATIENT)
Dept: URGENT CARE | Facility: CLINIC | Age: 67
End: 2022-08-24
Payer: MEDICARE

## 2022-08-24 VITALS
RESPIRATION RATE: 16 BRPM | WEIGHT: 200 LBS | SYSTOLIC BLOOD PRESSURE: 112 MMHG | OXYGEN SATURATION: 95 % | DIASTOLIC BLOOD PRESSURE: 65 MMHG | BODY MASS INDEX: 33.28 KG/M2 | HEART RATE: 78 BPM | TEMPERATURE: 98 F

## 2022-08-24 DIAGNOSIS — M62.830 MUSCLE SPASM OF BACK: ICD-10-CM

## 2022-08-24 DIAGNOSIS — R31.9 URINARY TRACT INFECTION WITH HEMATURIA, SITE UNSPECIFIED: ICD-10-CM

## 2022-08-24 DIAGNOSIS — N39.0 URINARY TRACT INFECTION WITH HEMATURIA, SITE UNSPECIFIED: ICD-10-CM

## 2022-08-24 DIAGNOSIS — M54.50 ACUTE BILATERAL LOW BACK PAIN WITHOUT SCIATICA: Primary | ICD-10-CM

## 2022-08-24 LAB
BILIRUB SERPL-MCNC: NEGATIVE MG/DL
BLOOD, POC UA: NORMAL
GLUCOSE UR QL STRIP: NEGATIVE
KETONES UR QL STRIP: NEGATIVE
LEUKOCYTE ESTERASE URINE, POC: NORMAL
NITRITE, POC UA: POSITIVE
PH, POC UA: 5
PROTEIN, POC: NEGATIVE
SPECIFIC GRAVITY, POC UA: 1.03
UROBILINOGEN, POC UA: NORMAL

## 2022-08-24 PROCEDURE — 96372 PR INJECTION,THERAP/PROPH/DIAG2ST, IM OR SUBCUT: ICD-10-PCS | Mod: S$GLB,,, | Performed by: EMERGENCY MEDICINE

## 2022-08-24 PROCEDURE — 81003 POCT URINALYSIS: ICD-10-PCS | Mod: QW,S$GLB,, | Performed by: NURSE PRACTITIONER

## 2022-08-24 PROCEDURE — 99213 OFFICE O/P EST LOW 20 MIN: CPT | Mod: 25,S$GLB,, | Performed by: NURSE PRACTITIONER

## 2022-08-24 PROCEDURE — 99213 PR OFFICE/OUTPT VISIT, EST, LEVL III, 20-29 MIN: ICD-10-PCS | Mod: 25,S$GLB,, | Performed by: NURSE PRACTITIONER

## 2022-08-24 PROCEDURE — 96372 THER/PROPH/DIAG INJ SC/IM: CPT | Mod: S$GLB,,, | Performed by: EMERGENCY MEDICINE

## 2022-08-24 PROCEDURE — 81003 URINALYSIS AUTO W/O SCOPE: CPT | Mod: QW,S$GLB,, | Performed by: NURSE PRACTITIONER

## 2022-08-24 RX ORDER — KETOROLAC TROMETHAMINE 30 MG/ML
60 INJECTION, SOLUTION INTRAMUSCULAR; INTRAVENOUS
Status: COMPLETED | OUTPATIENT
Start: 2022-08-24 | End: 2022-08-24

## 2022-08-24 RX ORDER — TIZANIDINE 2 MG/1
4 TABLET ORAL EVERY 8 HOURS PRN
Qty: 28 TABLET | Refills: 0 | Status: SHIPPED | OUTPATIENT
Start: 2022-08-24 | End: 2022-09-03

## 2022-08-24 RX ORDER — NITROFURANTOIN 25; 75 MG/1; MG/1
100 CAPSULE ORAL 2 TIMES DAILY
Qty: 14 CAPSULE | Refills: 0 | Status: SHIPPED | OUTPATIENT
Start: 2022-08-24 | End: 2022-08-31

## 2022-08-24 RX ORDER — KETOROLAC TROMETHAMINE 10 MG/1
10 TABLET, FILM COATED ORAL EVERY 8 HOURS PRN
Qty: 20 TABLET | Refills: 0 | Status: SHIPPED | OUTPATIENT
Start: 2022-08-24 | End: 2022-08-29

## 2022-08-24 RX ADMIN — KETOROLAC TROMETHAMINE 60 MG: 30 INJECTION, SOLUTION INTRAMUSCULAR; INTRAVENOUS at 02:08

## 2022-08-24 NOTE — PROGRESS NOTES
Subjective:       Patient ID: Gloria Manuel is a 66 y.o. female.    Vitals:  weight is 90.7 kg (200 lb). Her temperature is 97.9 °F (36.6 °C). Her blood pressure is 112/65 and her pulse is 78. Her respiration is 16 and oxygen saturation is 95%.     Chief Complaint: Back Pain    Gloria Manuel presents to clinic with complaint of lower back pain that stretches across the entire lower back.  Patient does report that she has a chronic history of lower back and bilateral hip pain.  Patient denies any dysuria, urine frequency or, urine urgency but states that she is concerned that this may also be a UTI.  Patient denies any fever, chills, nausea vomiting or diarrhea at this time.  Pt is complaining of right sided flank pain more than lumbar spine pain.    Back Pain  This is a new problem. The current episode started yesterday. The problem occurs constantly. The problem is unchanged. The pain is present in the lumbar spine. The quality of the pain is described as aching and shooting. The pain does not radiate. The pain is the same all the time. The symptoms are aggravated by bending and standing. Stiffness is present all day. She has tried nothing for the symptoms.       Constitution: Negative.   HENT: Negative.    Cardiovascular: Negative.    Eyes: Negative.    Respiratory: Negative.    Gastrointestinal: Negative.    Endocrine: negative.   Genitourinary: Negative.    Musculoskeletal: Positive for back pain.   Hematologic/Lymphatic: Negative.        Objective:      Physical Exam   Constitutional: She is oriented to person, place, and time. She appears well-developed. She is cooperative.  Non-toxic appearance. She does not appear ill. No distress.   HENT:   Head: Normocephalic and atraumatic.   Ears:   Right Ear: Hearing, tympanic membrane, external ear and ear canal normal.   Left Ear: Hearing, tympanic membrane, external ear and ear canal normal.   Nose: Nose normal. No mucosal edema, rhinorrhea or  nasal deformity. No epistaxis. Right sinus exhibits no maxillary sinus tenderness and no frontal sinus tenderness. Left sinus exhibits no maxillary sinus tenderness and no frontal sinus tenderness.   Mouth/Throat: Uvula is midline, oropharynx is clear and moist and mucous membranes are normal. No trismus in the jaw. Normal dentition. No uvula swelling. No oropharyngeal exudate, posterior oropharyngeal edema or posterior oropharyngeal erythema.   Eyes: Conjunctivae and lids are normal. No scleral icterus.   Neck: Trachea normal and phonation normal. Neck supple. No edema present. No erythema present. No neck rigidity present.   Cardiovascular: Normal rate, regular rhythm, normal heart sounds and normal pulses.   Pulmonary/Chest: Effort normal and breath sounds normal. No respiratory distress. She has no decreased breath sounds. She has no rhonchi.   Abdominal: Normal appearance.   Musculoskeletal: Normal range of motion.         General: No deformity. Normal range of motion.      Lumbar back: She exhibits tenderness and spasm.   Neurological: She is alert and oriented to person, place, and time. She exhibits normal muscle tone. Coordination normal.   Skin: Skin is warm, dry, intact, not diaphoretic and not pale.   Psychiatric: Her speech is normal and behavior is normal. Judgment and thought content normal.   Nursing note and vitals reviewed.        Assessment:       1. Acute bilateral low back pain without sciatica    2. Muscle spasm of back    3. Urinary tract infection with hematuria, site unspecified          Plan:         Acute bilateral low back pain without sciatica  -     POCT URINALYSIS  -     ketorolac injection 60 mg  -     ketorolac (TORADOL) 10 mg tablet; Take 1 tablet (10 mg total) by mouth every 8 (eight) hours as needed for Pain.  Dispense: 20 tablet; Refill: 0  -     tiZANidine (ZANAFLEX) 2 MG tablet; Take 2 tablets (4 mg total) by mouth every 8 (eight) hours as needed (spasms).  Dispense: 28 tablet;  Refill: 0  -     POCT Urinalysis, Dipstick, Automated, W/O Scope    Muscle spasm of back  -     ketorolac injection 60 mg  -     ketorolac (TORADOL) 10 mg tablet; Take 1 tablet (10 mg total) by mouth every 8 (eight) hours as needed for Pain.  Dispense: 20 tablet; Refill: 0  -     tiZANidine (ZANAFLEX) 2 MG tablet; Take 2 tablets (4 mg total) by mouth every 8 (eight) hours as needed (spasms).  Dispense: 28 tablet; Refill: 0  -     POCT Urinalysis, Dipstick, Automated, W/O Scope    Urinary tract infection with hematuria, site unspecified  -     nitrofurantoin, macrocrystal-monohydrate, (MACROBID) 100 MG capsule; Take 1 capsule (100 mg total) by mouth 2 (two) times daily. for 7 days  Dispense: 14 capsule; Refill: 0         Pt is getting a steroid injection next month so will avoid today

## 2022-08-31 DIAGNOSIS — Z12.31 OTHER SCREENING MAMMOGRAM: ICD-10-CM

## 2022-09-06 ENCOUNTER — PATIENT MESSAGE (OUTPATIENT)
Dept: ADMINISTRATIVE | Facility: HOSPITAL | Age: 67
End: 2022-09-06
Payer: MEDICARE

## 2022-09-08 ENCOUNTER — HOSPITAL ENCOUNTER (OUTPATIENT)
Dept: RADIOLOGY | Facility: HOSPITAL | Age: 67
Discharge: HOME | End: 2022-09-08
Attending: STUDENT IN AN ORGANIZED HEALTH CARE EDUCATION/TRAINING PROGRAM
Payer: COMMERCIAL

## 2022-09-08 DIAGNOSIS — Z12.31 OTHER SCREENING MAMMOGRAM: ICD-10-CM

## 2022-09-08 PROCEDURE — 77063 BREAST TOMOSYNTHESIS BI: CPT | Mod: TC

## 2022-09-08 PROCEDURE — 77067 MAMMO DIGITAL SCREENING BILAT WITH TOMO: ICD-10-PCS | Mod: 26,,, | Performed by: RADIOLOGY

## 2022-09-08 PROCEDURE — 77063 MAMMO DIGITAL SCREENING BILAT WITH TOMO: ICD-10-PCS | Mod: 26,,, | Performed by: RADIOLOGY

## 2022-09-08 PROCEDURE — 77067 SCR MAMMO BI INCL CAD: CPT | Mod: TC

## 2022-09-08 PROCEDURE — 77067 SCR MAMMO BI INCL CAD: CPT | Mod: 26,,, | Performed by: RADIOLOGY

## 2022-09-08 PROCEDURE — 77063 BREAST TOMOSYNTHESIS BI: CPT | Mod: 26,,, | Performed by: RADIOLOGY

## 2022-09-12 ENCOUNTER — HOSPITAL ENCOUNTER (OUTPATIENT)
Dept: RADIOLOGY | Facility: HOSPITAL | Age: 67
Discharge: HOME | End: 2022-09-12
Attending: PHYSICIAN ASSISTANT
Payer: MEDICARE

## 2022-09-12 DIAGNOSIS — M16.12 PRIMARY OSTEOARTHRITIS OF LEFT HIP: ICD-10-CM

## 2022-09-12 PROCEDURE — 25000003 PHARM REV CODE 250: Performed by: PHYSICIAN ASSISTANT

## 2022-09-12 PROCEDURE — 25500020 PHARM REV CODE 255: Performed by: PHYSICIAN ASSISTANT

## 2022-09-12 PROCEDURE — 77002 NEEDLE LOCALIZATION BY XRAY: CPT | Mod: TC

## 2022-09-12 RX ORDER — BUPIVACAINE HYDROCHLORIDE 2.5 MG/ML
30 INJECTION, SOLUTION EPIDURAL; INFILTRATION; INTRACAUDAL ONCE
Status: COMPLETED | OUTPATIENT
Start: 2022-09-12 | End: 2022-09-12

## 2022-09-12 RX ORDER — METHYLPREDNISOLONE ACETATE 40 MG/ML
40 INJECTION, SUSPENSION INTRA-ARTICULAR; INTRALESIONAL; INTRAMUSCULAR; SOFT TISSUE ONCE
Status: DISCONTINUED | OUTPATIENT
Start: 2022-09-12 | End: 2022-09-13 | Stop reason: HOSPADM

## 2022-09-12 RX ADMIN — BUPIVACAINE HYDROCHLORIDE 13 ML: 2.5 INJECTION, SOLUTION EPIDURAL; INFILTRATION; INTRACAUDAL; PERINEURAL at 02:09

## 2022-09-12 RX ADMIN — IOHEXOL 3 ML: 300 INJECTION, SOLUTION INTRAVENOUS at 01:09

## 2022-09-15 ENCOUNTER — OFFICE VISIT (OUTPATIENT)
Dept: ORTHOPEDICS | Facility: CLINIC | Age: 67
End: 2022-09-15
Payer: MEDICARE

## 2022-09-15 VITALS
SYSTOLIC BLOOD PRESSURE: 116 MMHG | WEIGHT: 205 LBS | BODY MASS INDEX: 34.16 KG/M2 | HEIGHT: 65 IN | DIASTOLIC BLOOD PRESSURE: 70 MMHG

## 2022-09-15 DIAGNOSIS — M22.41 CHONDROMALACIA OF RIGHT PATELLA: ICD-10-CM

## 2022-09-15 DIAGNOSIS — M75.41 IMPINGEMENT SYNDROME OF RIGHT SHOULDER: Primary | ICD-10-CM

## 2022-09-15 DIAGNOSIS — M75.51 BURSITIS OF RIGHT SHOULDER: ICD-10-CM

## 2022-09-15 DIAGNOSIS — M17.11 PRIMARY OSTEOARTHRITIS OF RIGHT KNEE: ICD-10-CM

## 2022-09-15 PROCEDURE — 99213 OFFICE O/P EST LOW 20 MIN: CPT | Mod: 25,S$GLB,, | Performed by: ORTHOPAEDIC SURGERY

## 2022-09-15 PROCEDURE — 20610 DRAIN/INJ JOINT/BURSA W/O US: CPT | Mod: RT,S$GLB,, | Performed by: ORTHOPAEDIC SURGERY

## 2022-09-15 PROCEDURE — 20610 LARGE JOINT ASPIRATION/INJECTION: R KNEE: ICD-10-PCS | Mod: RT,S$GLB,, | Performed by: ORTHOPAEDIC SURGERY

## 2022-09-15 PROCEDURE — 99213 PR OFFICE/OUTPT VISIT, EST, LEVL III, 20-29 MIN: ICD-10-PCS | Mod: 25,S$GLB,, | Performed by: ORTHOPAEDIC SURGERY

## 2022-09-15 RX ORDER — TRIAMCINOLONE ACETONIDE 40 MG/ML
40 INJECTION, SUSPENSION INTRA-ARTICULAR; INTRAMUSCULAR
Status: DISCONTINUED | OUTPATIENT
Start: 2022-09-15 | End: 2022-09-15 | Stop reason: HOSPADM

## 2022-09-15 RX ADMIN — TRIAMCINOLONE ACETONIDE 40 MG: 40 INJECTION, SUSPENSION INTRA-ARTICULAR; INTRAMUSCULAR at 12:09

## 2022-09-15 NOTE — PROGRESS NOTES
[facility]    Subjective:     Chief Complaint:   Chief Complaint   Patient presents with    Right Knee - Pain     Injected right knee on 6/12/22, did help a lot, would like another one today    Left Hip - Pain     Had Intraarticular Left Hip injection done on 9/12/22, did help alot    Right Shoulder - Pain     Right shoulder is a new complaint x 2 weeks, limited and painful ROM, unable to reach back       Past Medical History:   Diagnosis Date    Anxiety     Arthritis     Back pain     Cancer     Depression     Femoral acetabular impingement 11/6/2017    GERD (gastroesophageal reflux disease)     MVP (mitral valve prolapse)     RLS (restless legs syndrome)     Wears dentures     UPPER AND LOWER BRIDGE    Wears glasses        Past Surgical History:   Procedure Laterality Date    BACK SURGERY      nerve stimulator    COLONOSCOPY      COLONOSCOPY N/A 7/19/2022    Procedure: COLONOSCOPY;  Surgeon: Kuldeep Gill MD;  Location: [facility] ENDO;  Service: Endoscopy;  Laterality: N/A;    HIP ARTHROPLASTY Right 6/25/2018    Procedure: ARTHROPLASTY, HIP;  Surgeon: Prosper Hodge MD;  Location: [facility] OR;  Service: Orthopedics;  Laterality: Right;  william    HYSTERECTOMY      implanted spinal cord stimulator      NONFUNCTIONING X 15 YEARS    SACRAL NERVE STIMULATOR PLACEMENT Left     SKIN CANCER EXCISION      THYROIDECTOMY N/A 3/9/2020    Procedure: TOTAL THYROIDECTOMY;  Surgeon: Kelsie Rodgers MD;  Location: [facility] OR;  Service: General;  Laterality: N/A;    TONSILLECTOMY         Current Outpatient Medications   Medication Sig    acyclovir (ZOVIRAX) 400 MG tablet acyclovir 400 mg tablet   TAKE 1 TABLET BY MOUTH TWICE DAILY    ergocalciferol (ERGOCALCIFEROL) 50,000 unit Cap Take 10,000 Units by mouth once daily.    esomeprazole magnesium (NEXIUM ORAL)     rOPINIRole (REQUIP) 1 MG tablet as needed.     sertraline (ZOLOFT) 100 MG tablet Take 1 tablet (100 mg total) by mouth once daily.    SYNTHROID 125 mcg tablet Take 112 mcg  by mouth once daily.     Current Facility-Administered Medications   Medication    acetaminophen tablet 650 mg    diphenhydrAMINE injection 25 mg    methylPREDNISolone sodium succinate injection 40 mg    ondansetron injection 4 mg    sodium chloride 0.9% 500 mL flush bag    sodium chloride 0.9% flush 10 mL     Facility-Administered Medications Ordered in Other Visits   Medication    triamcinolone acetonide injection 40 mg       Review of patient's allergies indicates:   Allergen Reactions    Morphine Swelling     NECK SWELLED    Demerol (pf) [meperidine (pf)] Other (See Comments)     HEART RACES, BOUNCES OFF WALL    Erythromycin Itching       Family History   Problem Relation Age of Onset    Diabetes Father         late onset    Glaucoma Father     Diabetes Other         nephew    Colon cancer Neg Hx     Breast cancer Neg Hx     Colon polyps Neg Hx        Social History     Socioeconomic History    Marital status:     Number of children: 1   Tobacco Use    Smoking status: Former     Packs/day: 1.00     Years: 37.00     Pack years: 37.00     Types: Cigarettes     Quit date: 2009     Years since quittin.6    Smokeless tobacco: Never   Substance and Sexual Activity    Alcohol use: Yes     Comment: RARELY    Drug use: No    Sexual activity: Not Currently       History of present illness: Patient comes in today for the right knee and the right shoulder.  The knee is bothering her although she is somewhat better.  The shoulder is really bothering her.  She is having difficulty sleeping and difficulty with any overhead activity        Review of Systems:    Constitution: Negative for chills, fever, and sweats.  Negative for unexplained weight loss.    HENT:  Negative for headaches and blurry vision.    Cardiovascular:Negative for chest pain or irregular heart beat. Negative for hypertension.    Respiratory:  Negative for cough and shortness of breath.    Gastrointestinal: Negative for abdominal pain,  heartburn, melena, nausea, and vomitting.    Genitourinary:  Negative bladder incontinence and dysuria.    Musculoskeletal:  See HPI for details.     Neurological: Negative for numbness.    Psychiatric/Behavioral: Negative for depression.  The patient is not nervous/anxious.      Endocrine: Negative for polyuria    Hematologic/Lymphatic: Negative for bleeding problem.  Does not bruise/bleed easily.    Skin: Negative for poor would healing and rash    Objective:      Physical Examination:    Vital Signs:    Vitals:    09/15/22 1244   BP: 116/70       Body mass index is 34.11 kg/m².    This a well-developed, well nourished patient in no acute distress.  They are alert and oriented and cooperative to examination.        Patient has full range of motion right shoulder.  Positive impingement.  Positive crossover.  The rotator cuff is intact but very tender Spurling sign is negative .  Full range of motion bilateral knees.  Crepitus on the right trace effusion on the right  Pertinent New Results:    XRAY Report / Interpretation:   AP and lateral of the right shoulder demonstrates normal bony anatomy    Assessment/Plan:      Osteoarthritis/chondromalacia patella.  I injected the right knee with Kenalog and lidocaine.  Subacromial impingement right shoulder I injected the right subacromial space with Kenalog and lidocaine.  Follow-up in 1 month      This note was created using Dragon voice recognition software that occasionally misinterpreted phrases or words.

## 2022-09-15 NOTE — PROCEDURES
Large Joint Aspiration/Injection: R knee    Date/Time: 9/15/2022 12:45 PM  Performed by: Prosper Hodge MD  Authorized by: Prosper Hodge MD     Consent Done?:  Yes (Verbal)  Site marked: the procedure site was marked    Timeout: prior to procedure the correct patient, procedure, and site was verified    Prep: patient was prepped and draped in usual sterile fashion      Local anesthesia used?: Yes    Local anesthetic:  Lidocaine 1% without epinephrine    Details:  Needle Size:  25 G  Ultrasonic Guidance for needle placement?: No    Location:  Knee  Site:  R knee  Medications:  40 mg triamcinolone acetonide 40 mg/mL  Patient tolerance:  Patient tolerated the procedure well with no immediate complications

## 2022-09-15 NOTE — PROCEDURES
Large Joint Aspiration/Injection: R subacromial bursa    Date/Time: 9/15/2022 12:45 PM  Performed by: Prosper Hodge MD  Authorized by: Prosper Hodge MD     Consent Done?:  Yes (Verbal)  Indications:  Arthritis and pain  Site marked: the procedure site was marked    Timeout: prior to procedure the correct patient, procedure, and site was verified    Prep: patient was prepped and draped in usual sterile fashion      Local anesthesia used?: Yes    Local anesthetic:  Lidocaine 1% without epinephrine  Ultrasonic Guidance for needle placement?: No    Location:  Shoulder  Site:  R subacromial bursa  Medications:  40 mg triamcinolone acetonide 40 mg/mL  Patient tolerance:  Patient tolerated the procedure well with no immediate complications

## 2022-10-06 ENCOUNTER — OFFICE VISIT (OUTPATIENT)
Dept: ORTHOPEDICS | Facility: CLINIC | Age: 67
End: 2022-10-06
Payer: MEDICARE

## 2022-10-06 VITALS
SYSTOLIC BLOOD PRESSURE: 118 MMHG | BODY MASS INDEX: 34.16 KG/M2 | WEIGHT: 205 LBS | HEIGHT: 65 IN | DIASTOLIC BLOOD PRESSURE: 72 MMHG

## 2022-10-06 DIAGNOSIS — M16.12 PRIMARY OSTEOARTHRITIS OF LEFT HIP: Primary | ICD-10-CM

## 2022-10-06 DIAGNOSIS — M87.00 AVN (AVASCULAR NECROSIS OF BONE): ICD-10-CM

## 2022-10-06 PROCEDURE — 99213 OFFICE O/P EST LOW 20 MIN: CPT | Mod: S$GLB,,, | Performed by: ORTHOPAEDIC SURGERY

## 2022-10-06 PROCEDURE — 99213 PR OFFICE/OUTPT VISIT, EST, LEVL III, 20-29 MIN: ICD-10-PCS | Mod: S$GLB,,, | Performed by: ORTHOPAEDIC SURGERY

## 2022-10-06 NOTE — PROGRESS NOTES
[facility]    Subjective:     Chief Complaint:   Chief Complaint   Patient presents with    Left Hip - Pain     LT hip pain, states she had an intraarticular injection in September that only gave about 10 days of relief. Positive for groin pain       Past Medical History:   Diagnosis Date    Anxiety     Arthritis     Back pain     Cancer     Depression     Femoral acetabular impingement 11/6/2017    GERD (gastroesophageal reflux disease)     MVP (mitral valve prolapse)     RLS (restless legs syndrome)     Wears dentures     UPPER AND LOWER BRIDGE    Wears glasses        Past Surgical History:   Procedure Laterality Date    BACK SURGERY      nerve stimulator    COLONOSCOPY      COLONOSCOPY N/A 7/19/2022    Procedure: COLONOSCOPY;  Surgeon: Kuldeep Gill MD;  Location: [facility];  Service: Endoscopy;  Laterality: N/A;    HIP ARTHROPLASTY Right 6/25/2018    Procedure: ARTHROPLASTY, HIP;  Surgeon: Prosper Hodge MD;  Location: [facility] OR;  Service: Orthopedics;  Laterality: Right;  william    HYSTERECTOMY      implanted spinal cord stimulator      NONFUNCTIONING X 15 YEARS    SACRAL NERVE STIMULATOR PLACEMENT Left     SKIN CANCER EXCISION      THYROIDECTOMY N/A 3/9/2020    Procedure: TOTAL THYROIDECTOMY;  Surgeon: Kelsie Rodgers MD;  Location: [facility] OR;  Service: General;  Laterality: N/A;    TONSILLECTOMY         Current Outpatient Medications   Medication Sig    acyclovir (ZOVIRAX) 400 MG tablet acyclovir 400 mg tablet   TAKE 1 TABLET BY MOUTH TWICE DAILY    ergocalciferol (ERGOCALCIFEROL) 50,000 unit Cap Take 10,000 Units by mouth once daily.    esomeprazole magnesium (NEXIUM ORAL)     rOPINIRole (REQUIP) 1 MG tablet as needed.     sertraline (ZOLOFT) 100 MG tablet Take 1 tablet (100 mg total) by mouth once daily.    SYNTHROID 125 mcg tablet Take 112 mcg by mouth once daily.     Current Facility-Administered Medications   Medication    acetaminophen tablet 650 mg    diphenhydrAMINE injection 25 mg     methylPREDNISolone sodium succinate injection 40 mg    ondansetron injection 4 mg    sodium chloride 0.9% 500 mL flush bag    sodium chloride 0.9% flush 10 mL     Facility-Administered Medications Ordered in Other Visits   Medication    triamcinolone acetonide injection 40 mg       Review of patient's allergies indicates:   Allergen Reactions    Morphine Swelling     NECK SWELLED    Demerol (pf) [meperidine (pf)] Other (See Comments)     HEART RACES, BOUNCES OFF WALL    Erythromycin Itching       Family History   Problem Relation Age of Onset    Diabetes Father         late onset    Glaucoma Father     Diabetes Other         nephew    Colon cancer Neg Hx     Breast cancer Neg Hx     Colon polyps Neg Hx        Social History     Socioeconomic History    Marital status:     Number of children: 1   Tobacco Use    Smoking status: Former     Packs/day: 1.00     Years: 37.00     Pack years: 37.00     Types: Cigarettes     Quit date: 2009     Years since quittin.7    Smokeless tobacco: Never   Substance and Sexual Activity    Alcohol use: Yes     Comment: RARELY    Drug use: No    Sexual activity: Not Currently       History of present illness:  Patient comes in today for the right hip.  She continues complain of severe right hip pain.  She is absolutely miserable.  She has had 2 intra-articular injections.      Review of Systems:    Constitution: Negative for chills, fever, and sweats.  Negative for unexplained weight loss.    HENT:  Negative for headaches and blurry vision.    Cardiovascular:Negative for chest pain or irregular heart beat. Negative for hypertension.    Respiratory:  Negative for cough and shortness of breath.    Gastrointestinal: Negative for abdominal pain, heartburn, melena, nausea, and vomitting.    Genitourinary:  Negative bladder incontinence and dysuria.    Musculoskeletal:  See HPI for details.     Neurological: Negative for numbness.    Psychiatric/Behavioral: Negative for  depression.  The patient is not nervous/anxious.      Endocrine: Negative for polyuria    Hematologic/Lymphatic: Negative for bleeding problem.  Does not bruise/bleed easily.    Skin: Negative for poor would healing and rash    Objective:      Physical Examination:    Vital Signs:    Vitals:    10/06/22 1305   BP: 118/72       Body mass index is 34.11 kg/m².    This a well-developed, well nourished patient in no acute distress.  They are alert and oriented and cooperative to examination.        Patient has severe groin pain with internal-external rotation of the right hip.  Negative straight leg raises EHL is intact deep tendon reflexes are intact  Pertinent New Results:    XRAY Report / Interpretation:       Assessment/Plan:      Severe groin pain out of proportion of what the x-rays show.  I am concerned that she has avascular necrosis or an occult fracture.  I am going to get an MRI of the left hip to get a better look.  I will see her back with that information      This note was created using Dragon voice recognition software that occasionally misinterpreted phrases or words.

## 2022-10-18 ENCOUNTER — PATIENT MESSAGE (OUTPATIENT)
Dept: ORTHOPEDICS | Facility: CLINIC | Age: 67
End: 2022-10-18

## 2022-10-20 ENCOUNTER — HOSPITAL ENCOUNTER (OUTPATIENT)
Dept: RADIOLOGY | Facility: HOSPITAL | Age: 67
Discharge: HOME | End: 2022-10-20
Attending: ORTHOPAEDIC SURGERY
Payer: MEDICARE

## 2022-10-20 DIAGNOSIS — M16.12 PRIMARY OSTEOARTHRITIS OF LEFT HIP: ICD-10-CM

## 2022-10-20 DIAGNOSIS — M87.00 AVN (AVASCULAR NECROSIS OF BONE): ICD-10-CM

## 2022-10-20 PROCEDURE — 73700 CT LOWER EXTREMITY W/O DYE: CPT | Mod: TC,LT

## 2022-10-25 ENCOUNTER — PATIENT MESSAGE (OUTPATIENT)
Dept: ORTHOPEDICS | Facility: CLINIC | Age: 67
End: 2022-10-25
Payer: MEDICARE

## 2022-10-25 ENCOUNTER — OFFICE VISIT (OUTPATIENT)
Dept: ORTHOPEDICS | Facility: CLINIC | Age: 67
End: 2022-10-25
Payer: MEDICARE

## 2022-10-25 VITALS — BODY MASS INDEX: 34.16 KG/M2 | WEIGHT: 205 LBS | HEIGHT: 65 IN

## 2022-10-25 DIAGNOSIS — M16.12 PRIMARY OSTEOARTHRITIS OF LEFT HIP: Primary | ICD-10-CM

## 2022-10-25 PROCEDURE — 99213 OFFICE O/P EST LOW 20 MIN: CPT | Mod: S$GLB,,, | Performed by: ORTHOPAEDIC SURGERY

## 2022-10-25 PROCEDURE — 99213 PR OFFICE/OUTPT VISIT, EST, LEVL III, 20-29 MIN: ICD-10-PCS | Mod: S$GLB,,, | Performed by: ORTHOPAEDIC SURGERY

## 2022-10-25 NOTE — PROGRESS NOTES
[facility]    Subjective:     Chief Complaint:   Chief Complaint   Patient presents with    Left Hip - Pain     Left hip follow up. Here for CT results.        Past Medical History:   Diagnosis Date    Anxiety     Arthritis     Back pain     Cancer     Depression     Femoral acetabular impingement 11/6/2017    GERD (gastroesophageal reflux disease)     MVP (mitral valve prolapse)     RLS (restless legs syndrome)     Wears dentures     UPPER AND LOWER BRIDGE    Wears glasses        Past Surgical History:   Procedure Laterality Date    BACK SURGERY      nerve stimulator    COLONOSCOPY      COLONOSCOPY N/A 7/19/2022    Procedure: COLONOSCOPY;  Surgeon: Kuldeep Gill MD;  Location: [facility] ENDO;  Service: Endoscopy;  Laterality: N/A;    HIP ARTHROPLASTY Right 6/25/2018    Procedure: ARTHROPLASTY, HIP;  Surgeon: Prosper Hodge MD;  Location: [facility] OR;  Service: Orthopedics;  Laterality: Right;  william    HYSTERECTOMY      implanted spinal cord stimulator      NONFUNCTIONING X 15 YEARS    SACRAL NERVE STIMULATOR PLACEMENT Left     SKIN CANCER EXCISION      THYROIDECTOMY N/A 3/9/2020    Procedure: TOTAL THYROIDECTOMY;  Surgeon: Kelsie Rodgers MD;  Location: [facility] OR;  Service: General;  Laterality: N/A;    TONSILLECTOMY         Current Outpatient Medications   Medication Sig    acyclovir (ZOVIRAX) 400 MG tablet acyclovir 400 mg tablet   TAKE 1 TABLET BY MOUTH TWICE DAILY    ergocalciferol (ERGOCALCIFEROL) 50,000 unit Cap Take 10,000 Units by mouth once daily.    esomeprazole magnesium (NEXIUM ORAL)     rOPINIRole (REQUIP) 1 MG tablet as needed.     sertraline (ZOLOFT) 100 MG tablet Take 1 tablet (100 mg total) by mouth once daily.    SYNTHROID 125 mcg tablet Take 112 mcg by mouth once daily.     Current Facility-Administered Medications   Medication    acetaminophen tablet 650 mg    diphenhydrAMINE injection 25 mg    methylPREDNISolone sodium succinate injection 40 mg    ondansetron injection 4 mg    sodium  chloride 0.9% 500 mL flush bag    sodium chloride 0.9% flush 10 mL     Facility-Administered Medications Ordered in Other Visits   Medication    triamcinolone acetonide injection 40 mg       Review of patient's allergies indicates:   Allergen Reactions    Morphine Swelling     NECK SWELLED    Demerol (pf) [meperidine (pf)] Other (See Comments)     HEART RACES, BOUNCES OFF WALL    Erythromycin Itching       Family History   Problem Relation Age of Onset    Diabetes Father         late onset    Glaucoma Father     Diabetes Other         nephew    Colon cancer Neg Hx     Breast cancer Neg Hx     Colon polyps Neg Hx        Social History     Socioeconomic History    Marital status:     Number of children: 1   Tobacco Use    Smoking status: Former     Packs/day: 1.00     Years: 37.00     Pack years: 37.00     Types: Cigarettes     Quit date: 2009     Years since quittin.7    Smokeless tobacco: Never   Substance and Sexual Activity    Alcohol use: Yes     Comment: RARELY    Drug use: No    Sexual activity: Not Currently       History of present illness:  Ms. Castro comes in today for follow-up for her left hip.  She has had her CT scan.  She is here today with those results.  She does continue to complain of pain about the left hip, in the groin specifically.  It is worse with any weight-bearing activities.  It is beginning to interfere with her sleep and her activities of daily living specifically.  She does have a history of a right total hip arthroplasty years ago with good result.    Review of Systems:    Constitution: Negative for chills, fever, and sweats.  Negative for unexplained weight loss.    HENT:  Negative for headaches and blurry vision.    Cardiovascular:Negative for chest pain or irregular heart beat. Negative for hypertension.    Respiratory:  Negative for cough and shortness of breath.    Gastrointestinal: Negative for abdominal pain, heartburn, melena, nausea, and  vomitting.    Genitourinary:  Negative bladder incontinence and dysuria.    Musculoskeletal:  See HPI for details.     Neurological: Negative for numbness.    Psychiatric/Behavioral: Negative for depression.  The patient is not nervous/anxious.      Endocrine: Negative for polyuria    Hematologic/Lymphatic: Negative for bleeding problem.  Does not bruise/bleed easily.    Skin: Negative for poor would healing and rash    Objective:      Physical Examination:    Vital Signs:    Vitals:    10/25/22 1052   BP: 121/71       Body mass index is 34.11 kg/m².    This a well-developed, well nourished patient in no acute distress.  They are alert and oriented and cooperative to examination.        Left hip exam:  Skin to her left hip is clean dry and intact.  There is no erythema or ecchymosis.  There are no signs or symptoms of infection.  Patient is neurovascularly intact throughout the left lower extremity.  Left calf is soft and nontender.  She has a negative straight leg raise on the left.  She has markedly increased pain with attempts at internal/external rotation of the left hip.  She does not have any loss of motion.  She is nontender over left greater trochanter.  She can weightbear as tolerated left lower extremity but it is painful for her and she has an antalgic gait.    Pertinent New Results:    XRAY Report / Interpretation:   No new radiographs were taken on today's clinic visit.  However, did review the images and report of her left hip CT scan which was significant for osteoarthritic changes of the left hip without evidence of avascular necrosis or fracture.  She does have subchondral cysts within the acetabulum.    Assessment/Plan:      1.  Left hip osteoarthritis.    Fortunately, the patient does not have avascular necrosis or an occult fracture.  However, she does have arthritis which seems to be contributing to her severe pain in her left hip.  Patient would like to have this addressed definitively.  She  "has tried 2 different intra-articular corticosteroid injections to her left hip 1 providing relief for approximately 1 month in the 2nd 1 provided relief for approximately 10 days.  Various over-the-counter NSAIDs have not been efficacious.  Risks and benefits of proceeding with a left total hip arthroplasty were discussed with her today.  All of her questions were answered.  She clearly understood and wished to proceed.  Surgical consents were obtained today.    Risks of the procedure were reviewed with the patient.  This includes, but is not limited to: bleeding, pain, swelling, decreased range of motion, nerve injury/damage, wound dehiscence, hardware failure, deep vein thrombosis, pulmonary embolism, reflex sympathetic dystrophy, leg length discrepancy, dislocation, thigh pain, and possible need for further surgery.    Deni Hill, Physician Assistant, served in the capacity as a "scribe" for this patient encounter.  A "face-to-face" encounter occurred with Dr. Prosper Hodge on this date.  The treatment plan and medical decision-making is outlined above. Patient was seen and examined with a chaperone.       This note was created using Dragon voice recognition software that occasionally misinterpreted phrases or words.          "

## 2022-11-07 DIAGNOSIS — M16.12 PRIMARY OSTEOARTHRITIS OF LEFT HIP: Primary | ICD-10-CM

## 2022-11-07 DIAGNOSIS — Z01.818 PRE-OP TESTING: ICD-10-CM

## 2022-11-14 ENCOUNTER — OFFICE VISIT (OUTPATIENT)
Dept: ORTHOPEDICS | Facility: CLINIC | Age: 67
End: 2022-11-14
Payer: MEDICARE

## 2022-11-14 ENCOUNTER — PATIENT MESSAGE (OUTPATIENT)
Dept: SURGERY | Facility: HOSPITAL | Age: 67
End: 2022-11-14
Payer: MEDICARE

## 2022-11-14 VITALS
WEIGHT: 205 LBS | SYSTOLIC BLOOD PRESSURE: 132 MMHG | DIASTOLIC BLOOD PRESSURE: 80 MMHG | BODY MASS INDEX: 34.16 KG/M2 | HEIGHT: 65 IN

## 2022-11-14 DIAGNOSIS — M17.11 PATELLOFEMORAL ARTHRITIS OF RIGHT KNEE: ICD-10-CM

## 2022-11-14 DIAGNOSIS — M17.12 PRIMARY OSTEOARTHRITIS OF LEFT KNEE: Primary | ICD-10-CM

## 2022-11-14 DIAGNOSIS — M17.11 PRIMARY OSTEOARTHRITIS OF RIGHT KNEE: ICD-10-CM

## 2022-11-14 DIAGNOSIS — M17.12 PATELLOFEMORAL ARTHRITIS OF LEFT KNEE: ICD-10-CM

## 2022-11-14 DIAGNOSIS — M75.41 IMPINGEMENT SYNDROME OF RIGHT SHOULDER: ICD-10-CM

## 2022-11-14 PROCEDURE — 99214 OFFICE O/P EST MOD 30 MIN: CPT | Mod: 25,S$GLB,, | Performed by: PHYSICIAN ASSISTANT

## 2022-11-14 PROCEDURE — 20610 DRAIN/INJ JOINT/BURSA W/O US: CPT | Mod: 59,RT,S$GLB, | Performed by: PHYSICIAN ASSISTANT

## 2022-11-14 PROCEDURE — 20610 DRAIN/INJ JOINT/BURSA W/O US: CPT | Mod: 50,S$GLB,, | Performed by: PHYSICIAN ASSISTANT

## 2022-11-14 PROCEDURE — 20610 LARGE JOINT ASPIRATION/INJECTION: R KNEE: ICD-10-PCS | Mod: 50,S$GLB,, | Performed by: PHYSICIAN ASSISTANT

## 2022-11-14 PROCEDURE — 99214 PR OFFICE/OUTPT VISIT, EST, LEVL IV, 30-39 MIN: ICD-10-PCS | Mod: 25,S$GLB,, | Performed by: PHYSICIAN ASSISTANT

## 2022-11-14 RX ORDER — TRIAMCINOLONE ACETONIDE 40 MG/ML
40 INJECTION, SUSPENSION INTRA-ARTICULAR; INTRAMUSCULAR
Status: DISCONTINUED | OUTPATIENT
Start: 2022-11-14 | End: 2022-11-14 | Stop reason: HOSPADM

## 2022-11-14 RX ADMIN — TRIAMCINOLONE ACETONIDE 40 MG: 40 INJECTION, SUSPENSION INTRA-ARTICULAR; INTRAMUSCULAR at 11:11

## 2022-11-14 NOTE — PROGRESS NOTES
[facility]  [facility] [facility] [address]  [place] [address]  Phone: [phone]   [phone]    Patient's PCP: Liliana Rangel MD  Referring Provider: No ref. provider found    Subjective:      Chief Complaint:   Chief Complaint   Patient presents with    Right Knee - Pain     Patient has having b/l knee pain, she is requesting an injection in both.    Left Knee - Pain    Right Shoulder - Pain     Patient is having right shoulder pain, she is requesting an injection.       Past Medical History:   Diagnosis Date    Anxiety     Arthritis     Back pain     Cancer     Depression     Femoral acetabular impingement 11/6/2017    GERD (gastroesophageal reflux disease)     MVP (mitral valve prolapse)     RLS (restless legs syndrome)     Wears dentures     UPPER AND LOWER BRIDGE    Wears glasses        Past Surgical History:   Procedure Laterality Date    BACK SURGERY      nerve stimulator    COLONOSCOPY      COLONOSCOPY N/A 7/19/2022    Procedure: COLONOSCOPY;  Surgeon: Kuldeep Gill MD;  Location: [facility];  Service: Endoscopy;  Laterality: N/A;    HIP ARTHROPLASTY Right 6/25/2018    Procedure: ARTHROPLASTY, HIP;  Surgeon: Prosper Hodge MD;  Location: [facility];  Service: Orthopedics;  Laterality: Right;  william    HYSTERECTOMY      implanted spinal cord stimulator      NONFUNCTIONING X 15 YEARS    SACRAL NERVE STIMULATOR PLACEMENT Left     SKIN CANCER EXCISION      THYROIDECTOMY N/A 3/9/2020    Procedure: TOTAL THYROIDECTOMY;  Surgeon: Kelsie Rodgers MD;  Location: [facility];  Service: General;  Laterality: N/A;    TONSILLECTOMY         Current Outpatient Medications   Medication Sig    acyclovir (ZOVIRAX) 400 MG tablet acyclovir 400 mg tablet   TAKE 1 TABLET BY MOUTH TWICE DAILY    ergocalciferol (ERGOCALCIFEROL) 50,000 unit Cap Take 10,000 Units by mouth once daily.    esomeprazole magnesium (NEXIUM ORAL)     rOPINIRole (REQUIP) 1 MG tablet as needed.     sertraline (ZOLOFT) 100 MG tablet Take 1  tablet (100 mg total) by mouth once daily.    SYNTHROID 125 mcg tablet Take 112 mcg by mouth once daily.     Current Facility-Administered Medications   Medication    acetaminophen tablet 650 mg    diphenhydrAMINE injection 25 mg    methylPREDNISolone sodium succinate injection 40 mg    ondansetron injection 4 mg    sodium chloride 0.9% 500 mL flush bag    sodium chloride 0.9% flush 10 mL     Facility-Administered Medications Ordered in Other Visits   Medication    triamcinolone acetonide injection 40 mg       Review of patient's allergies indicates:   Allergen Reactions    Morphine Swelling     NECK SWELLED    Demerol (pf) [meperidine (pf)] Other (See Comments)     HEART RACES, BOUNCES OFF WALL    Erythromycin Itching    Oxycodone-acetaminophen Itching       Family History   Problem Relation Age of Onset    Diabetes Father         late onset    Glaucoma Father     Diabetes Other         nephew    Colon cancer Neg Hx     Breast cancer Neg Hx     Colon polyps Neg Hx        Social History     Socioeconomic History    Marital status:     Number of children: 1   Tobacco Use    Smoking status: Former     Packs/day: 1.00     Years: 37.00     Pack years: 37.00     Types: Cigarettes     Quit date: 2009     Years since quittin.8    Smokeless tobacco: Never   Substance and Sexual Activity    Alcohol use: Yes     Comment: RARELY    Drug use: No    Sexual activity: Not Currently       History of present illness:  Ms. Castro comes in today follow-up for her right shoulder and for her bilateral knees.  She was last seen and injected in her right shoulder and right knee approximately 2 months ago for relief of her symptoms.  Unfortunate the pain has returned here recently as well as into her left knee.  She attributes this to her antalgic gait for her left hip avascular necrosis impending left total hip arthroplasty next month.  She is interested in injections in both knees and her right shoulder.  She denies any  new injury or trauma to any of these joints.    Review of Systems:    Constitutional: Negative for chills, fever and weight loss.   HENT: Negative for congestion.    Eyes: Negative for discharge and redness.   Respiratory: Negative for cough and shortness of breath.    Cardiovascular: Negative for chest pain.   Gastrointestinal: Negative for nausea and vomiting.   Musculoskeletal: See HPI.   Skin: Negative for rash.   Neurological: Negative for headaches.   Endo/Heme/Allergies: Does not bruise/bleed easily.   Psychiatric/Behavioral: The patient is not nervous/anxious.    All other systems reviewed and are negative.       Objective:      Physical Examination:    Vital Signs:    Vitals:    11/14/22 1040   BP: 132/80       Body mass index is 34.11 kg/m².    This a well-developed, well nourished patient in no acute distress.  They are alert and oriented and cooperative to examination.     Bilateral knee exam:  Skin the bilateral knees is clean dry and intact.  There is no erythema or ecchymosis.  There are no signs or symptoms of infection.  Patient is neurovascular intact throughout bilateral lower extremities.  Bilateral calves are soft and nontender.  Bilateral knee range of motion is approximately 0-100 degrees.  Both knees are stable to varus and valgus stresses while held in extension.  She has a negative straight leg raise bilaterally.  Both knees are diffusely tender medially and laterally.  She has mild crepitus with range of motioning of her bilateral knees.  She can weightbear as tolerated on bilateral lower extremities.    Right shoulder exam:  Skin to her right shoulder is clean dry and intact.  There is no erythema or ecchymosis.  There are no signs or symptoms of infection.  Patient is neurovascular intact throughout the right upper extremity.  She can actively forward flex to approximately 170° and externally rotate to approximately 90°.  Her right rotator cuff tendon is grossly intact resisted muscle  testing but is somewhat weak but definitely painful for her.  She has a positive Neer impingement sign as well as a positive Vega test.  She is tender to palpation over right acromioclavicular joint has a positive crossover test.  Spurling sign is negative.    Pertinent New Results:        XRAY Report / Interpretation:   No new radiographs were taken on today's clinic visit.      Assessment:       1. Primary osteoarthritis of left knee    2. Impingement syndrome of right shoulder    3. Primary osteoarthritis of right knee    4. Patellofemoral arthritis of left knee    5. Patellofemoral arthritis of right knee      Plan:     Primary osteoarthritis of left knee  -     X-Ray Knee 3 View Bilateral  -     Large Joint Aspiration/Injection: L knee    Impingement syndrome of right shoulder  -     Large Joint Aspiration/Injection: R subacromial bursa    Primary osteoarthritis of right knee  -     Large Joint Aspiration/Injection: R knee    Patellofemoral arthritis of left knee    Patellofemoral arthritis of right knee      No follow-ups on file.    I injected her bilateral knees today via an anterior lateral approach 40 mg of Kenalog and lidocaine respectively.  Also injected her right shoulder subacromial space via a posterior lateral approach with 40 mg of Kenalog and lidocaine.  She tolerated all these injections well.  We will be seeing her again for her scheduled left total hip arthroplasty in approximately 1 month.  We will follow her postoperatively for her hip we can evaluate her response to these injections today.        Deni Hill, MAUREENS, PA-C    This note was created using [employer] voice recognition software that occasionally misinterprets words or phrases.

## 2022-11-14 NOTE — H&P (VIEW-ONLY)
[facility]  [facility] [facility] [address]  [place] [address]  Phone: [phone]   [phone]    Patient's PCP: Liliana Rangel MD  Referring Provider: No ref. provider found    Subjective:      Chief Complaint:   Chief Complaint   Patient presents with    Right Knee - Pain     Patient has having b/l knee pain, she is requesting an injection in both.    Left Knee - Pain    Right Shoulder - Pain     Patient is having right shoulder pain, she is requesting an injection.       Past Medical History:   Diagnosis Date    Anxiety     Arthritis     Back pain     Cancer     Depression     Femoral acetabular impingement 11/6/2017    GERD (gastroesophageal reflux disease)     MVP (mitral valve prolapse)     RLS (restless legs syndrome)     Wears dentures     UPPER AND LOWER BRIDGE    Wears glasses        Past Surgical History:   Procedure Laterality Date    BACK SURGERY      nerve stimulator    COLONOSCOPY      COLONOSCOPY N/A 7/19/2022    Procedure: COLONOSCOPY;  Surgeon: Kuldeep Gill MD;  Location: [facility];  Service: Endoscopy;  Laterality: N/A;    HIP ARTHROPLASTY Right 6/25/2018    Procedure: ARTHROPLASTY, HIP;  Surgeon: Prosper Hodge MD;  Location: [facility];  Service: Orthopedics;  Laterality: Right;  william    HYSTERECTOMY      implanted spinal cord stimulator      NONFUNCTIONING X 15 YEARS    SACRAL NERVE STIMULATOR PLACEMENT Left     SKIN CANCER EXCISION      THYROIDECTOMY N/A 3/9/2020    Procedure: TOTAL THYROIDECTOMY;  Surgeon: Kelsie Rodgers MD;  Location: [facility];  Service: General;  Laterality: N/A;    TONSILLECTOMY         Current Outpatient Medications   Medication Sig    acyclovir (ZOVIRAX) 400 MG tablet acyclovir 400 mg tablet   TAKE 1 TABLET BY MOUTH TWICE DAILY    ergocalciferol (ERGOCALCIFEROL) 50,000 unit Cap Take 10,000 Units by mouth once daily.    esomeprazole magnesium (NEXIUM ORAL)     rOPINIRole (REQUIP) 1 MG tablet as needed.     sertraline (ZOLOFT) 100 MG tablet Take 1  tablet (100 mg total) by mouth once daily.    SYNTHROID 125 mcg tablet Take 112 mcg by mouth once daily.     Current Facility-Administered Medications   Medication    acetaminophen tablet 650 mg    diphenhydrAMINE injection 25 mg    methylPREDNISolone sodium succinate injection 40 mg    ondansetron injection 4 mg    sodium chloride 0.9% 500 mL flush bag    sodium chloride 0.9% flush 10 mL     Facility-Administered Medications Ordered in Other Visits   Medication    triamcinolone acetonide injection 40 mg       Review of patient's allergies indicates:   Allergen Reactions    Morphine Swelling     NECK SWELLED    Demerol (pf) [meperidine (pf)] Other (See Comments)     HEART RACES, BOUNCES OFF WALL    Erythromycin Itching    Oxycodone-acetaminophen Itching       Family History   Problem Relation Age of Onset    Diabetes Father         late onset    Glaucoma Father     Diabetes Other         nephew    Colon cancer Neg Hx     Breast cancer Neg Hx     Colon polyps Neg Hx        Social History     Socioeconomic History    Marital status:     Number of children: 1   Tobacco Use    Smoking status: Former     Packs/day: 1.00     Years: 37.00     Pack years: 37.00     Types: Cigarettes     Quit date: 2009     Years since quittin.8    Smokeless tobacco: Never   Substance and Sexual Activity    Alcohol use: Yes     Comment: RARELY    Drug use: No    Sexual activity: Not Currently       History of present illness:  Ms. Castro comes in today follow-up for her right shoulder and for her bilateral knees.  She was last seen and injected in her right shoulder and right knee approximately 2 months ago for relief of her symptoms.  Unfortunate the pain has returned here recently as well as into her left knee.  She attributes this to her antalgic gait for her left hip avascular necrosis impending left total hip arthroplasty next month.  She is interested in injections in both knees and her right shoulder.  She denies any  new injury or trauma to any of these joints.    Review of Systems:    Constitutional: Negative for chills, fever and weight loss.   HENT: Negative for congestion.    Eyes: Negative for discharge and redness.   Respiratory: Negative for cough and shortness of breath.    Cardiovascular: Negative for chest pain.   Gastrointestinal: Negative for nausea and vomiting.   Musculoskeletal: See HPI.   Skin: Negative for rash.   Neurological: Negative for headaches.   Endo/Heme/Allergies: Does not bruise/bleed easily.   Psychiatric/Behavioral: The patient is not nervous/anxious.    All other systems reviewed and are negative.       Objective:      Physical Examination:    Vital Signs:    Vitals:    11/14/22 1040   BP: 132/80       Body mass index is 34.11 kg/m².    This a well-developed, well nourished patient in no acute distress.  They are alert and oriented and cooperative to examination.     Bilateral knee exam:  Skin the bilateral knees is clean dry and intact.  There is no erythema or ecchymosis.  There are no signs or symptoms of infection.  Patient is neurovascular intact throughout bilateral lower extremities.  Bilateral calves are soft and nontender.  Bilateral knee range of motion is approximately 0-100 degrees.  Both knees are stable to varus and valgus stresses while held in extension.  She has a negative straight leg raise bilaterally.  Both knees are diffusely tender medially and laterally.  She has mild crepitus with range of motioning of her bilateral knees.  She can weightbear as tolerated on bilateral lower extremities.    Right shoulder exam:  Skin to her right shoulder is clean dry and intact.  There is no erythema or ecchymosis.  There are no signs or symptoms of infection.  Patient is neurovascular intact throughout the right upper extremity.  She can actively forward flex to approximately 170° and externally rotate to approximately 90°.  Her right rotator cuff tendon is grossly intact resisted muscle  testing but is somewhat weak but definitely painful for her.  She has a positive Neer impingement sign as well as a positive Vega test.  She is tender to palpation over right acromioclavicular joint has a positive crossover test.  Spurling sign is negative.    Pertinent New Results:        XRAY Report / Interpretation:   No new radiographs were taken on today's clinic visit.      Assessment:       1. Primary osteoarthritis of left knee    2. Impingement syndrome of right shoulder    3. Primary osteoarthritis of right knee    4. Patellofemoral arthritis of left knee    5. Patellofemoral arthritis of right knee      Plan:     Primary osteoarthritis of left knee  -     X-Ray Knee 3 View Bilateral  -     Large Joint Aspiration/Injection: L knee    Impingement syndrome of right shoulder  -     Large Joint Aspiration/Injection: R subacromial bursa    Primary osteoarthritis of right knee  -     Large Joint Aspiration/Injection: R knee    Patellofemoral arthritis of left knee    Patellofemoral arthritis of right knee      No follow-ups on file.    I injected her bilateral knees today via an anterior lateral approach 40 mg of Kenalog and lidocaine respectively.  Also injected her right shoulder subacromial space via a posterior lateral approach with 40 mg of Kenalog and lidocaine.  She tolerated all these injections well.  We will be seeing her again for her scheduled left total hip arthroplasty in approximately 1 month.  We will follow her postoperatively for her hip we can evaluate her response to these injections today.        Deni Hill, MAUREENS, PA-C    This note was created using [employer] voice recognition software that occasionally misinterprets words or phrases.

## 2022-11-14 NOTE — PROCEDURES
Large Joint Aspiration/Injection: R knee    Date/Time: 11/14/2022 11:30 AM  Performed by: Deni Hill PA-C  Authorized by: Deni Hill PA-C     Consent Done?:  Yes (Verbal)  Indications:  Pain  Site marked: the procedure site was marked    Timeout: prior to procedure the correct patient, procedure, and site was verified    Prep: patient was prepped and draped in usual sterile fashion      Local anesthesia used?: Yes    Local anesthetic:  Lidocaine 1% without epinephrine  Ultrasonic Guidance for needle placement?: No    Location:  Knee  Site:  R knee  Medications:  40 mg triamcinolone acetonide 40 mg/mL  Patient tolerance:  Patient tolerated the procedure well with no immediate complications  Large Joint Aspiration/Injection: L knee    Date/Time: 11/14/2022 11:30 AM  Performed by: Deni Hill PA-C  Authorized by: Deni Hill PA-C     Consent Done?:  Yes (Verbal)  Indications:  Pain  Site marked: the procedure site was marked    Timeout: prior to procedure the correct patient, procedure, and site was verified    Prep: patient was prepped and draped in usual sterile fashion      Local anesthesia used?: Yes    Local anesthetic:  Lidocaine 1% without epinephrine  Ultrasonic Guidance for needle placement?: No    Location:  Knee  Site:  L knee  Medications:  40 mg triamcinolone acetonide 40 mg/mL  Patient tolerance:  Patient tolerated the procedure well with no immediate complications  Large Joint Aspiration/Injection: R subacromial bursa    Date/Time: 11/14/2022 11:30 AM  Performed by: Deni Hill PA-C  Authorized by: Deni Hill PA-C     Consent Done?:  Yes (Verbal)  Indications:  Pain  Site marked: the procedure site was marked    Timeout: prior to procedure the correct patient, procedure, and site was verified    Prep: patient was prepped and draped in usual sterile fashion      Local anesthesia used?: Yes    Local anesthetic:  Lidocaine 1% without  epinephrine  Ultrasonic Guidance for needle placement?: No    Location:  Shoulder  Site:  R subacromial bursa  Medications:  40 mg triamcinolone acetonide 40 mg/mL  Patient tolerance:  Patient tolerated the procedure well with no immediate complications

## 2022-11-16 ENCOUNTER — PATIENT MESSAGE (OUTPATIENT)
Dept: ORTHOPEDICS | Facility: CLINIC | Age: 67
End: 2022-11-16
Payer: MEDICARE

## 2022-11-21 ENCOUNTER — HOSPITAL ENCOUNTER (OUTPATIENT)
Dept: RADIOLOGY | Facility: HOSPITAL | Age: 67
Discharge: HOME | End: 2022-11-21
Attending: ORTHOPAEDIC SURGERY
Payer: MEDICARE

## 2022-11-21 ENCOUNTER — HOSPITAL ENCOUNTER (OUTPATIENT)
Dept: PREADMISSION TESTING | Facility: HOSPITAL | Age: 67
Discharge: HOME | End: 2022-11-21
Attending: ORTHOPAEDIC SURGERY
Payer: MEDICARE

## 2022-11-21 VITALS — WEIGHT: 201 LBS | BODY MASS INDEX: 33.49 KG/M2 | HEIGHT: 65 IN

## 2022-11-21 DIAGNOSIS — M16.12 PRIMARY OSTEOARTHRITIS OF LEFT HIP: Primary | ICD-10-CM

## 2022-11-21 DIAGNOSIS — M16.12 PRIMARY OSTEOARTHRITIS OF LEFT HIP: ICD-10-CM

## 2022-11-21 DIAGNOSIS — Z01.818 PRE-OP TESTING: ICD-10-CM

## 2022-11-21 LAB
ABO + RH BLD: NORMAL
ALBUMIN SERPL BCP-MCNC: 3.7 G/DL (ref 3.5–5.2)
ALP SERPL-CCNC: 79 U/L (ref 55–135)
ALT SERPL W/O P-5'-P-CCNC: 16 U/L (ref 10–44)
ANION GAP SERPL CALC-SCNC: 9 MMOL/L (ref 8–16)
AST SERPL-CCNC: 11 U/L (ref 10–40)
BASOPHILS # BLD AUTO: 0.02 K/UL (ref 0–0.2)
BASOPHILS NFR BLD: 0.2 % (ref 0–1.9)
BILIRUB SERPL-MCNC: 0.3 MG/DL (ref 0.1–1)
BLD GP AB SCN CELLS X3 SERPL QL: NORMAL
BUN SERPL-MCNC: 18 MG/DL (ref 8–23)
CALCIUM SERPL-MCNC: 9.9 MG/DL (ref 8.7–10.5)
CHLORIDE SERPL-SCNC: 104 MMOL/L (ref 95–110)
CO2 SERPL-SCNC: 28 MMOL/L (ref 23–29)
CREAT SERPL-MCNC: 0.8 MG/DL (ref 0.5–1.4)
DIFFERENTIAL METHOD: ABNORMAL
EOSINOPHIL # BLD AUTO: 0.1 K/UL (ref 0–0.5)
EOSINOPHIL NFR BLD: 0.7 % (ref 0–8)
ERYTHROCYTE [DISTWIDTH] IN BLOOD BY AUTOMATED COUNT: 13.8 % (ref 11.5–14.5)
EST. GFR  (NO RACE VARIABLE): >60 ML/MIN/1.73 M^2
GLUCOSE SERPL-MCNC: 96 MG/DL (ref 70–110)
HCT VFR BLD AUTO: 42.9 % (ref 37–48.5)
HGB BLD-MCNC: 14.3 G/DL (ref 12–16)
IMM GRANULOCYTES # BLD AUTO: 0.15 K/UL (ref 0–0.04)
IMM GRANULOCYTES NFR BLD AUTO: 1.3 % (ref 0–0.5)
LYMPHOCYTES # BLD AUTO: 1.9 K/UL (ref 1–4.8)
LYMPHOCYTES NFR BLD: 16.3 % (ref 18–48)
MCH RBC QN AUTO: 31.4 PG (ref 27–31)
MCHC RBC AUTO-ENTMCNC: 33.3 G/DL (ref 32–36)
MCV RBC AUTO: 94 FL (ref 82–98)
MONOCYTES # BLD AUTO: 0.9 K/UL (ref 0.3–1)
MONOCYTES NFR BLD: 8 % (ref 4–15)
NEUTROPHILS # BLD AUTO: 8.6 K/UL (ref 1.8–7.7)
NEUTROPHILS NFR BLD: 73.5 % (ref 38–73)
NRBC BLD-RTO: 0 /100 WBC
PLATELET # BLD AUTO: 316 K/UL (ref 150–450)
PMV BLD AUTO: 10.3 FL (ref 9.2–12.9)
POTASSIUM SERPL-SCNC: 3.7 MMOL/L (ref 3.5–5.1)
PROT SERPL-MCNC: 6.9 G/DL (ref 6–8.4)
RBC # BLD AUTO: 4.55 M/UL (ref 4–5.4)
SODIUM SERPL-SCNC: 141 MMOL/L (ref 136–145)
WBC # BLD AUTO: 11.65 K/UL (ref 3.9–12.7)

## 2022-11-21 PROCEDURE — 71046 XR CHEST PA AND LATERAL: ICD-10-PCS | Mod: 26,,, | Performed by: RADIOLOGY

## 2022-11-21 PROCEDURE — 99900104 DSU ONLY-NO CHARGE-EA ADD'L HR (STAT)

## 2022-11-21 PROCEDURE — 93010 EKG 12-LEAD: ICD-10-PCS | Mod: ,,, | Performed by: SPECIALIST

## 2022-11-21 PROCEDURE — 99900103 DSU ONLY-NO CHARGE-INITIAL HR (STAT)

## 2022-11-21 PROCEDURE — 85025 COMPLETE CBC W/AUTO DIFF WBC: CPT | Performed by: ORTHOPAEDIC SURGERY

## 2022-11-21 PROCEDURE — 71046 X-RAY EXAM CHEST 2 VIEWS: CPT | Mod: 26,,, | Performed by: RADIOLOGY

## 2022-11-21 PROCEDURE — 86850 RBC ANTIBODY SCREEN: CPT | Performed by: ORTHOPAEDIC SURGERY

## 2022-11-21 PROCEDURE — 93010 ELECTROCARDIOGRAM REPORT: CPT | Mod: ,,, | Performed by: SPECIALIST

## 2022-11-21 PROCEDURE — 71046 X-RAY EXAM CHEST 2 VIEWS: CPT | Mod: TC,FY

## 2022-11-21 PROCEDURE — 87081 CULTURE SCREEN ONLY: CPT | Performed by: ORTHOPAEDIC SURGERY

## 2022-11-21 PROCEDURE — 80053 COMPREHEN METABOLIC PANEL: CPT | Performed by: ORTHOPAEDIC SURGERY

## 2022-11-21 PROCEDURE — 93005 ELECTROCARDIOGRAM TRACING: CPT

## 2022-11-21 NOTE — DISCHARGE INSTRUCTIONS
To confirm, Your doctor has instructed you that surgery is scheduled for: 12/5/22    Please report to [facility], Registration the morning of surgery. You must check-in and receive a wristband before going to your procedure.    Pre-Op will call the afternoon prior to surgery between 1:00 and 6:00 PM with the final arrival time.  Phone number: [phone]    PLEASE NOTE:  The surgery schedule has many variables which may affect the time of your surgery case.  Family members should be available if your surgery time changes.  Plan to be here the day of your procedure between 4-6 hours.    MEDICATIONS:  TAKE ONLY THESE MEDICATIONS WITH A SMALL SIP OF WATER THE MORNING OF YOUR PROCEDURE:    SEE MED LIST        DO NOT TAKE THESE MEDICATIONS 5-7 DAYS PRIOR to your procedure or per your surgeon's request:   ASPIRIN, ALEVE, ADVIL, IBUPROFEN, FISH OIL VITAMIN E, HERBALS  (May take Tylenol)    ONLY if you are prescribed any types of blood thinners such as:  Aspirin, Coumadin, Plavix, Pradaxa, Xarelto, Aggrenox, Effient, Eliquis, Savasya, Brilinta, or any other, ask your surgeon whether you should stop taking them and how long before surgery you should stop.  You may also need to verify with the prescribing physician if it is ok to stop your medication.      INSTRUCTIONS IMPORTANT!!  Do not eat or drink anything between midnight and the time of your procedure- this includes gum, mints, and candy.  Do not smoke or drink alcoholic beverages 24 hours prior to your procedure.  Shower the night before AND the morning of your procedure with a Chlorhexidine wash such as Hibiclens or Dial antibacterial soap from the neck down.  Do not get it on your face or in your eyes.  You may use your own shampoo and face wash. This helps your skin to be as bacteria free as possible.    If you wear contact lenses, dentures, hearing aids or glasses, bring a container to put them in during surgery and give to a family member  for safe keeping.  Please leave all jewelry, piercing's and valuables at home.   DO NOT remove hair from the surgery site.  Do not shave the incision site unless you are given specific instructions to do so.    ONLY if you have been diagnosed with sleep apnea please bring your C-PAP machine.  ONLY if you wear home oxygen please bring your portable oxygen tank the day of your procedure.  ONLY if you have a history of OPEN HEART SURGERY you will need a clearance from your Cardiologist per Anesthesia.      ONLY for patients requiring bowel prep, written instructions will be given by your doctor's office.  ONLY if you have a neuro stimulator, please bring the controller with you the morning of surgery  ONLY if a type and screen test is needed before surgery, please return:  If your doctor has scheduled you for an overnight stay, bring a small overnight bag with any personal items you need.  Make arrangements in advance for transportation home by a responsible adult.  It is not safe to drive a vehicle during the 24 hours after anesthesia.      [facility] Visitor Policy    Effective September 26, 2022    [facility] will resume routine visitation for COVID-19 negative patients, including inpatients, outpatients, and procedural areas, in accordance with local campus procedures.    All [facility] facilities and properties are tobacco free.  Smoking is NOT allowed.   If you have any questions about these instructions, call Pre-Op Admit  Nursing at [phone] or the Pre-Op Day Surgery Unit at [phone].

## 2022-11-21 NOTE — PRE ADMISSION SCREENING
JOINT CAMP ASSESSMENT    Name Gloria Manuel   MRN [id number]    Age/Sex 67 y.o. female    Surgeon Dr. Prosper Hodge   Joint Camp Date 11/21/2022   Surgery Date 12/5/2022   Procedure Left Hip Arthroplasty   Insurance Payor: MEDICARE / Plan: MEDICARE PART A & B / Product Type: Government /    Care Team Patient Care Team:  Liliana Rangel MD as PCP - General (Family Medicine)  Prosper Hodge MD as Consulting Physician (Orthopedic Surgery)  SHUN Ross MD as Consulting Physician (Endocrinology)  Taniya King LPN as Care Coordinator    Pharmacy   [facility] - [place], [place] - [address]  [address]  [place] [address]  Phone: [phone] Fax: [phone]     AM-PAC Score   23   Risk Assessment Score 3     Past Medical History:   Diagnosis Date    Anxiety     Arthritis     Back pain     Cancer     thyroid ca and skin ca    Depression     Femoral acetabular impingement 11/06/2017    GERD (gastroesophageal reflux disease)     MVP (mitral valve prolapse)     RLS (restless legs syndrome)     Thyroid disease     thyroidectomy    Wears dentures     UPPER AND LOWER BRIDGE    Wears glasses        Past Surgical History:   Procedure Laterality Date    BACK SURGERY      nerve stimulator    COLONOSCOPY      COLONOSCOPY N/A 7/19/2022    Procedure: COLONOSCOPY;  Surgeon: Kuldeep Gill MD;  Location: [facility];  Service: Endoscopy;  Laterality: N/A;    HIP ARTHROPLASTY Right 6/25/2018    Procedure: ARTHROPLASTY, HIP;  Surgeon: Prosper Hodge MD;  Location: [facility] OR;  Service: Orthopedics;  Laterality: Right;  william    HYSTERECTOMY      implanted spinal cord stimulator      NONFUNCTIONING X 15 YEARS    SACRAL NERVE STIMULATOR PLACEMENT Left     SKIN CANCER EXCISION      THYROIDECTOMY N/A 3/9/2020    Procedure: TOTAL THYROIDECTOMY;  Surgeon: Kelsie Rodgers MD;  Location: [facility] OR;  Service: General;  Laterality: N/A;    TONSILLECTOMY           Home Enviroment     Living Arrangement: Lives with  spouse  Home Environment: 1-story house/ trailer, walk-in shower  Home Safety Concerns: unremarkable    DISCHARGE CAREGIVER/SUPPORT SYSTEM     Identified post-op caregiver: Patient has spouse / significant other.  Patient's caregiver(s) will be able to provide physical assistance. Patient will have someone to assist overnight.      Caregiver present at pre-op interview:  No      PRE-OPERATIVE FUNCTIONAL STATUS     Employment: Employed full time    Pre-op Functional Status: Patient is independent with mobility/ambulation, transfers, ADL's, IADL's.    Use of assistive device for ambulation: none  ADL: self care  ADL Limitations: difficulty with walking  Medical Restrictions: Unstable ambulation and Decreased range of motions in extremities    POTENTIAL BARRIERS TO DISCHARGE/POTENTIAL POST-OP COMPLICATIONS     No major medical hx that would delay discharge. POSSIBLE SAME DAY DISCHARGE.    DISCHARGE PLAN     Expected LOS of 1 days or less for joint replacement discussed with patient. We also discussed a discharge path of HH for approximately two weeks with a transition to outpatient PT on the third week given no post-op complications.      Patient in agreement with discharge plan: Yes    Discharge to: Discharge home with home health (PT/OT) x2 weeks with transition to outpatient PT     HH:  [facility] ([place]  Patient disclosure form completed and sent to case management for upload to the medical record.      OP PT: [facility]     Home DME: shower chair    Needed DME at D/C: rolling walker and bedside commode     Rx: Per Dr. Hodge at discharge     Meds to Beds: Yes  Patient expected to discharge on Aspirin 81mg by mouth twice daily for DVT prophylaxis.

## 2022-11-21 NOTE — PRE ADMISSION SCREENING
"               CJR Risk Assessment Scale    Patient Name: Gloria Manuel  YOB: 1955  MRN: [id number]            RIsk Factor Measure Recommendation Patient Data Scale/Score   BMI >40 Reconsider surgery, weight loss   Estimated body mass index is 33.45 kg/m² as calculated from the following:    Height as of this encounter: 5' 5" (1.651 m).    Weight as of this encounter: 91.2 kg (201 lb).   [] 0 = 1 - 24.9  [] 1 = 25-29.9  [x] 2 = 30-34.9  [] 3 = 35-39.9  [] 4 = 40-44.9  [] 5 = 45-99.9   Hemoglobin AIC (if applicable) >9 Delay surgery until DM under control  Refer for:  Nutrition Therapy  Exercise   Medication    Lab Results   Component Value Date    HGBA1C 6.1 (H) 11/30/2021       Lab Results   Component Value Date    GLU 96 11/21/2022      [] 0 = 4.0-5.6  [x] 1 = 5.7-6.4  [] 2 = 6.5-6.9  [] 3 = 7.0-7.9  [] 4 = 8.0-8.9  [] 5 = 9.0-12   Hemoglobin (Anemia) <9 Delay surgery   Correct anemia Lab Results   Component Value Date    HGB 14.3 11/21/2022    [] 20 - <9.0                    Albumin <3 Delay surgery &Workup Lab Results   Component Value Date    ALBUMIN 3.7 11/21/2022    [] 20 - <3.0   Smoking Cessation >4 Weeks Delay Surgery  Refer to OP Cessation Class    Former Smoker  Quit: 2009 [] 20 - current smoker                                _____ PPD                    Hx of MI, PE, Arrhythmia, CVA, DVT <30 Days Delay Surgery    N/A [] 20      Infection Variable Delay surgery and re-evaluate   N/A [] 20 - recent/current infection     Depression (PHQ) >10 out of 27 Delay Surgery and re-evaluate  Medication  Counseling              [x] 0     []1     []2     []3      []4      [] 5                    (1-4)      (5-9)  (10-14)  (15-19)   (20-27)     Memory Impairment & Memory loss (Mini-Cog Screening Tool) Advanced dementia and/or Parkinson's Reconsider surgery     [x] 0     []1     []2     []3     []4     [] 5     Physical Conditioning (Modified AM-PAC Per Physical Therapy at Joint Camp) Unable to " ambulate on day of surgery Delay surgery and re-evaluate  Pre-Rehabilitation   (PT evaluation)       [x]  0   []4       []8     []12        []16     []20       (<20%)   (<40%)   (<60%)   (<80% )    (>80%)     Home Environment/Caregiver support  (Per /Navigator Interview)    Availability of basic services and/or approprate assistance during post-operative period Delay surgery and re-evaluate  Safe home environment  Health   1 week post-surgery  Transportation  availability  Ability to obtain DME/Medications post-op    [x] 0     []1     []2     []3     []4     [] 5  [x] 0     []1     []2     []3     []4     [] 5  [x] 0     []1     []2     []3     []4     [] 5  [x] 0     []1     []2     []3     []4     [] 5         MD Contact: Dr. Hodge Comments:  Total Score:  3

## 2022-11-21 NOTE — PRE ADMISSION SCREENING
Patient Name: Gloria Manuel  YOB: 1955   MRN: [id number]     [facility]   Basic Mobility Inpatient Short Form 6 Clicks         How much difficulty does the patient currently have  Unable  A Lot  A Little  None      1. Turning over in bed (including adjusting bedclothes, sheets and blankets)?     1 []    2 []    3 [x]    4 []        2. Sitting down on and standing up from a chair with arms (e.g., wheelchair, bedside commode, etc.)     1 []  2 []  3 []     4 [x]      3. Moving from lying on back to sitting on the side of the bed?     1 []  2 []  3 []    4 [x]    How much help from another person does the patient currently need  Total  A Lot  A Little  None      4. Moving to and from a bed to a chair (including a wheelchair)?    1 []  2 []  3 []    4 [x]      5. Need to walk in hospital room?    1 []  2 []  3 []    4 [x]      6. Climbing 3-5 steps with a railing?    1 []  2 []  3 []    4 [x]       Raw Score:       23           CMS 0-100% Score:    11.20        %   Standardized Score:     56.93          CMS Modifier:      CI                                    [facility]   Basic Mobility Inpatient Short Form 6 Clicks Score Conversion Table*         *Use this form to convert -PAC Basic Mobility Inpatient Raw Scores.   [facility] Inpatient Basic Mobility Short Form Scoring Example   1. Add the number values associated with the response to each item. For example, items totals yield a Raw Score of 21.   2. Match the raw score to the t-Scale scores (t-Scale score = 50.25, SE = 4.69).   3. Find the associated CMS % (CMS % = 28.97%).   4. Locate the correct CMS Functional Modifier Code, or G Code (G code = CJ)     NOTE: Each -PAC Short Form has a separate conversion table. Make sure that you use the correct conversion table.       Instruction Manual - page 45 contains conversion table

## 2022-11-23 LAB — MRSA SPEC QL CULT: NORMAL

## 2022-11-28 ENCOUNTER — PATIENT MESSAGE (OUTPATIENT)
Dept: ORTHOPEDICS | Facility: CLINIC | Age: 67
End: 2022-11-28

## 2022-11-30 ENCOUNTER — PATIENT MESSAGE (OUTPATIENT)
Dept: FAMILY MEDICINE | Facility: CLINIC | Age: 67
End: 2022-11-30
Payer: MEDICARE

## 2022-12-01 DIAGNOSIS — M16.12 PRIMARY OSTEOARTHRITIS OF LEFT HIP: Primary | ICD-10-CM

## 2022-12-01 RX ORDER — CELECOXIB 200 MG/1
200 CAPSULE ORAL 2 TIMES DAILY
Qty: 60 CAPSULE | Refills: 0 | Status: SHIPPED | OUTPATIENT
Start: 2022-12-01 | End: 2023-01-01

## 2022-12-01 RX ORDER — GABAPENTIN 300 MG/1
300 CAPSULE ORAL 2 TIMES DAILY
Qty: 60 CAPSULE | Refills: 0 | Status: SHIPPED | OUTPATIENT
Start: 2022-12-01 | End: 2023-01-06

## 2022-12-01 RX ORDER — DOCUSATE SODIUM 100 MG/1
100 CAPSULE, LIQUID FILLED ORAL 2 TIMES DAILY
Qty: 60 CAPSULE | Refills: 0 | Status: SHIPPED | OUTPATIENT
Start: 2022-12-01 | End: 2023-01-01

## 2022-12-01 RX ORDER — OXYCODONE AND ACETAMINOPHEN 7.5; 325 MG/1; MG/1
1 TABLET ORAL EVERY 6 HOURS PRN
Qty: 28 TABLET | Refills: 0 | Status: SHIPPED | OUTPATIENT
Start: 2022-12-01 | End: 2022-12-02

## 2022-12-01 RX ORDER — ASPIRIN 81 MG/1
81 TABLET ORAL EVERY 12 HOURS
Qty: 60 TABLET | Refills: 0 | Status: SHIPPED | OUTPATIENT
Start: 2022-12-01 | End: 2023-01-06

## 2022-12-02 ENCOUNTER — TELEPHONE (OUTPATIENT)
Dept: FAMILY MEDICINE | Facility: CLINIC | Age: 67
End: 2022-12-02
Payer: MEDICARE

## 2022-12-02 ENCOUNTER — OFFICE VISIT (OUTPATIENT)
Dept: URGENT CARE | Facility: CLINIC | Age: 67
End: 2022-12-02
Payer: MEDICARE

## 2022-12-02 ENCOUNTER — ANESTHESIA EVENT (OUTPATIENT)
Dept: SURGERY | Facility: HOSPITAL | Age: 67
End: 2022-12-02
Payer: MEDICARE

## 2022-12-02 VITALS
TEMPERATURE: 99 F | DIASTOLIC BLOOD PRESSURE: 88 MMHG | BODY MASS INDEX: 33.49 KG/M2 | HEART RATE: 71 BPM | OXYGEN SATURATION: 97 % | HEIGHT: 65 IN | SYSTOLIC BLOOD PRESSURE: 140 MMHG | WEIGHT: 201 LBS

## 2022-12-02 DIAGNOSIS — M16.12 PRIMARY OSTEOARTHRITIS OF LEFT HIP: Primary | ICD-10-CM

## 2022-12-02 DIAGNOSIS — B96.89 BACTERIAL SINUSITIS: Primary | ICD-10-CM

## 2022-12-02 DIAGNOSIS — J32.9 BACTERIAL SINUSITIS: Primary | ICD-10-CM

## 2022-12-02 PROCEDURE — 99212 PR OFFICE/OUTPT VISIT, EST, LEVL II, 10-19 MIN: ICD-10-PCS | Mod: S$GLB,,, | Performed by: NURSE PRACTITIONER

## 2022-12-02 PROCEDURE — 99212 OFFICE O/P EST SF 10 MIN: CPT | Mod: S$GLB,,, | Performed by: NURSE PRACTITIONER

## 2022-12-02 RX ORDER — PROMETHAZINE HYDROCHLORIDE AND DEXTROMETHORPHAN HYDROBROMIDE 6.25; 15 MG/5ML; MG/5ML
5 SYRUP ORAL
Qty: 118 ML | Refills: 0 | Status: SHIPPED | OUTPATIENT
Start: 2022-12-02 | End: 2022-12-12

## 2022-12-02 RX ORDER — CELECOXIB 100 MG/1
400 CAPSULE ORAL ONCE
Status: CANCELLED | OUTPATIENT
Start: 2022-12-02 | End: 2022-12-02

## 2022-12-02 RX ORDER — AMOXICILLIN 500 MG/1
500 CAPSULE ORAL EVERY 8 HOURS
Qty: 30 CAPSULE | Refills: 0 | Status: SHIPPED | OUTPATIENT
Start: 2022-12-02 | End: 2022-12-12

## 2022-12-02 RX ORDER — OXYCODONE HCL 10 MG/1
10 TABLET, FILM COATED, EXTENDED RELEASE ORAL ONCE
Status: CANCELLED | OUTPATIENT
Start: 2022-12-02 | End: 2022-12-02

## 2022-12-02 RX ORDER — ACETAMINOPHEN 500 MG
1000 TABLET ORAL
Status: CANCELLED | OUTPATIENT
Start: 2022-12-02 | End: 2022-12-02

## 2022-12-02 RX ORDER — HYDROCODONE BITARTRATE AND ACETAMINOPHEN 7.5; 325 MG/1; MG/1
1 TABLET ORAL EVERY 6 HOURS PRN
Qty: 28 TABLET | Refills: 0 | Status: SHIPPED | OUTPATIENT
Start: 2022-12-02 | End: 2022-12-09

## 2022-12-02 NOTE — TELEPHONE ENCOUNTER
Spoke with patient. Pt states she just left the clinic and everything has been handled. Encouraged pt to contact office if she has further questions or concerns.

## 2022-12-02 NOTE — TELEPHONE ENCOUNTER
----- Message from Ashlee Giang sent at 12/2/2022 10:28 AM CST -----  Contact: pt at [phone]  Type: Needs Medical Advice  Who Called:  Pt  Best Call Back Number: [phone]  Additional Information: Pt is lost finding clinic.  She followed throw at ER.  Please call back to advise.

## 2022-12-02 NOTE — PROGRESS NOTES
"Subjective:       Patient ID: Gloria Manuel is a 67 y.o. female.    Vitals:  height is 5' 5" (1.651 m) and weight is 91.2 kg (201 lb). Her oral temperature is 99.3 °F (37.4 °C). Her blood pressure is 140/88 (abnormal) and her pulse is 71. Her oxygen saturation is 97%.     Chief Complaint: Cough    This is a 67 y.o. female who presents today with a chief complaint of cough and sinus congestion x 5 days. Patient declined all testing.     Cough  This is a new problem. The current episode started in the past 7 days. The problem has been gradually worsening. The problem occurs constantly. The cough is Productive of sputum. Associated symptoms include headaches, nasal congestion and postnasal drip. Treatments tried: Nyquil. The treatment provided no relief.     HENT:  Positive for postnasal drip.    Respiratory:  Positive for cough.    Neurological:  Positive for headaches.     Objective:      Physical Exam   Constitutional: She is cooperative. normal  HENT:   Head: Normocephalic and atraumatic.   Ears:   Right Ear: Hearing, tympanic membrane, external ear and ear canal normal.   Left Ear: Hearing, tympanic membrane, external ear and ear canal normal.   Nose: No mucosal edema, rhinorrhea, purulent discharge, sinus tenderness or nasal deformity. Right sinus exhibits maxillary sinus tenderness. Left sinus exhibits maxillary sinus tenderness.   Mouth/Throat: Mucous membranes are moist. Oropharynx is clear.   Eyes: Conjunctivae and lids are normal. Pupils are equal, round, and reactive to light. Extraocular movement intact   Neck: Neck supple.   Cardiovascular: Normal rate, regular rhythm, normal heart sounds and normal pulses.   Pulmonary/Chest: Effort normal and breath sounds normal.   Abdominal: Normal appearance.   Musculoskeletal: Normal range of motion.         General: Normal range of motion.   Neurological: She is alert.   Skin: Skin is warm, dry and no rash.   Psychiatric: Her behavior is normal. Mood " normal.   Vitals reviewed.      Assessment:       1. Bacterial sinusitis          Plan:         Bacterial sinusitis  -     amoxicillin (AMOXIL) 500 MG capsule; Take 1 capsule (500 mg total) by mouth every 8 (eight) hours. for 10 days  Dispense: 30 capsule; Refill: 0  -     promethazine-dextromethorphan (PROMETHAZINE-DM) 6.25-15 mg/5 mL Syrp; Take 5 mLs by mouth every 4 to 6 hours as needed (Cough).  Dispense: 118 mL; Refill: 0

## 2022-12-05 ENCOUNTER — HOSPITAL ENCOUNTER (OUTPATIENT)
Facility: HOSPITAL | Age: 67
Discharge: HOME | End: 2022-12-05
Attending: ORTHOPAEDIC SURGERY | Admitting: ORTHOPAEDIC SURGERY
Payer: MEDICARE

## 2022-12-05 ENCOUNTER — ANESTHESIA (OUTPATIENT)
Dept: SURGERY | Facility: HOSPITAL | Age: 67
End: 2022-12-05
Payer: MEDICARE

## 2022-12-05 DIAGNOSIS — M16.12 PRIMARY OSTEOARTHRITIS OF LEFT HIP: Primary | ICD-10-CM

## 2022-12-05 PROCEDURE — 97116 GAIT TRAINING THERAPY: CPT

## 2022-12-05 PROCEDURE — 71000039 HC RECOVERY, EACH ADD'L HOUR: Performed by: ORTHOPAEDIC SURGERY

## 2022-12-05 PROCEDURE — 71000016 HC POSTOP RECOV ADDL HR: Performed by: ORTHOPAEDIC SURGERY

## 2022-12-05 PROCEDURE — 25000003 PHARM REV CODE 250: Performed by: ANESTHESIOLOGY

## 2022-12-05 PROCEDURE — D9220A PRA ANESTHESIA: ICD-10-PCS | Mod: ANES,,, | Performed by: ANESTHESIOLOGY

## 2022-12-05 PROCEDURE — 71000015 HC POSTOP RECOV 1ST HR: Performed by: ORTHOPAEDIC SURGERY

## 2022-12-05 PROCEDURE — 63600175 PHARM REV CODE 636 W HCPCS: Performed by: STUDENT IN AN ORGANIZED HEALTH CARE EDUCATION/TRAINING PROGRAM

## 2022-12-05 PROCEDURE — 97165 OT EVAL LOW COMPLEX 30 MIN: CPT

## 2022-12-05 PROCEDURE — C1889 IMPLANT/INSERT DEVICE, NOC: HCPCS | Performed by: ORTHOPAEDIC SURGERY

## 2022-12-05 PROCEDURE — 36000710: Performed by: ORTHOPAEDIC SURGERY

## 2022-12-05 PROCEDURE — 97110 THERAPEUTIC EXERCISES: CPT

## 2022-12-05 PROCEDURE — 63600175 PHARM REV CODE 636 W HCPCS: Performed by: ORTHOPAEDIC SURGERY

## 2022-12-05 PROCEDURE — D9220A PRA ANESTHESIA: ICD-10-PCS | Mod: CRNA,,, | Performed by: STUDENT IN AN ORGANIZED HEALTH CARE EDUCATION/TRAINING PROGRAM

## 2022-12-05 PROCEDURE — 37000009 HC ANESTHESIA EA ADD 15 MINS: Performed by: ORTHOPAEDIC SURGERY

## 2022-12-05 PROCEDURE — 27201423 OPTIME MED/SURG SUP & DEVICES STERILE SUPPLY: Performed by: ORTHOPAEDIC SURGERY

## 2022-12-05 PROCEDURE — 25000003 PHARM REV CODE 250: Performed by: STUDENT IN AN ORGANIZED HEALTH CARE EDUCATION/TRAINING PROGRAM

## 2022-12-05 PROCEDURE — 37000008 HC ANESTHESIA 1ST 15 MINUTES: Performed by: ORTHOPAEDIC SURGERY

## 2022-12-05 PROCEDURE — 97161 PT EVAL LOW COMPLEX 20 MIN: CPT

## 2022-12-05 PROCEDURE — D9220A PRA ANESTHESIA: Mod: ANES,,, | Performed by: ANESTHESIOLOGY

## 2022-12-05 PROCEDURE — 36000711: Performed by: ORTHOPAEDIC SURGERY

## 2022-12-05 PROCEDURE — 25000003 PHARM REV CODE 250: Performed by: ORTHOPAEDIC SURGERY

## 2022-12-05 PROCEDURE — 99900104 DSU ONLY-NO CHARGE-EA ADD'L HR (STAT): Performed by: ORTHOPAEDIC SURGERY

## 2022-12-05 PROCEDURE — 97535 SELF CARE MNGMENT TRAINING: CPT

## 2022-12-05 PROCEDURE — 99900103 DSU ONLY-NO CHARGE-INITIAL HR (STAT): Performed by: ORTHOPAEDIC SURGERY

## 2022-12-05 PROCEDURE — 94799 UNLISTED PULMONARY SVC/PX: CPT

## 2022-12-05 PROCEDURE — 63600175 PHARM REV CODE 636 W HCPCS: Performed by: ANESTHESIOLOGY

## 2022-12-05 PROCEDURE — D9220A PRA ANESTHESIA: Mod: CRNA,,, | Performed by: STUDENT IN AN ORGANIZED HEALTH CARE EDUCATION/TRAINING PROGRAM

## 2022-12-05 PROCEDURE — C1776 JOINT DEVICE (IMPLANTABLE): HCPCS | Performed by: ORTHOPAEDIC SURGERY

## 2022-12-05 PROCEDURE — 71000033 HC RECOVERY, INTIAL HOUR: Performed by: ORTHOPAEDIC SURGERY

## 2022-12-05 DEVICE — LINER ACET PINN SZ36 10D+4X52M: Type: IMPLANTABLE DEVICE | Site: HIP | Status: FUNCTIONAL

## 2022-12-05 DEVICE — STEM FEM SZ 5 HIGH OFFSET: Type: IMPLANTABLE DEVICE | Site: HIP | Status: FUNCTIONAL

## 2022-12-05 DEVICE — HEAD CERAMAX 12/14 +5 36MM: Type: IMPLANTABLE DEVICE | Site: HIP | Status: FUNCTIONAL

## 2022-12-05 DEVICE — CUP ACET PINN 10D 32MM 52MM: Type: IMPLANTABLE DEVICE | Site: HIP | Status: FUNCTIONAL

## 2022-12-05 RX ORDER — CEFAZOLIN SODIUM 2 G/50ML
2 SOLUTION INTRAVENOUS
Status: COMPLETED | OUTPATIENT
Start: 2022-12-05 | End: 2022-12-05

## 2022-12-05 RX ORDER — FENTANYL CITRATE 50 UG/ML
25 INJECTION, SOLUTION INTRAMUSCULAR; INTRAVENOUS EVERY 5 MIN PRN
Status: DISPENSED | OUTPATIENT
Start: 2022-12-05

## 2022-12-05 RX ORDER — ZOLPIDEM TARTRATE 5 MG/1
5 TABLET ORAL NIGHTLY PRN
Status: CANCELLED | OUTPATIENT
Start: 2022-12-05

## 2022-12-05 RX ORDER — HYDROCODONE BITARTRATE AND ACETAMINOPHEN 5; 325 MG/1; MG/1
1 TABLET ORAL EVERY 4 HOURS PRN
Status: CANCELLED | OUTPATIENT
Start: 2022-12-05

## 2022-12-05 RX ORDER — FAMOTIDINE 20 MG/1
20 TABLET, FILM COATED ORAL 2 TIMES DAILY
Status: CANCELLED | OUTPATIENT
Start: 2022-12-05

## 2022-12-05 RX ORDER — SODIUM CHLORIDE 0.9 % (FLUSH) 0.9 %
5 SYRINGE (ML) INJECTION
Status: DISCONTINUED | OUTPATIENT
Start: 2022-12-05 | End: 2022-12-05 | Stop reason: HOSPADM

## 2022-12-05 RX ORDER — BUPIVACAINE HYDROCHLORIDE 7.5 MG/ML
INJECTION, SOLUTION EPIDURAL; RETROBULBAR
Status: COMPLETED | OUTPATIENT
Start: 2022-12-05 | End: 2022-12-05

## 2022-12-05 RX ORDER — TRANEXAMIC ACID 100 MG/ML
1000 INJECTION, SOLUTION INTRAVENOUS
Status: COMPLETED | OUTPATIENT
Start: 2022-12-05 | End: 2022-12-05

## 2022-12-05 RX ORDER — ONDANSETRON 2 MG/ML
INJECTION INTRAMUSCULAR; INTRAVENOUS
Status: DISCONTINUED | OUTPATIENT
Start: 2022-12-05 | End: 2022-12-05

## 2022-12-05 RX ORDER — FENTANYL CITRATE 50 UG/ML
INJECTION, SOLUTION INTRAMUSCULAR; INTRAVENOUS
Status: DISCONTINUED | OUTPATIENT
Start: 2022-12-05 | End: 2022-12-05

## 2022-12-05 RX ORDER — PROPOFOL 10 MG/ML
INJECTION, EMULSION INTRAVENOUS CONTINUOUS PRN
Status: DISCONTINUED | OUTPATIENT
Start: 2022-12-05 | End: 2022-12-05

## 2022-12-05 RX ORDER — ACETAMINOPHEN 10 MG/ML
INJECTION, SOLUTION INTRAVENOUS
Status: DISCONTINUED | OUTPATIENT
Start: 2022-12-05 | End: 2022-12-05

## 2022-12-05 RX ORDER — MIDAZOLAM HYDROCHLORIDE 1 MG/ML
INJECTION INTRAMUSCULAR; INTRAVENOUS
Status: DISCONTINUED | OUTPATIENT
Start: 2022-12-05 | End: 2022-12-05

## 2022-12-05 RX ORDER — NAPROXEN SODIUM 220 MG/1
81 TABLET, FILM COATED ORAL 2 TIMES DAILY
Status: DISCONTINUED | OUTPATIENT
Start: 2022-12-05 | End: 2022-12-05 | Stop reason: HOSPADM

## 2022-12-05 RX ORDER — BISACODYL 10 MG
10 SUPPOSITORY, RECTAL RECTAL DAILY
Status: CANCELLED | OUTPATIENT
Start: 2022-12-05 | End: 2022-12-08

## 2022-12-05 RX ORDER — DEXTROSE MONOHYDRATE AND SODIUM CHLORIDE 5; .45 G/100ML; G/100ML
INJECTION, SOLUTION INTRAVENOUS CONTINUOUS
Status: CANCELLED | OUTPATIENT
Start: 2022-12-05

## 2022-12-05 RX ORDER — MUPIROCIN 20 MG/G
1 OINTMENT TOPICAL 2 TIMES DAILY
Status: CANCELLED | OUTPATIENT
Start: 2022-12-05 | End: 2022-12-10

## 2022-12-05 RX ORDER — IBUPROFEN 200 MG
1000 TABLET ORAL EVERY 6 HOURS PRN
COMMUNITY

## 2022-12-05 RX ORDER — ONDANSETRON 2 MG/ML
4 INJECTION INTRAMUSCULAR; INTRAVENOUS ONCE AS NEEDED
Status: COMPLETED | OUTPATIENT
Start: 2022-12-05 | End: 2022-12-05

## 2022-12-05 RX ORDER — LIDOCAINE HYDROCHLORIDE 10 MG/ML
1 INJECTION, SOLUTION EPIDURAL; INFILTRATION; INTRACAUDAL; PERINEURAL ONCE
Status: ACTIVE | OUTPATIENT
Start: 2022-12-05

## 2022-12-05 RX ORDER — EPHEDRINE SULFATE 50 MG/ML
INJECTION, SOLUTION INTRAVENOUS
Status: DISCONTINUED | OUTPATIENT
Start: 2022-12-05 | End: 2022-12-05

## 2022-12-05 RX ORDER — PHENYLEPHRINE HYDROCHLORIDE 10 MG/ML
INJECTION INTRAVENOUS
Status: DISCONTINUED | OUTPATIENT
Start: 2022-12-05 | End: 2022-12-05

## 2022-12-05 RX ORDER — CEFAZOLIN SODIUM 1 G/50ML
1 SOLUTION INTRAVENOUS
Status: CANCELLED | OUTPATIENT
Start: 2022-12-05 | End: 2022-12-06

## 2022-12-05 RX ADMIN — FENTANYL CITRATE 25 MCG: 50 INJECTION, SOLUTION INTRAMUSCULAR; INTRAVENOUS at 01:12

## 2022-12-05 RX ADMIN — MIDAZOLAM HYDROCHLORIDE 1 MG: 1 INJECTION, SOLUTION INTRAMUSCULAR; INTRAVENOUS at 10:12

## 2022-12-05 RX ADMIN — SODIUM CHLORIDE, SODIUM GLUCONATE, SODIUM ACETATE, POTASSIUM CHLORIDE, MAGNESIUM CHLORIDE, SODIUM PHOSPHATE, DIBASIC, AND POTASSIUM PHOSPHATE: .53; .5; .37; .037; .03; .012; .00082 INJECTION, SOLUTION INTRAVENOUS at 12:12

## 2022-12-05 RX ADMIN — FENTANYL CITRATE 25 MCG: 50 INJECTION, SOLUTION INTRAMUSCULAR; INTRAVENOUS at 11:12

## 2022-12-05 RX ADMIN — BUPIVACAINE HYDROCHLORIDE 2 ML: 7.5 INJECTION, SOLUTION EPIDURAL; RETROBULBAR at 11:12

## 2022-12-05 RX ADMIN — TRANEXAMIC ACID 1000 MG: 100 INJECTION, SOLUTION INTRAVENOUS at 11:12

## 2022-12-05 RX ADMIN — ONDANSETRON 4 MG: 2 INJECTION INTRAMUSCULAR; INTRAVENOUS at 10:12

## 2022-12-05 RX ADMIN — ACETAMINOPHEN 1000 MG: 10 INJECTION, SOLUTION INTRAVENOUS at 11:12

## 2022-12-05 RX ADMIN — ONDANSETRON 4 MG: 2 INJECTION INTRAMUSCULAR; INTRAVENOUS at 01:12

## 2022-12-05 RX ADMIN — PROPOFOL 50 MCG/KG/MIN: 10 INJECTION, EMULSION INTRAVENOUS at 11:12

## 2022-12-05 RX ADMIN — GLYCOPYRROLATE 0.2 MG: 0.2 INJECTION, SOLUTION INTRAMUSCULAR; INTRAVITREAL at 11:12

## 2022-12-05 RX ADMIN — SODIUM CHLORIDE, SODIUM GLUCONATE, SODIUM ACETATE, POTASSIUM CHLORIDE, MAGNESIUM CHLORIDE, SODIUM PHOSPHATE, DIBASIC, AND POTASSIUM PHOSPHATE: .53; .5; .37; .037; .03; .012; .00082 INJECTION, SOLUTION INTRAVENOUS at 09:12

## 2022-12-05 RX ADMIN — EPHEDRINE SULFATE 10 MG: 50 INJECTION, SOLUTION INTRAMUSCULAR; INTRAVENOUS; SUBCUTANEOUS at 11:12

## 2022-12-05 RX ADMIN — PHENYLEPHRINE HYDROCHLORIDE 100 MCG: 10 INJECTION INTRAVENOUS at 11:12

## 2022-12-05 RX ADMIN — PHENYLEPHRINE HYDROCHLORIDE 50 MCG: 10 INJECTION INTRAVENOUS at 12:12

## 2022-12-05 RX ADMIN — CEFAZOLIN SODIUM 2 G: 2 SOLUTION INTRAVENOUS at 11:12

## 2022-12-05 RX ADMIN — FENTANYL CITRATE 25 MCG: 50 INJECTION, SOLUTION INTRAMUSCULAR; INTRAVENOUS at 12:12

## 2022-12-05 RX ADMIN — EPHEDRINE SULFATE 5 MG: 50 INJECTION, SOLUTION INTRAMUSCULAR; INTRAVENOUS; SUBCUTANEOUS at 12:12

## 2022-12-05 NOTE — OP NOTE
[facility]  Surgery Department  Operative Note    SUMMARY     Date of Procedure: 12/5/2022     Procedure:  Left total hip arthroplasty   Surgeon(s) and Role:     * Prosper Hodge MD - Primary    Assisting Surgeon:  Tanika    Pre-Operative Diagnosis: Primary osteoarthritis of left hip [M16.12]    Post-Operative Diagnosis: Post-Op Diagnosis Codes:     * Primary osteoarthritis of left hip [M16.12]    Anesthesia: Spinal    Intraoperative Findings:  Bone-on-bone arthritis left hip    Description of the Findings of the Procedure:  Patient was placed on the operative table in supine position.  She was then turned to the lateral decubitus position.  She was prepped and draped in the usual sterile manner for surgery.  Posterior lateral approach was undertaken to the hip and carried down sharply through the skin.  The deep fascia was incised in line with its fibers.  The sciatic nerve was protected.  The Charnley retractors were placed.  The short external rotators were taken down and tagged for later reattachment.  The capsule was split and the hip was dislocated.  There was a large effusion.  Preliminary head cut was made in the head was removed.  We now used the cookie cutter then the lateralize her and then the canal finer and then the reamers we reamed up to a 5.  That gave us good chatter.  We now broached to a 5 and that gave us good fit and fill.  We now turned our attention to the acetabulum retractors were placed anteriorly and posteriorly.  The foveal contents were cleaned and we began reaming.  We reamed up to a 51 mm Reamer which gave us excellent bleeding bone.  A 52 mm cup was tapped into position and we would perfect Press-Fit.  The liner was tapped into position we turned our attention back to the stem.  We dropped her trial stem into position and trialed.  The high offset +5 head gave us symmetric leg lengths perfect stability.  We removed the trial components and tapped the actual  components into position.  The construct trialed beautifully we pulsed and irrigated.  We checked the sciatic nerve.  It was in good condition and position.  We removed the Charnley retractors.  We had closed the deep capsule with 2-0 FiberWire and the short external rotators with 2-0 FiberWire.  We irrigated again and closed the deep fascia with a running 2. Strattice fixed.  We irrigated and closed the subcu with 2-0 Vicryl and the skin with 4-0 Monocryl.  Sterile dressings were applied and the patient was noted to be in stable condition pending an x-ray and neurovascular check    Complications: No    Estimated Blood Loss (EBL): * No values recorded between 12/5/2022 11:37 AM and 12/5/2022 12:28 PM *           Implants:   Implant Name Type Inv. Item Serial No.  Lot No. LRB No. Used Action   CUP ACET PINN 10D 32MM 52MM - [id number]  CUP ACET PINN 10D 32MM 52MM  [employer] [id number] Left 1 Implanted   LINER ACET PINN [id number] 10D+4X52M - [id number]  LINER ACET PINN SZ36 10D+4X52M  [employer] [id number] Left 1 Implanted   HEAD CERAMAX 12/14 +5 36MM - [id number]  HEAD CERAMAX 12/14 +5 36MM  [employer] [id number] Left 1 Implanted   STEM FEM SZ 5 HIGH OFFSET - [id number]  STEM FEM SZ 5 HIGH OFFSET  [employer] [id number] Left 1 Implanted       Specimens:   Specimen (24h ago, onward)      None                    Condition: Good    Disposition: PACU - hemodynamically stable.                Discharge Note    SUMMARY     Admit Date: 12/5/2022    Discharge Date and Time:  12/05/2022 12:28 PM    Hospital Course (synopsis of major diagnoses, care, treatment, and services provided during the course of the hospital stay): Uneventful      Final Diagnosis: Post-Op Diagnosis Codes:     * Primary osteoarthritis of left hip [M16.12]    Disposition: Home or Self Care    Follow Up/Patient Instructions:     Medications:  Reconciled Home Medications:   Current Discharge Medication List        CONTINUE these medications which have NOT CHANGED     Details   amoxicillin (AMOXIL) 500 MG capsule Take 1 capsule (500 mg total) by mouth every 8 (eight) hours. for 10 days  Qty: 30 capsule, Refills: 0    Associated Diagnoses: Bacterial sinusitis      ergocalciferol (ERGOCALCIFEROL) 50,000 unit Cap Take 10,000 Units by mouth once daily.      esomeprazole magnesium (NEXIUM ORAL)       ibuprofen (ADVIL,MOTRIN) 200 MG tablet Take 1,000 mg by mouth every 6 (six) hours as needed for Pain.      promethazine-dextromethorphan (PROMETHAZINE-DM) 6.25-15 mg/5 mL Syrp Take 5 mLs by mouth every 4 to 6 hours as needed (Cough).  Qty: 118 mL, Refills: 0    Associated Diagnoses: Bacterial sinusitis      rOPINIRole (REQUIP) 1 MG tablet as needed.       sertraline (ZOLOFT) 100 MG tablet Take 1 tablet (100 mg total) by mouth once daily.  Qty: 30 tablet, Refills: 5    Associated Diagnoses: Moderate episode of recurrent major depressive disorder      SYNTHROID 125 mcg tablet Take 112 mcg by mouth once daily.      acyclovir (ZOVIRAX) 400 MG tablet acyclovir 400 mg tablet   TAKE 1 TABLET BY MOUTH TWICE DAILY      aspirin (ECOTRIN) 81 MG EC tablet Take 1 tablet (81 mg total) by mouth every 12 (twelve) hours.  Qty: 60 tablet, Refills: 0    Associated Diagnoses: Primary osteoarthritis of left hip      celecoxib (CELEBREX) 200 MG capsule Take 1 capsule (200 mg total) by mouth 2 (two) times daily.  Qty: 60 capsule, Refills: 0    Associated Diagnoses: Primary osteoarthritis of left hip      docusate sodium (COLACE) 100 MG capsule Take 1 capsule (100 mg total) by mouth 2 (two) times daily.  Qty: 60 capsule, Refills: 0    Associated Diagnoses: Primary osteoarthritis of left hip      gabapentin (NEURONTIN) 300 MG capsule Take 1 capsule (300 mg total) by mouth 2 (two) times daily.  Qty: 60 capsule, Refills: 0    Associated Diagnoses: Primary osteoarthritis of left hip      HYDROcodone-acetaminophen (NORCO) 7.5-325 mg per tablet Take 1 tablet by mouth every 6 (six) hours as needed for Pain.  Qty: 28  tablet, Refills: 0    Comments: Quantity prescribed more than 7 day supply? No  Associated Diagnoses: Primary osteoarthritis of left hip           Discharge Procedure Orders   Diet general     Call MD for:  temperature >100.4     Call MD for:  persistent nausea and vomiting     Call MD for:  severe uncontrolled pain     Call MD for:  difficulty breathing, headache or visual disturbances     Call MD for:  redness, tenderness, or signs of infection (pain, swelling, redness, odor or green/yellow discharge around incision site)     Call MD for:  hives     Call MD for:  persistent dizziness or light-headedness     Call MD for:  extreme fatigue      Follow-up Information       [facility]. Call in 1 day(s).    Specialties: Physical Therapy, Home Health Services  Contact information:  [address] [place] [address]  [address]  [place] [address] [phone]

## 2022-12-05 NOTE — PT/OT/SLP EVAL
Occupational Therapy   Evaluation and Discharge Note    Name: Gloria Manuel  MRN: [id number]  Admitting Diagnosis: <principal problem not specified>  Recent Surgery: Procedure(s) (LRB):  ARTHROPLASTY, HIP, LEFT (Left) Day of Surgery    Recommendations:     Discharge Recommendations: home  Discharge Equipment Recommendations: Pt owns a bedside commode and a shower chair.  Barriers to discharge:  None    Assessment:     Gloria Manuel is a 67 y.o. female with a medical diagnosis of <principal problem not specified>. At this time, patient does not require further acute OT services.     Pt was agreeable to participate in OT. Pt required CGA and verbal cues for hand placement with sit <> stand transfer and chair <> BSC transfer with RW. Pt required set up A/SBA to don a pullover shirt. Pt declined education of assistive devices including a reacher, dressing stick, sock aid and shoe horn and stated she already knows how to use them. Pt required max A to don her undergarment and pants. Pt required min A to don her shoes. Pt was given education on L posterior hip precautions including no hip flexion greater than 90 degrees, no IR of hip and no adduction of hip. Pt recalled 1/3 precautions prior to ADLs. Pt recalled 3/3 precautions after further instruction of precautions during ADLs. Pt is scheduled for discharge today and would benefit from skilled home health PT services to increase her independence with ADLs and functional transfer skills to return to her PLOF.     Plan:     During this hospitalization, patient does not require further acute OT services.  Please re-consult if situation changes.      Subjective     Chief Complaint: L hip pain  Patient/Family Comments/goals: To go home    Occupational Profile:  Living Environment: Pt lives with her .  Previous level of function: Independent with ADLs  Assistance upon Discharge: Pt will have assistance from her .    Pain/Comfort:  Location -  Side 1: Left  Location 1: hip  Pain Addressed 1: Reposition, Nurse notified    Patients cultural, spiritual, Jainism conflicts given the current situation: yes    Objective:     Communicated with: nurse prior to session.  Patient found up in chair upon OT entry to room.    General Precautions: Standard  Orthopedic Precautions: L LE weight bearing as tolerated, L LE posterior precautions  Braces: hip abduction brace  Respiratory Status: Room air     Occupational Performance:    Functional Mobility/Transfers:  Patient completed Sit <> Stand Transfer with contact guard assistance with rolling walker   Patient completed Toilet Transfer Stand Pivot technique with contact guard assistance with rolling walker  Functional Mobility: Pt ambulated 15 ft with RW with nurse following with a chair.     Activities of Daily Living:  Upper Body Dressing: set up A to don a pullover shirt  Lower Body Dressing: maximal assistance  Toileting: moderate assistance for clothing management    Cognitive/Visual Perceptual:  Cognitive/Psychosocial Skills:  -       Oriented to: Person, Place, Time, and Situation   -       Follows Commands/attention: Follows multistep commands  -       Communication: clear/fluent    Physical Exam:  Upper Extremity Range of Motion:  -       Right Upper Extremity: WFL  -       Left Upper Extremity: WFL  Upper Extremity Strength: -       Right Upper Extremity: WFL  -       Left Upper Extremity: WFL    AMPAC 6 Click ADL:  AMPAC Total Score: 18    Treatment & Education:  Hip precautions education   Education on use of DME including a raised toilet seat and shower chair to maintain precautions during functional transfers   Functional transfer training with use of RW   OT ed patient on safety with walker use for functional mobility with cues for hand placement and sequencing       Patient left up in chair with  patient's  and nurse present.    GOALS:   Multidisciplinary Problems       Occupational Therapy  Goals       Not on file                    History:     Past Medical History:   Diagnosis Date    Anxiety     Arthritis     Back pain     Cancer     thyroid ca and skin ca    Depression     Femoral acetabular impingement 11/06/2017    GERD (gastroesophageal reflux disease)     Migraines     MVP (mitral valve prolapse)     RLS (restless legs syndrome)     Thyroid disease     thyroidectomy    Wears dentures     UPPER AND LOWER BRIDGE    Wears glasses          Past Surgical History:   Procedure Laterality Date    BACK SURGERY      nerve stimulator    COLONOSCOPY      COLONOSCOPY N/A 7/19/2022    Procedure: COLONOSCOPY;  Surgeon: Kuldeep Gill MD;  Location: [facility];  Service: Endoscopy;  Laterality: N/A;    HIP ARTHROPLASTY Right 6/25/2018    Procedure: ARTHROPLASTY, HIP;  Surgeon: Prosper Hodge MD;  Location: [facility];  Service: Orthopedics;  Laterality: Right;  william    HYSTERECTOMY      implanted spinal cord stimulator      NONFUNCTIONING X 15 YEARS    SACRAL NERVE STIMULATOR PLACEMENT Left     SKIN CANCER EXCISION      THYROIDECTOMY N/A 3/9/2020    Procedure: TOTAL THYROIDECTOMY;  Surgeon: Kelsie Rodgers MD;  Location: [facility];  Service: General;  Laterality: N/A;    TONSILLECTOMY         Time Tracking:     OT Date of Treatment: 12/05/22  OT Start Time: 1510  OT Stop Time: 1545  OT Total Time (min): 35 min    Billable Minutes:Evaluation 15  Self Care/Home Management 20    12/5/2022

## 2022-12-05 NOTE — PLAN OF CARE
Problem: Physical Therapy  Goal: Physical Therapy Goal  Description: Goals to be met by: 2022     Patient will increase functional independence with mobility by performin. Supine to sit with Contact Guard Assistance  2. Sit to stand transfer with Contact Guard Assistance  3. Bed to chair transfer with Contact Guard Assistance using Rolling Walker  4. Gait  x 250 feet with Contact Guard Assistance using Rolling Walker.   5. Lower extremity exercise program x20 reps     LLE WBAT with posterior hip precautions    2022 1543 by Janel Dixon PT  Outcome: Ongoing, Progressing

## 2022-12-05 NOTE — DISCHARGE INSTRUCTIONS
"Discharge Instructions: After Your Surgery/Procedure  Youve just had surgery. During surgery you were given medicine called anesthesia to keep you relaxed and free of pain. After surgery you may have some pain or nausea. This is common. Here are some tips for feeling better and getting well after surgery.     Stay on schedule with your medication.   Going home  Your doctor or nurse will show you how to take care of yourself when you go home. He or she will also answer your questions. Have an adult family member or friend drive you home.      For your safety we recommend these precaution for the first 24 hours after your procedure:  Do not drive or use heavy equipment.  Do not make important decisions or sign legal papers.  Do not drink alcohol.  Have someone stay with you, if needed. He or she can watch for problems and help keep you safe.  Your concentration, balance, coordination, and judgement may be impaired for many hours after anesthesia.  Use caution when ambulating or standing up.     You may feel weak and "washed out" after anesthesia and surgery.      Subtle residual effects of general anesthesia or sedation with regional / local anesthesia can last more than 24 hours.  Rest for the remainder of the day or longer if your Doctor/Surgeon has advised you to do so.  Although you may feel normal within the first 24 hours, your reflexes and mental ability may be impaired without you realizing it.  You may feel dizzy, lightheaded or sleepy for 24 hours or longer.      Be sure to go to all follow-up visits with your doctor. And rest after your surgery for as long as your doctor tells you to.  Coping with pain  If you have pain after surgery, pain medicine will help you feel better. Take it as told, before pain becomes severe. Also, ask your doctor or pharmacist about other ways to control pain. This might be with heat, ice, or relaxation. And follow any other instructions your surgeon or nurse gives you.  Tips " for taking pain medicine  To get the best relief possible, remember these points:  Pain medicines can upset your stomach. Taking them with a little food may help.  Most pain relievers taken by mouth need at least 20 to 30 minutes to start to work.  Taking medicine on a schedule can help you remember to take it. Try to time your medicine so that you can take it before starting an activity. This might be before you get dressed, go for a walk, or sit down for dinner.  Constipation is a common side effect of pain medicines. Call your doctor before taking any medicines such as laxatives or stool softeners to help ease constipation. Also ask if you should skip any foods. Drinking lots of fluids and eating foods such as fruits and vegetables that are high in fiber can also help. Remember, do not take laxatives unless your surgeon has prescribed them.  Drinking alcohol and taking pain medicine can cause dizziness and slow your breathing. It can even be deadly. Do not drink alcohol while taking pain medicine.  Pain medicine can make you react more slowly to things. Do not drive or run machinery while taking pain medicine.  Your health care provider may tell you to take acetaminophen to help ease your pain. Ask him or her how much you are supposed to take each day. Acetaminophen or other pain relievers may interact with your prescription medicines or other over-the-counter (OTC) drugs. Some prescription medicines have acetaminophen and other ingredients. Using both prescription and OTC acetaminophen for pain can cause you to overdose. Read the labels on your OTC medicines with care. This will help you to clearly know the list of ingredients, how much to take, and any warnings. It may also help you not take too much acetaminophen. If you have questions or do not understand the information, ask your pharmacist or health care provider to explain it to you before you take the OTC medicine.  Managing nausea  Some people have an  upset stomach after surgery. This is often because of anesthesia, pain, or pain medicine, or the stress of surgery. These tips will help you handle nausea and eat healthy foods as you get better. If you were on a special food plan before surgery, ask your doctor if you should follow it while you get better. These tips may help:  Do not push yourself to eat. Your body will tell you when to eat and how much.  Start off with clear liquids and soup. They are easier to digest.  Next try semi-solid foods, such as mashed potatoes, applesauce, and gelatin, as you feel ready.  Slowly move to solid foods. Dont eat fatty, rich, or spicy foods at first.  Do not force yourself to have 3 large meals a day. Instead eat smaller amounts more often.  Take pain medicines with a small amount of solid food, such as crackers or toast, to avoid nausea.     Call your surgeon if  You still have pain an hour after taking medicine. The medicine may not be strong enough.  You feel too sleepy, dizzy, or groggy. The medicine may be too strong.  You have side effects like nausea, vomiting, or skin changes, such as rash, itching, or hives.       If you have obstructive sleep apnea  You were given anesthesia medicine during surgery to keep you comfortable and free of pain. After surgery, you may have more apnea spells because of this medicine and other medicines you were given. The spells may last longer than usual.   At home:  Keep using the continuous positive airway pressure (CPAP) device when you sleep. Unless your health care provider tells you not to, use it when you sleep, day or night. CPAP is a common device used to treat obstructive sleep apnea.  Talk with your provider before taking any pain medicine, muscle relaxants, or sedatives. Your provider will tell you about the possible dangers of taking these medicines.  © 7170-6736 [employer]. [address] [place] [address], [place] [address]. All rights reserved. This information is  not intended as a substitute for professional medical care. Always follow your healthcare professional's instructions.       Using an Incentive Spirometer    An incentive spirometer is a device that helps you do deep breathing exercises. These exercises expand your lungs, aid in circulation, and help prevent pneumonia. Deep breathing exercises also help you breathe better and improve the function of your lungs by:  Keeping your lungs clear  Strengthening your breathing muscles  Helping prevent respiratory complications or problems  The incentive spirometer gives you a way to take an active part in recover. A nurse or therapist will teach you breathing exercises. To do these exercises, you will breathe in through your mouth and not your nose. The incentive spirometer only works correctly if you breathe in through your mouth.  Steps to clear lungs  Step 1. Exhale normally. Then, inhale normally.  Relax and breathe out.  Step 2. Place your lips tightly around the mouthpiece.  Make sure the device is upright and not tilted.  Step 3. Inhale as much air as you can through the mouthpiece (don't breath through your nose).  Inhale slowly and deeply.  Hold your breath long enough to keep the balls or disk raised for at least 3 to 5 seconds, or as instructed by your healthcare provider.  Some spirometers have an indicator to let you know that you are breathing in too fast. If the indicator goes off, breathe in more slowly.  Step 4. Repeat the exercise regularly.  Do this exercise every hour while you're awake, or as instructed by your healthcare provider.  If you were taught deep breathing and coughing exercises, do them regularly as instructed by your healthcare provider.          Post op instructions for prevention of DVT  What is deep vein thrombosis?  Deep vein thrombosis (DVT) is the medical term for blood clots in the deep veins of the leg.  These blood clots can be dangerous.  A DVT can block a blood vessel and keep  blood from getting where it needs to go.  Another problem is that the clot can travel to other parts of the body such as the lungs.  A clot that travels to the lungs is called a pulmonary embolus (PE) and can cause serious problems with breathing which can lead to death.  Am I at risk for DVT/PE?  If you are not very active, you are at risk of DVT.  Anyone confined to bed, sitting for long periods of time, recovering from surgery, etc. increases the risk of DVT.  Other risk factors are cancer diagnosis, certain medications, estrogen replacement in any form,older age, obesity, pregnancy, smoking, history of clotting disorders, and dehydration.  How will I know if I have a DVT?  Swelling in the lower leg  Pain  Warmth, redness, hardness or bulging of the vein  If you have any of these symptoms, call your doctors office right away.  Some people will not have any symptoms until the clot moves to the lungs.  What are the symptoms of a PE?  Panting, shortness of breath, or trouble breathing  Sharp, knife-like chest pain when you breathe  Coughing or coughing up blood  Rapid heartbeat  If you have any of these symptoms or get worse quickly, call 911 for emergency treatment.  How can I prevent a DVT?  Avoid long periods of inactivity and dont cross your legs--get up and walk around every hour or so.  Stay active--walking after surgery is highly encouraged.  This means you should get out of the house and walk in the neighborhood.  Going up and down stairs will not impair healing (unless advised against such activity by your doctor).    Drink plenty of noncaffeinated, nonalcoholic fluids each day to prevent dehydration.  Wear special support stockings, if they have been advised by your doctor.  If you travel, stop at least once an hour and walk around.  Avoid smoking (assistance with stopping is available through your healthcare provider)  Always notify your doctor if you are not able to follow the post operative  instructions that are given to you at the time of discharge.  It may be necessary to prescribe one of the medications available to prevent DVT.       We hope your stay was comfortable as you heal now, mend and rest.    For we have enjoyed taking care of you by giving you our best.    And as you get better, by regaining your health and strength;   We count it as a privilege to have served you and hope your time at [facility] was well spent.      Thank  You!!!

## 2022-12-05 NOTE — ANESTHESIA POSTPROCEDURE EVALUATION
Anesthesia Post Evaluation    Patient: Gloria Manuel    Procedure(s) Performed: Procedure(s) (LRB):  ARTHROPLASTY, HIP, LEFT (Left)    Final Anesthesia Type: general      Patient location during evaluation: PACU  Patient participation: Yes- Able to Participate  Level of consciousness: awake and alert and oriented  Post-procedure vital signs: reviewed and stable  Pain management: adequate  Airway patency: patent    PONV status at discharge: No PONV  Anesthetic complications: no      Cardiovascular status: blood pressure returned to baseline  Respiratory status: unassisted, spontaneous ventilation and room air  Hydration status: euvolemic  Follow-up not needed.          Vitals Value Taken Time   /58 12/05/22 1320   Temp 36.4 °C (97.5 °F) 12/05/22 1245   Pulse 54 12/05/22 1322   Resp 15 12/05/22 1322   SpO2 100 % 12/05/22 1322   Vitals shown include unvalidated device data.      No case tracking events are documented in the log.      Pain/Ray Score: Ray Score: 9 (12/5/2022  1:15 PM)

## 2022-12-05 NOTE — TRANSFER OF CARE
"Anesthesia Transfer of Care Note    Patient: Gloria Manuel    Procedure(s) Performed: Procedure(s) (LRB):  ARTHROPLASTY, HIP, LEFT (Left)    Patient location: PACU    Anesthesia Type: spinal and MAC    Transport from OR: Transported from OR on room air with adequate spontaneous ventilation    Post pain: adequate analgesia    Post assessment: no apparent anesthetic complications    Post vital signs: stable    Level of consciousness: awake and lethargic    Nausea/Vomiting: no nausea/vomiting    Complications: none    Transfer of care protocol was followed      Last vitals:   Visit Vitals  BP (!) 142/75 (BP Location: Left arm, Patient Position: Lying)   Pulse 74   Temp 36.3 °C (97.4 °F) (Temporal)   Resp 19   Ht 5' 5" (1.651 m)   Wt 89.1 kg (196 lb 6.9 oz)   SpO2 98%   Breastfeeding No   BMI 32.69 kg/m²     "

## 2022-12-05 NOTE — ANESTHESIA PROCEDURE NOTES
Spinal    Diagnosis: DDD LEFT HIP---ARTHROPLASTY, HIP, LEFT (Left: Hip)  Patient location during procedure: OR  Start time: 12/5/2022 10:55 AM  Timeout: 12/5/2022 10:55 AM  End time: 12/5/2022 11:00 AM    Staffing  Authorizing Provider: Clayton Lujan MD  Performing Provider: Clayton Lujan MD    Preanesthetic Checklist  Completed: patient identified, IV checked, site marked, risks and benefits discussed, surgical consent, monitors and equipment checked, pre-op evaluation and timeout performed  Spinal Block  Patient position: sitting  Prep: ChloraPrep  Patient monitoring: heart rate, cardiac monitor, continuous pulse ox, continuous capnometry and frequent blood pressure checks  Approach: midline  Location: L3-4  Injection technique: single shot  CSF Fluid: clear free-flowing CSF  Needle  Needle type: pencil-tip   Needle gauge: 25 G  Needle length: 3.5 in  Additional Documentation: incremental injection, negative aspiration for heme and no paresthesia on injection  Needle localization: anatomical landmarks  Assessment  Sensory level: T10  Ease of block: easy  Patient's tolerance of the procedure: no complaints  Medications:    Medications: bupivacaine (pf) (MARCAINE) injection 0.75% - Intraspinal   2 mL - 12/5/2022 11:00:00 AM

## 2022-12-05 NOTE — ANESTHESIA PREPROCEDURE EVALUATION
12/05/2022  Gloria Manuel is a 67 y.o., female.      Pre-op Assessment    I have reviewed the Patient Summary Reports.     I have reviewed the Nursing Notes. I have reviewed the NPO Status.   I have reviewed the Medications.     Review of Systems  Anesthesia Hx:  No problems with previous Anesthesia Denies Hx of Anesthetic complications    Social:  Non-Smoker    Cardiovascular:   Denies Hypertension.  Denies MI.  Denies CAD.    Denies CABG/stent.   Denies Angina.    Pulmonary:   Denies COPD.  Denies Asthma.  Denies Recent URI.    Renal/:   Denies Chronic Renal Disease.     Hepatic/GI:   Denies GERD. Denies Liver Disease.    Neurological:   Denies TIA. Denies CVA. Denies Seizures.    Endocrine:   Denies Diabetes. Denies Hypothyroidism.    Psych:   Denies Psychiatric History.          Physical Exam  General: Well nourished, Cooperative, Alert and Oriented    Airway:  Mallampati: II / II  Mouth Opening: Normal  TM Distance: 4 - 6 cm  Tongue: Normal    Dental:  Intact    Chest/Lungs:  Clear to auscultation, Normal Respiratory Rate    Heart:  Rate: Normal  Rhythm: Regular Rhythm  Sounds: Normal        Anesthesia Plan  Type of Anesthesia, risks & benefits discussed:    Anesthesia Type: Gen Natural Airway  Intra-op Monitoring Plan: Standard ASA Monitors  Induction:  IV  Informed Consent: Informed consent signed with the Patient and all parties understand the risks and agree with anesthesia plan.  All questions answered.   ASA Score: 3    Ready For Surgery From Anesthesia Perspective.     .

## 2022-12-05 NOTE — PT/OT/SLP EVAL
Physical Therapy Evaluation    Patient Name:  Gloria Manuel   MRN:  [id number]    Recommendations:     Discharge Recommendations: home health PT   Discharge Equipment Recommendations: none (has RW at home)   Barriers to discharge: None    Assessment:     Gloria Manuel is a 67 y.o. female admitted with a medical diagnosis of <principal problem not specified>.  She presents with the following impairments/functional limitations: impaired endurance, weakness, impaired sensation, impaired functional mobility, gait instability, decreased lower extremity function, orthopedic precautions .    Pt seen at old ICU- alert and agreeable to PT- pt stated of tingling in feet and numbness of bottom. Pt educated on L posterior hip precautions and use of abd pillow. Pt completed thera ex in supine. Min assist to sit EOB. Min/mod assist with standing and few steps to chair- pt anxious due to LE's hypoesthesia. Pt encouraged and tolerated ambulation 150ft with RW with slow sanya and another person following with chair. Pt requiring 2 seated rests. Spouse at bedside and attentive to care.  Pt to benefit from HHPT.    Rehab Prognosis: Good; patient would benefit from acute skilled PT services to address these deficits and reach maximum level of function.    Recent Surgery: Procedure(s) (LRB):  ARTHROPLASTY, HIP, LEFT (Left) Day of Surgery    Plan:     During this hospitalization, patient to be seen daily to address the identified rehab impairments via gait training, therapeutic activities, therapeutic exercises and progress toward the following goals:    Plan of Care Expires:  12/30/22    Subjective   Pt stated of previous R DARNELL 4 years ago  Stated had a lot of pain in LH prior to sx  Chief Complaint: tingling both feet and buttock numbness  Patient/Family Comments/goals: get well  Pain/Comfort:  Pain Rating 1: 0/10 (Simultaneous filing. User may not have seen previous data.)    Patients cultural, spiritual, Uatsdin  conflicts given the current situation:      Living Environment:  Home with spouse with 1 threshold step  Prior to admission, patients level of function was ambulatory but limited by pain.  Equipment used at home: none.  DME owned (not currently used): rolling walker.  Upon discharge, patient will have assistance from family.    Objective:     Communicated with nurse Larry prior to session.  Patient found HOB elevated with hip abduction pillow (Simultaneous filing. User may not have seen previous data.)  upon PT entry to room.    General Precautions: Standard, fall (Simultaneous filing. User may not have seen previous data.)  Orthopedic Precautions:LLE weight bearing as tolerated, LLE posterior precautions (Simultaneous filing. User may not have seen previous data.)   Braces: N/A (Simultaneous filing. User may not have seen previous data.)  Respiratory Status: Room air    Exams:  Postural Exam:  Patient presented with the following abnormalities:    -       Rounded shoulders  -       Forward head  -       BMI 32  RLE ROM: WFL  RLE Strength: WFL  LLE ROM: Deficits: within L posterior hip precautions  LLE Strength: Deficits: 3/5    Functional Mobility:  Bed Mobility:     Scooting: minimum assistance  Supine to Sit: minimum assistance  Transfers:     Sit to Stand:  minimum assistance with rolling walker  Bed to Chair: minimum assistance with  rolling walker  using  Stand Pivot  Gait: 150ft with RW min assist with slow and guarded sanya  Chair following with 2 seated rests      AM-PAC 6 CLICK MOBILITY  Total Score:16       Treatment & Education:  Patient was educated on the importance of OOB activity and functional mobility to negate negative effects of prolonged bed rest during hospitalization, safe transfers and ambulation, and D/C planning   Thera ex with AP,QS/GS bilaterally and HS,SLR RLE  Educated on L posterior hip precautions and use of abd pillow    Patient left up in chair with all lines intact, call  button in reach, and nurse Kendy notified. Spouse at bedside    GOALS:   Multidisciplinary Problems       Physical Therapy Goals          Problem: Physical Therapy    Goal Priority Disciplines Outcome Goal Variances Interventions   Physical Therapy Goal     PT, PT/OT Ongoing, Progressing     Description: Goals to be met by: 2022     Patient will increase functional independence with mobility by performin. Supine to sit with Contact Guard Assistance  2. Sit to stand transfer with Contact Guard Assistance  3. Bed to chair transfer with Contact Guard Assistance using Rolling Walker  4. Gait  x 250 feet with Contact Guard Assistance using Rolling Walker.   5. Lower extremity exercise program x20 reps     LLE WBAT with posterior hip precautions                         History:     Past Medical History:   Diagnosis Date    Anxiety     Arthritis     Back pain     Cancer     thyroid ca and skin ca    Depression     Femoral acetabular impingement 2017    GERD (gastroesophageal reflux disease)     Migraines     MVP (mitral valve prolapse)     RLS (restless legs syndrome)     Thyroid disease     thyroidectomy    Wears dentures     UPPER AND LOWER BRIDGE    Wears glasses        Past Surgical History:   Procedure Laterality Date    BACK SURGERY      nerve stimulator    COLONOSCOPY      COLONOSCOPY N/A 2022    Procedure: COLONOSCOPY;  Surgeon: Kuldeep Gill MD;  Location: [facility];  Service: Endoscopy;  Laterality: N/A;    HIP ARTHROPLASTY Right 2018    Procedure: ARTHROPLASTY, HIP;  Surgeon: Prosper Hodge MD;  Location: [facility] OR;  Service: Orthopedics;  Laterality: Right;  william    HYSTERECTOMY      implanted spinal cord stimulator      NONFUNCTIONING X 15 YEARS    SACRAL NERVE STIMULATOR PLACEMENT Left     SKIN CANCER EXCISION      THYROIDECTOMY N/A 3/9/2020    Procedure: TOTAL THYROIDECTOMY;  Surgeon: Kelsie Rodgers MD;  Location: [facility] OR;  Service: General;  Laterality: N/A;     TONSILLECTOMY         Time Tracking:     PT Received On: 12/05/22  PT Start Time: 1414     PT Stop Time: 1502  PT Total Time (min): 48 min     Billable Minutes: Evaluation 10, Gait Training 28, and Therapeutic Exercise 10      12/05/2022

## 2022-12-05 NOTE — PLAN OF CARE
Problem: Physical Therapy  Goal: Physical Therapy Goal  Description: Goals to be met by: 2022     Patient will increase functional independence with mobility by performin. Supine to sit with Contact Guard Assistance  2. Sit to stand transfer with Contact Guard Assistance  3. Bed to chair transfer with Contact Guard Assistance using Rolling Walker  4. Gait  x 250 feet with Contact Guard Assistance using Rolling Walker.   5. Lower extremity exercise program x20 reps     LLE WBAT with posterior hip precautions    Outcome: Ongoing, Progressing   PT eval and treat- gait with RW 150ft with 2 seated rests. Reviewed on L posterior hip precautions. Pt c/o tingling both feet/hypoesthesia of buttocks

## 2022-12-05 NOTE — PLAN OF CARE
Pt awake, alert, oriented. Vital signs stable. Cleared for discharge by PT and OT. Pt reports having all necessary DME. Discharge instructions reviewed with pt and pt's spouse. Pt and pt's spouse verbalized understanding. IV removed, catheter intact. Dressing to L hip clean, dry, and intact. All belongings returned to patient. Pt transferred to car via wheelchair. Safety maintained.

## 2022-12-06 ENCOUNTER — OFFICE VISIT (OUTPATIENT)
Dept: ORTHOPEDICS | Facility: CLINIC | Age: 67
End: 2022-12-06
Payer: MEDICARE

## 2022-12-06 VITALS — WEIGHT: 196 LBS | HEIGHT: 65 IN | BODY MASS INDEX: 32.65 KG/M2

## 2022-12-06 DIAGNOSIS — Z96.642 STATUS POST TOTAL HIP REPLACEMENT, LEFT: Primary | ICD-10-CM

## 2022-12-06 PROCEDURE — G0180 MD CERTIFICATION HHA PATIENT: HCPCS | Mod: ,,, | Performed by: ORTHOPAEDIC SURGERY

## 2022-12-06 PROCEDURE — 99024 POSTOP FOLLOW-UP VISIT: CPT | Mod: S$GLB,POP,, | Performed by: ORTHOPAEDIC SURGERY

## 2022-12-06 PROCEDURE — G0180 PR HOME HEALTH MD CERTIFICATION: ICD-10-PCS | Mod: ,,, | Performed by: ORTHOPAEDIC SURGERY

## 2022-12-06 PROCEDURE — 99024 PR POST-OP FOLLOW-UP VISIT: ICD-10-PCS | Mod: S$GLB,POP,, | Performed by: ORTHOPAEDIC SURGERY

## 2022-12-06 NOTE — PROGRESS NOTES
[facility] ORTHOPEDICS POST-OP NOTE    Subjective:           Chief Complaint:   Chief Complaint   Patient presents with    Left Hip - Post-op Evaluation     POD 1 Left DARNELL 12/05/22. Doing Well       Past Medical History:   Diagnosis Date    Anxiety     Arthritis     Back pain     Cancer     thyroid ca and skin ca    Depression     Femoral acetabular impingement 11/06/2017    GERD (gastroesophageal reflux disease)     Migraines     MVP (mitral valve prolapse)     RLS (restless legs syndrome)     Thyroid disease     thyroidectomy    Wears dentures     UPPER AND LOWER BRIDGE    Wears glasses        Past Surgical History:   Procedure Laterality Date    BACK SURGERY      nerve stimulator    COLONOSCOPY      COLONOSCOPY N/A 7/19/2022    Procedure: COLONOSCOPY;  Surgeon: Kuldeep Gill MD;  Location: [facility];  Service: Endoscopy;  Laterality: N/A;    HIP ARTHROPLASTY Right 6/25/2018    Procedure: ARTHROPLASTY, HIP;  Surgeon: Prosper Hodge MD;  Location: [facility] OR;  Service: Orthopedics;  Laterality: Right;  william    HYSTERECTOMY      implanted spinal cord stimulator      NONFUNCTIONING X 15 YEARS    SACRAL NERVE STIMULATOR PLACEMENT Left     SKIN CANCER EXCISION      THYROIDECTOMY N/A 3/9/2020    Procedure: TOTAL THYROIDECTOMY;  Surgeon: Kelsie Rodgers MD;  Location: [facility] OR;  Service: General;  Laterality: N/A;    TONSILLECTOMY         Current Outpatient Medications   Medication Sig    acyclovir (ZOVIRAX) 400 MG tablet acyclovir 400 mg tablet   TAKE 1 TABLET BY MOUTH TWICE DAILY    amoxicillin (AMOXIL) 500 MG capsule Take 1 capsule (500 mg total) by mouth every 8 (eight) hours. for 10 days    aspirin (ECOTRIN) 81 MG EC tablet Take 1 tablet (81 mg total) by mouth every 12 (twelve) hours.    celecoxib (CELEBREX) 200 MG capsule Take 1 capsule (200 mg total) by mouth 2 (two) times daily.    docusate sodium (COLACE) 100 MG capsule Take 1 capsule (100 mg total) by mouth 2 (two) times daily.    ergocalciferol (ERGOCALCIFEROL)  50,000 unit Cap Take 10,000 Units by mouth once daily.    esomeprazole magnesium (NEXIUM ORAL)     gabapentin (NEURONTIN) 300 MG capsule Take 1 capsule (300 mg total) by mouth 2 (two) times daily.    HYDROcodone-acetaminophen (NORCO) 7.5-325 mg per tablet Take 1 tablet by mouth every 6 (six) hours as needed for Pain.    ibuprofen (ADVIL,MOTRIN) 200 MG tablet Take 1,000 mg by mouth every 6 (six) hours as needed for Pain.    promethazine-dextromethorphan (PROMETHAZINE-DM) 6.25-15 mg/5 mL Syrp Take 5 mLs by mouth every 4 to 6 hours as needed (Cough).    rOPINIRole (REQUIP) 1 MG tablet as needed.     sertraline (ZOLOFT) 100 MG tablet Take 1 tablet (100 mg total) by mouth once daily.    SYNTHROID 125 mcg tablet Take 112 mcg by mouth once daily.     No current facility-administered medications for this visit.     Facility-Administered Medications Ordered in Other Visits   Medication    electrolyte-S (ISOLYTE)    electrolyte-S (ISOLYTE)    fentaNYL 50 mcg/mL injection 25 mcg    LIDOcaine (PF) 10 mg/ml (1%) injection 10 mg    triamcinolone acetonide injection 40 mg       Review of patient's allergies indicates:   Allergen Reactions    Morphine Swelling     NECK SWELLED    Demerol (pf) [meperidine (pf)] Other (See Comments)     HEART RACES, BOUNCES OFF WALL    Erythromycin Itching    Opioids - morphine analogues     Oxycodone-acetaminophen Itching       Family History   Problem Relation Age of Onset    Diabetes Father         late onset    Glaucoma Father     Diabetes Other         nephew    Colon cancer Neg Hx     Breast cancer Neg Hx     Colon polyps Neg Hx        Social History     Socioeconomic History    Marital status:     Number of children: 1   Tobacco Use    Smoking status: Former     Packs/day: 1.00     Years: 37.00     Pack years: 37.00     Types: Cigarettes     Quit date: 2009     Years since quittin.9    Smokeless tobacco: Never   Substance and Sexual Activity    Alcohol use: Yes     Comment:  RARELY    Drug use: No    Sexual activity: Yes     Partners: Male       History of present illness:  Patient comes in today for the left hip.  She is doing well.  Pain is well controlled.  Her wounds are      Review of Systems:    Musculoskeletal:  See HPI      Objective:        Physical Examination:    Vital Signs:  There were no vitals filed for this visit.    Body mass index is 32.62 kg/m².    This a well-developed, well nourished patient in no acute distress.  They are alert and oriented and cooperative to examination.          Pertinent New Results:    XRAY Report / Interpretation:       Assessment/Plan:      One day status post left total hip arthroplasty, doing well. Follow up for scheduled post op appointment.     This note was created using Dragon voice recognition software that occasionally misinterpreted phrases or words.

## 2022-12-08 VITALS
BODY MASS INDEX: 32.73 KG/M2 | SYSTOLIC BLOOD PRESSURE: 118 MMHG | TEMPERATURE: 98 F | OXYGEN SATURATION: 97 % | RESPIRATION RATE: 16 BRPM | HEIGHT: 65 IN | DIASTOLIC BLOOD PRESSURE: 70 MMHG | WEIGHT: 196.44 LBS | HEART RATE: 68 BPM

## 2022-12-15 ENCOUNTER — EXTERNAL HOME HEALTH (OUTPATIENT)
Dept: HOME HEALTH SERVICES | Facility: HOSPITAL | Age: 67
End: 2022-12-15
Payer: MEDICARE

## 2022-12-15 ENCOUNTER — TELEPHONE (OUTPATIENT)
Dept: ORTHOPEDICS | Facility: CLINIC | Age: 67
End: 2022-12-15

## 2022-12-15 NOTE — TELEPHONE ENCOUNTER
Received a phone call from Luis at [facility] stating, Mrs. Manuel is due to discharged from /PT on 12/16/22. Patient wants all in-home therapy as per Luis or no more therapy at all. He has requested to extend PT for two more visits until Mrs. Manuel has her (2) week follow-up in our office.    Spoke with Charanjit and he was okay with extending PT for two more visits and will discuss the importance of outpatient therapy during the follow-up visit 12/16/22.

## 2022-12-19 ENCOUNTER — OFFICE VISIT (OUTPATIENT)
Dept: ORTHOPEDICS | Facility: CLINIC | Age: 67
End: 2022-12-19
Payer: MEDICARE

## 2022-12-19 VITALS
SYSTOLIC BLOOD PRESSURE: 120 MMHG | HEIGHT: 65 IN | DIASTOLIC BLOOD PRESSURE: 70 MMHG | WEIGHT: 196 LBS | BODY MASS INDEX: 32.65 KG/M2

## 2022-12-19 DIAGNOSIS — Z96.642 S/P TOTAL LEFT HIP ARTHROPLASTY: Primary | ICD-10-CM

## 2022-12-19 PROCEDURE — 99024 PR POST-OP FOLLOW-UP VISIT: ICD-10-PCS | Mod: S$GLB,POP,, | Performed by: PHYSICIAN ASSISTANT

## 2022-12-19 PROCEDURE — 99024 POSTOP FOLLOW-UP VISIT: CPT | Mod: S$GLB,POP,, | Performed by: PHYSICIAN ASSISTANT

## 2022-12-19 NOTE — PROGRESS NOTES
[facility] ORTHOPEDICS  [address]  [place] [address]  Phone: [phone]   [phone]    Patient's PCP:Liliana Rangel MD  Referring Provider: No ref. provider found    POST-OP Note:    Subjective:        Chief Complaint:   Chief Complaint   Patient presents with    Left Hip - Post-op Evaluation     PO LT DARNELL 12/5/22, doing well       Past Medical History:   Diagnosis Date    Anxiety     Arthritis     Back pain     Cancer     thyroid ca and skin ca    Depression     Femoral acetabular impingement 11/06/2017    GERD (gastroesophageal reflux disease)     Migraines     MVP (mitral valve prolapse)     RLS (restless legs syndrome)     Thyroid disease     thyroidectomy    Wears dentures     UPPER AND LOWER BRIDGE    Wears glasses        Past Surgical History:   Procedure Laterality Date    BACK SURGERY      nerve stimulator    COLONOSCOPY      COLONOSCOPY N/A 7/19/2022    Procedure: COLONOSCOPY;  Surgeon: Kuldeep Gill MD;  Location: [facility] ENDO;  Service: Endoscopy;  Laterality: N/A;    HIP ARTHROPLASTY Right 6/25/2018    Procedure: ARTHROPLASTY, HIP;  Surgeon: Prosper Hodge MD;  Location: [facility] OR;  Service: Orthopedics;  Laterality: Right;  william    HIP ARTHROPLASTY Left 12/5/2022    Procedure: ARTHROPLASTY, HIP, LEFT;  Surgeon: Prosper Hodge MD;  Location: [facility] OR;  Service: Orthopedics;  Laterality: Left;    HYSTERECTOMY      implanted spinal cord stimulator      NONFUNCTIONING X 15 YEARS    SACRAL NERVE STIMULATOR PLACEMENT Left     SKIN CANCER EXCISION      THYROIDECTOMY N/A 3/9/2020    Procedure: TOTAL THYROIDECTOMY;  Surgeon: Kelsie Rodgers MD;  Location: [facility] OR;  Service: General;  Laterality: N/A;    TONSILLECTOMY         Current Outpatient Medications   Medication Sig    acyclovir (ZOVIRAX) 400 MG tablet acyclovir 400 mg tablet   TAKE 1 TABLET BY MOUTH TWICE DAILY    aspirin (ECOTRIN) 81 MG EC tablet Take 1 tablet (81 mg total) by mouth every 12 (twelve) hours.    celecoxib  (CELEBREX) 200 MG capsule Take 1 capsule (200 mg total) by mouth 2 (two) times daily.    docusate sodium (COLACE) 100 MG capsule Take 1 capsule (100 mg total) by mouth 2 (two) times daily.    ergocalciferol (ERGOCALCIFEROL) 50,000 unit Cap Take 10,000 Units by mouth once daily.    esomeprazole magnesium (NEXIUM ORAL)     gabapentin (NEURONTIN) 300 MG capsule Take 1 capsule (300 mg total) by mouth 2 (two) times daily.    ibuprofen (ADVIL,MOTRIN) 200 MG tablet Take 1,000 mg by mouth every 6 (six) hours as needed for Pain.    rOPINIRole (REQUIP) 1 MG tablet as needed.     sertraline (ZOLOFT) 100 MG tablet Take 1 tablet (100 mg total) by mouth once daily.    SYNTHROID 125 mcg tablet Take 112 mcg by mouth once daily.     No current facility-administered medications for this visit.     Facility-Administered Medications Ordered in Other Visits   Medication    electrolyte-S (ISOLYTE)    electrolyte-S (ISOLYTE)    fentaNYL 50 mcg/mL injection 25 mcg    LIDOcaine (PF) 10 mg/ml (1%) injection 10 mg    triamcinolone acetonide injection 40 mg       Review of patient's allergies indicates:   Allergen Reactions    Morphine Swelling     NECK SWELLED    Demerol (pf) [meperidine (pf)] Other (See Comments)     HEART RACES, BOUNCES OFF WALL    Erythromycin Itching    Opioids - morphine analogues     Oxycodone-acetaminophen Itching       Family History   Problem Relation Age of Onset    Diabetes Father         late onset    Glaucoma Father     Diabetes Other         nephew    Colon cancer Neg Hx     Breast cancer Neg Hx     Colon polyps Neg Hx        Social History     Socioeconomic History    Marital status:     Number of children: 1   Tobacco Use    Smoking status: Former     Packs/day: 1.00     Years: 37.00     Pack years: 37.00     Types: Cigarettes     Quit date: 2009     Years since quittin.9    Smokeless tobacco: Never   Substance and Sexual Activity    Alcohol use: Yes     Comment: RARELY    Drug use: No     Sexual activity: Yes     Partners: Male       History of present illness:  Ms. Castro comes in today 2 weeks status post left total hip arthroplasty.  She is doing quite well.  Her pain is very well controlled.  She is not requiring any pain medication.  She is here today for wound check and suture removal.  She is ambulating with a cane.    Review of Systems:    Musculoskeletal:  See HPI       Objective:        Physical Examination:    Vital Signs:   Vitals:    12/19/22 0832   BP: 120/70       Body mass index is 32.62 kg/m².    This a well-developed, well nourished patient in no acute distress.  They are alert and oriented and cooperative to examination.        Left hip exam: Skin to her left hip is clean dry and intact.  There is no erythema or ecchymosis.  Left hip lateral incision is healing well without wound dehiscence or drainage.  There are no signs or symptoms of infection.  Left calf is soft and nontender.  She is neurovascularly intact throughout the left lower extremity.  She can weightbear as tolerated on her left lower extremity with the expected slow sanya and antalgic gait.  She is doing quite well at just 2 weeks postop.    Pertinent New Results:     XRAY Report / Interpretation:  No new radiographs were taken on today's clinic visit.     Assessment:       1. S/P total left hip arthroplasty      Plan:     S/P total left hip arthroplasty        Follow up in about 4 weeks (around 1/16/2023) for PO //Xray// Left DARNELL 12/05/22 tues/thurs.    Sutures removed from her left hip today.  She tolerated this well.  She is advised to clean the operative site once a day with warm soapy water not apply any topical creams or ointments.  She was instructed not to submerge operative site underwater in a pool or bathtub type environment for least 1 more week.  She was advised to continue her aspirin 81 mg by mouth twice a day for least 2 more weeks to provide a full month postoperative DVT prophylaxis coverage.   Patient declined referral to outpatient physical therapy as she reports she is able to transfer, ambulate, and perform her own exercises with out restriction and very minimal pain.  Advised her that this is acceptable if she would agree to ambulate for therapy and exercise.  She was amenable to this.  I did review with her total hip replacement precautions to help reduce the incidence of dislocation.  I specifically discussed internal rotation, external rotation, and flexion at the hip.  Advised her to these precautions were and affect for 3 months postop.  We will see her back in the office in 4 weeks to see how she is progressing with her home exercise program.  She declined need for refill of any pain medication on today's visit.      Deni Hill, MPAS, PA-C    This note was created using [employer] voice recognition software that occasionally misinterprets words or phrases.

## 2022-12-21 ENCOUNTER — PATIENT MESSAGE (OUTPATIENT)
Dept: ORTHOPEDICS | Facility: CLINIC | Age: 67
End: 2022-12-21

## 2023-01-06 ENCOUNTER — OFFICE VISIT (OUTPATIENT)
Dept: FAMILY MEDICINE | Facility: CLINIC | Age: 68
End: 2023-01-06
Payer: MEDICARE

## 2023-01-06 VITALS
BODY MASS INDEX: 33.26 KG/M2 | HEIGHT: 65 IN | WEIGHT: 199.63 LBS | HEART RATE: 76 BPM | OXYGEN SATURATION: 97 % | DIASTOLIC BLOOD PRESSURE: 76 MMHG | SYSTOLIC BLOOD PRESSURE: 118 MMHG

## 2023-01-06 DIAGNOSIS — R73.03 PREDIABETES: Primary | ICD-10-CM

## 2023-01-06 DIAGNOSIS — Z12.2 SCREENING FOR LUNG CANCER: ICD-10-CM

## 2023-01-06 DIAGNOSIS — R05.9 COUGH, UNSPECIFIED TYPE: ICD-10-CM

## 2023-01-06 DIAGNOSIS — Z87.891 PERSONAL HISTORY OF NICOTINE DEPENDENCE: ICD-10-CM

## 2023-01-06 DIAGNOSIS — R09.81 NASAL CONGESTION: ICD-10-CM

## 2023-01-06 DIAGNOSIS — Z72.0 TOBACCO USE: ICD-10-CM

## 2023-01-06 PROCEDURE — 99213 PR OFFICE/OUTPT VISIT, EST, LEVL III, 20-29 MIN: ICD-10-PCS | Mod: S$PBB,,, | Performed by: STUDENT IN AN ORGANIZED HEALTH CARE EDUCATION/TRAINING PROGRAM

## 2023-01-06 PROCEDURE — 99999 PR PBB SHADOW E&M-EST. PATIENT-LVL III: ICD-10-PCS | Mod: PBBFAC,,, | Performed by: STUDENT IN AN ORGANIZED HEALTH CARE EDUCATION/TRAINING PROGRAM

## 2023-01-06 PROCEDURE — 99999 PR PBB SHADOW E&M-EST. PATIENT-LVL III: CPT | Mod: PBBFAC,,, | Performed by: STUDENT IN AN ORGANIZED HEALTH CARE EDUCATION/TRAINING PROGRAM

## 2023-01-06 PROCEDURE — 99213 OFFICE O/P EST LOW 20 MIN: CPT | Mod: S$PBB,,, | Performed by: STUDENT IN AN ORGANIZED HEALTH CARE EDUCATION/TRAINING PROGRAM

## 2023-01-06 PROCEDURE — 99213 OFFICE O/P EST LOW 20 MIN: CPT | Mod: PBBFAC,PN | Performed by: STUDENT IN AN ORGANIZED HEALTH CARE EDUCATION/TRAINING PROGRAM

## 2023-01-06 RX ORDER — CETIRIZINE HYDROCHLORIDE, PSEUDOEPHEDRINE HYDROCHLORIDE 5; 120 MG/1; MG/1
1 TABLET, FILM COATED, EXTENDED RELEASE ORAL EVERY 12 HOURS PRN
Qty: 20 TABLET | Refills: 0 | Status: SHIPPED | OUTPATIENT
Start: 2023-01-06 | End: 2023-01-16

## 2023-01-06 RX ORDER — FLUTICASONE PROPIONATE 50 MCG
1 SPRAY, SUSPENSION (ML) NASAL DAILY
Qty: 16 G | Refills: 1 | Status: SHIPPED | OUTPATIENT
Start: 2023-01-06 | End: 2023-05-31

## 2023-01-06 RX ORDER — PROMETHAZINE HYDROCHLORIDE AND DEXTROMETHORPHAN HYDROBROMIDE 6.25; 15 MG/5ML; MG/5ML
5 SYRUP ORAL EVERY 6 HOURS PRN
Qty: 118 ML | Refills: 0 | Status: SHIPPED | OUTPATIENT
Start: 2023-01-06 | End: 2023-01-16

## 2023-01-06 NOTE — PROGRESS NOTES
Subjective:       Patient ID: Gloria Manuel is a 67 y.o. female.    Chief Complaint: Cough (Pt states this has been ongoing x1 month. Pt denies any fever and body aches. Pt was given amoxicillin when symptoms first started 1 month ago and it did not help. ), Nasal Congestion, Headache, and Follow-up (Pt consents to CT lung screen and A1C.)    1 month history of sinus congestion ,headache, post nasal drip.  She was not improved by antibiotics or cough medication.  No shortness of breath. No n/v. No facial pain. No ear pain.     Review of Systems   Constitutional:  Negative for activity change, appetite change, chills, fatigue and fever.   HENT:  Positive for nasal congestion, postnasal drip and sinus pressure/congestion. Negative for ear pain, rhinorrhea, sore throat and voice change.    Respiratory:  Negative for cough, chest tightness, shortness of breath and wheezing.    Cardiovascular:  Negative for chest pain.   Gastrointestinal:  Negative for anal bleeding.   Genitourinary:  Negative for dysuria.   Integumentary:  Negative for rash.   Psychiatric/Behavioral:  Negative for sleep disturbance.        Objective:      Physical Exam  Constitutional:       General: She is not in acute distress.     Appearance: Normal appearance. She is not ill-appearing.   HENT:      Right Ear: Tympanic membrane, ear canal and external ear normal. There is no impacted cerumen.      Left Ear: Tympanic membrane, ear canal and external ear normal. There is no impacted cerumen.      Nose: Congestion present. No rhinorrhea.      Mouth/Throat:      Mouth: Mucous membranes are moist.      Pharynx: No oropharyngeal exudate or posterior oropharyngeal erythema.   Eyes:      Conjunctiva/sclera: Conjunctivae normal.   Cardiovascular:      Rate and Rhythm: Normal rate and regular rhythm.      Heart sounds: Normal heart sounds. No murmur heard.  Pulmonary:      Effort: Pulmonary effort is normal. No respiratory distress.      Breath  sounds: Normal breath sounds. No wheezing, rhonchi or rales.   Musculoskeletal:      Right lower leg: No edema.      Left lower leg: No edema.   Skin:     General: Skin is warm and dry.   Neurological:      Mental Status: She is alert. Mental status is at baseline.      Gait: Gait normal.   Psychiatric:         Mood and Affect: Mood normal.         Behavior: Behavior normal.         Thought Content: Thought content normal.         Judgment: Judgment normal.       Assessment:       1. Prediabetes    2. Tobacco use    3. Screening for lung cancer    4. Personal history of nicotine dependence    5. Cough, unspecified type    6. Nasal congestion        Plan:       Problem List Items Addressed This Visit          Endocrine    Prediabetes - Primary    Relevant Orders    Hemoglobin A1C     Other Visit Diagnoses       Tobacco use        Relevant Orders    CT Chest Lung Screening Low Dose    Screening for lung cancer        Relevant Orders    CT Chest Lung Screening Low Dose    Personal history of nicotine dependence        Relevant Orders    CT Chest Lung Screening Low Dose    Cough, unspecified type        Relevant Medications    promethazine-dextromethorphan (PROMETHAZINE-DM) 6.25-15 mg/5 mL Syrp    Nasal congestion        Relevant Medications    cetirizine-pseudoephedrine 5-120 mg Tb12    fluticasone propionate (FLONASE) 50 mcg/actuation nasal spray              Nasal saline rinses. Symptomatic treatment as ongoing viral issue.   Call if symptoms worsening

## 2023-01-17 ENCOUNTER — OFFICE VISIT (OUTPATIENT)
Dept: ORTHOPEDICS | Facility: CLINIC | Age: 68
End: 2023-01-17
Payer: MEDICARE

## 2023-01-17 VITALS — HEIGHT: 65 IN | WEIGHT: 199 LBS | BODY MASS INDEX: 33.15 KG/M2

## 2023-01-17 DIAGNOSIS — Z96.642 S/P TOTAL LEFT HIP ARTHROPLASTY: Primary | ICD-10-CM

## 2023-01-17 DIAGNOSIS — M17.12 PRIMARY OSTEOARTHRITIS OF LEFT KNEE: ICD-10-CM

## 2023-01-17 PROCEDURE — 20610 LARGE JOINT ASPIRATION/INJECTION: L KNEE: ICD-10-PCS | Mod: 79,LT,S$GLB, | Performed by: ORTHOPAEDIC SURGERY

## 2023-01-17 PROCEDURE — 20610 DRAIN/INJ JOINT/BURSA W/O US: CPT | Mod: 79,LT,S$GLB, | Performed by: ORTHOPAEDIC SURGERY

## 2023-01-17 PROCEDURE — 99024 PR POST-OP FOLLOW-UP VISIT: ICD-10-PCS | Mod: S$GLB,POP,, | Performed by: ORTHOPAEDIC SURGERY

## 2023-01-17 PROCEDURE — 99024 POSTOP FOLLOW-UP VISIT: CPT | Mod: S$GLB,POP,, | Performed by: ORTHOPAEDIC SURGERY

## 2023-01-17 RX ORDER — TRIAMCINOLONE ACETONIDE 40 MG/ML
40 INJECTION, SUSPENSION INTRA-ARTICULAR; INTRAMUSCULAR
Status: DISCONTINUED | OUTPATIENT
Start: 2023-01-17 | End: 2023-01-17 | Stop reason: HOSPADM

## 2023-01-17 RX ORDER — IBUPROFEN 100 MG/5ML
1000 SUSPENSION, ORAL (FINAL DOSE FORM) ORAL DAILY
COMMUNITY

## 2023-01-17 RX ADMIN — TRIAMCINOLONE ACETONIDE 40 MG: 40 INJECTION, SUSPENSION INTRA-ARTICULAR; INTRAMUSCULAR at 10:01

## 2023-01-17 NOTE — PROCEDURES
Large Joint Aspiration/Injection: L knee    Date/Time: 1/17/2023 10:45 AM  Performed by: Prosper Hodge MD  Authorized by: Prosper Hodge MD     Consent Done?:  Yes (Verbal)  Indications:  Pain  Site marked: the procedure site was marked    Timeout: prior to procedure the correct patient, procedure, and site was verified    Prep: patient was prepped and draped in usual sterile fashion      Local anesthesia used?: Yes    Local anesthetic:  Lidocaine 1% without epinephrine  Ultrasonic Guidance for needle placement?: No    Location:  Knee  Site:  L knee  Medications:  40 mg triamcinolone acetonide 40 mg/mL  Patient tolerance:  Patient tolerated the procedure well with no immediate complications

## 2023-01-17 NOTE — PROGRESS NOTES
[facility] POST-OP NOTE    Subjective:           Chief Complaint:   Chief Complaint   Patient presents with    Left Hip - Post-op Evaluation, Pain     Patient is here for a PO f/up Left DARNELL 12/5/22. She states she has discomfort when she sits down, feels swollen, Left knee has started hurting due to limping. Would like it injected today.     Left Knee - Pain       Past Medical History:   Diagnosis Date    Anxiety     Arthritis     Back pain     Cancer     thyroid ca and skin ca    Depression     Femoral acetabular impingement 11/06/2017    GERD (gastroesophageal reflux disease)     Migraines     MVP (mitral valve prolapse)     RLS (restless legs syndrome)     Thyroid disease     thyroidectomy    Wears dentures     UPPER AND LOWER BRIDGE    Wears glasses        Past Surgical History:   Procedure Laterality Date    BACK SURGERY      nerve stimulator    COLONOSCOPY      COLONOSCOPY N/A 07/19/2022    Procedure: COLONOSCOPY;  Surgeon: Kuldeep Gill MD;  Location: [facility];  Service: Endoscopy;  Laterality: N/A;    HIP ARTHROPLASTY Right 06/25/2018    Procedure: ARTHROPLASTY, HIP;  Surgeon: Prosper Hodge MD;  Location: [facility] OR;  Service: Orthopedics;  Laterality: Right;  william    HIP ARTHROPLASTY Left 12/05/2022    Procedure: ARTHROPLASTY, HIP, LEFT;  Surgeon: Prosper Hodge MD;  Location: [facility] OR;  Service: Orthopedics;  Laterality: Left;    HYSTERECTOMY      implanted spinal cord stimulator      NONFUNCTIONING X 15 YEARS    JOINT REPLACEMENT  June 2017    Right Hip replacement    SACRAL NERVE STIMULATOR PLACEMENT Left     SKIN CANCER EXCISION      THYROIDECTOMY N/A 03/09/2020    Procedure: TOTAL THYROIDECTOMY;  Surgeon: Kelsie Rodgers MD;  Location: [facility] OR;  Service: General;  Laterality: N/A;    TONSILLECTOMY         Current Outpatient Medications   Medication Sig    acyclovir (ZOVIRAX) 400 MG tablet acyclovir 400 mg tablet   TAKE 1 TABLET BY MOUTH TWICE DAILY    ascorbic acid, vitamin C, (VITAMIN C)  1000 MG tablet Take 1,000 mg by mouth once daily.    ergocalciferol (ERGOCALCIFEROL) 50,000 unit Cap Take 10,000 Units by mouth once daily.    esomeprazole magnesium (NEXIUM ORAL)     fluticasone propionate (FLONASE) 50 mcg/actuation nasal spray 1 spray (50 mcg total) by Each Nostril route once daily.    ibuprofen (ADVIL,MOTRIN) 200 MG tablet Take 1,000 mg by mouth every 6 (six) hours as needed for Pain.    rOPINIRole (REQUIP) 1 MG tablet as needed.     sertraline (ZOLOFT) 100 MG tablet Take 1 tablet (100 mg total) by mouth once daily.    SYNTHROID 125 mcg tablet Take 112 mcg by mouth once daily.     No current facility-administered medications for this visit.     Facility-Administered Medications Ordered in Other Visits   Medication    electrolyte-S (ISOLYTE)    electrolyte-S (ISOLYTE)    fentaNYL 50 mcg/mL injection 25 mcg    LIDOcaine (PF) 10 mg/ml (1%) injection 10 mg    triamcinolone acetonide injection 40 mg       Review of patient's allergies indicates:   Allergen Reactions    Morphine Swelling     NECK SWELLED    Demerol (pf) [meperidine (pf)] Other (See Comments)     HEART RACES, BOUNCES OFF WALL    Erythromycin Itching    Opioids - morphine analogues     Oxycodone-acetaminophen Itching       Family History   Problem Relation Age of Onset    Diabetes Father         late onset    Glaucoma Father     Diabetes Other         nephew    Colon cancer Neg Hx     Breast cancer Neg Hx     Colon polyps Neg Hx        Social History     Socioeconomic History    Marital status:     Number of children: 1   Tobacco Use    Smoking status: Former     Packs/day: 1.00     Years: 13.00     Pack years: 13.00     Types: Cigarettes     Quit date: 2009     Years since quittin.0    Smokeless tobacco: Never    Tobacco comments:     I was in high school when I started smoking - have no idea month and day, year was    Substance and Sexual Activity    Alcohol use: Yes     Comment: I drink occasionally - not every  week/day.    Drug use: No    Sexual activity: Yes     Partners: Male     Birth control/protection: Partner-Vasectomy, Post-menopausal       History of present illness:   59-year-old female returns to clinic today status post left total hip arthroplasty done December 5th.  Generally she is doing well, she has been driving, really get around fairly well.  She complains of some pain over the surgical incision.  She states that the little bumpy or lumpy, little uncomfortable with pressure on the left side of the hip when sitting in the chair.  Pt that she has to adjust her position frequently.  Denies radicular symptoms.  No pain below the knee.  She does have some and knee pain as well.  We have injected the left knee before in the past for osteoarthritis.      Review of Systems:    Musculoskeletal:  See HPI      Objective:        Physical Examination:    Vital Signs:  There were no vitals filed for this visit.    Body mass index is 33.12 kg/m².    This a well-developed, well nourished patient in no acute distress.  They are alert and oriented and cooperative to examination.          Examination of the left hip, I exposed the left hip, the surgical incision is well healed.  Skin is dry and intact, no erythema ecchymosis, no signs symptoms of infection.  No evidence of wound failure dehiscence.  She endorsed quite a bit of bruising initially postop, there is some lumpy feeling to the surgical site consistent with residual hematoma.  But again no drainage.  No pain with gentle range of motion.      Examination of the left knee, skin is dry and intact no erythema ecchymosis no effusion, no signs symptoms of infection.  Range of motion 0-120 degrees.  Stable anterior-posterior varus and valgus stress.  Calf soft nontender, straight leg raise negative.  Pertinent New Results:    XRAY Report / Interpretation:     AP and lateral views of the left hip taken today in the office demonstrate left total hip arthroplasty to be in  "appropriate position without evidence of hardware failure or loosening.      AP lateral sunrise views of the left knee taken today in the office demonstrate mild tricompartmental arthritic changes.  No other acUte findings.    Assessment/Plan:       Stable status post left total hip arthroplasty done approximately month and half ago.  Patient is moving quite a bit.  We reinforced her hip precautions, these should be in a place for 3 months.  She verbalized understanding.  Incision looks great, I do feel she probably has a little resolving or residual hematoma and soft tissue in the hip.  Recommended moist compresses.  Left knee osteoarthritis we reinjected the left knee today, anterior lateral approach, sterile technique, lidocaine and triamcinolone, patient tolerated well.  Follow-up in 6 weeks, that will give us approximately 3 months postop.      Jaden Szymanski, Physician Assistant, served in the capacity as a "scribe" for this patient encounter.  A "face-to-face" encounter occurred with Dr. Prosper Hodge on this date.  The treatment plan and medical decision-making is outlined above. Patient was seen and examined with a chaperone.     This note was created using Dragon voice recognition software that occasionally misinterpreted phrases or words.        "

## 2023-01-23 ENCOUNTER — PATIENT MESSAGE (OUTPATIENT)
Dept: ORTHOPEDICS | Facility: CLINIC | Age: 68
End: 2023-01-23

## 2023-01-25 ENCOUNTER — OFFICE VISIT (OUTPATIENT)
Dept: ORTHOPEDICS | Facility: CLINIC | Age: 68
End: 2023-01-25
Payer: MEDICARE

## 2023-01-25 VITALS
WEIGHT: 199 LBS | SYSTOLIC BLOOD PRESSURE: 118 MMHG | BODY MASS INDEX: 33.15 KG/M2 | DIASTOLIC BLOOD PRESSURE: 76 MMHG | HEIGHT: 65 IN

## 2023-01-25 DIAGNOSIS — M17.11 PRIMARY OSTEOARTHRITIS OF RIGHT KNEE: Primary | ICD-10-CM

## 2023-01-25 PROCEDURE — 20610 DRAIN/INJ JOINT/BURSA W/O US: CPT | Mod: 79,RT,S$GLB, | Performed by: PHYSICIAN ASSISTANT

## 2023-01-25 PROCEDURE — 99213 OFFICE O/P EST LOW 20 MIN: CPT | Mod: 25,24,S$GLB, | Performed by: PHYSICIAN ASSISTANT

## 2023-01-25 PROCEDURE — 20610 LARGE JOINT ASPIRATION/INJECTION: R KNEE: ICD-10-PCS | Mod: 79,RT,S$GLB, | Performed by: PHYSICIAN ASSISTANT

## 2023-01-25 PROCEDURE — 99213 PR OFFICE/OUTPT VISIT, EST, LEVL III, 20-29 MIN: ICD-10-PCS | Mod: 25,24,S$GLB, | Performed by: PHYSICIAN ASSISTANT

## 2023-01-25 RX ORDER — METHYLPREDNISOLONE ACETATE 40 MG/ML
40 INJECTION, SUSPENSION INTRA-ARTICULAR; INTRALESIONAL; INTRAMUSCULAR; SOFT TISSUE
Status: DISCONTINUED | OUTPATIENT
Start: 2023-01-25 | End: 2023-01-25 | Stop reason: HOSPADM

## 2023-01-25 RX ADMIN — METHYLPREDNISOLONE ACETATE 40 MG: 40 INJECTION, SUSPENSION INTRA-ARTICULAR; INTRALESIONAL; INTRAMUSCULAR; SOFT TISSUE at 10:01

## 2023-01-25 NOTE — PROGRESS NOTES
[facility]  [address]  [place] [address]  Phone: [phone]   [phone]    Patient's PCP: Liliana Rangel MD  Referring Provider: No ref. provider found    Subjective:      Chief Complaint:   Chief Complaint   Patient presents with    Right Knee - Pain     Patient is here with complaints of Right knee pain since 1/18/2023, Twisted it a few days ago when she fell over a goat.        Past Medical History:   Diagnosis Date    Anxiety     Arthritis     Back pain     Cancer     thyroid ca and skin ca    Depression     Femoral acetabular impingement 11/06/2017    GERD (gastroesophageal reflux disease)     Migraines     MVP (mitral valve prolapse)     RLS (restless legs syndrome)     Thyroid disease     thyroidectomy    Wears dentures     UPPER AND LOWER BRIDGE    Wears glasses        Past Surgical History:   Procedure Laterality Date    BACK SURGERY      nerve stimulator    COLONOSCOPY      COLONOSCOPY N/A 07/19/2022    Procedure: COLONOSCOPY;  Surgeon: Kuldeep Gill MD;  Location: [facility];  Service: Endoscopy;  Laterality: N/A;    HIP ARTHROPLASTY Right 06/25/2018    Procedure: ARTHROPLASTY, HIP;  Surgeon: Prosper Hodge MD;  Location: [facility] OR;  Service: Orthopedics;  Laterality: Right;  william    HIP ARTHROPLASTY Left 12/05/2022    Procedure: ARTHROPLASTY, HIP, LEFT;  Surgeon: Prosper Hodge MD;  Location: [facility] OR;  Service: Orthopedics;  Laterality: Left;    HYSTERECTOMY      implanted spinal cord stimulator      NONFUNCTIONING X 15 YEARS    JOINT REPLACEMENT  June 2017    Right Hip replacement    SACRAL NERVE STIMULATOR PLACEMENT Left     SKIN CANCER EXCISION      THYROIDECTOMY N/A 03/09/2020    Procedure: TOTAL THYROIDECTOMY;  Surgeon: Kelsie Rodgers MD;  Location: [facility] OR;  Service: General;  Laterality: N/A;    TONSILLECTOMY         Current Outpatient Medications   Medication Sig    acyclovir (ZOVIRAX) 400 MG tablet acyclovir 400 mg tablet   TAKE 1 TABLET BY MOUTH TWICE  DAILY    ascorbic acid, vitamin C, (VITAMIN C) 1000 MG tablet Take 1,000 mg by mouth once daily.    ergocalciferol (ERGOCALCIFEROL) 50,000 unit Cap Take 10,000 Units by mouth once daily.    esomeprazole magnesium (NEXIUM ORAL)     fluticasone propionate (FLONASE) 50 mcg/actuation nasal spray 1 spray (50 mcg total) by Each Nostril route once daily.    ibuprofen (ADVIL,MOTRIN) 200 MG tablet Take 1,000 mg by mouth every 6 (six) hours as needed for Pain.    rOPINIRole (REQUIP) 1 MG tablet as needed.     sertraline (ZOLOFT) 100 MG tablet Take 1 tablet (100 mg total) by mouth once daily.    SYNTHROID 125 mcg tablet Take 112 mcg by mouth once daily.     No current facility-administered medications for this visit.     Facility-Administered Medications Ordered in Other Visits   Medication    electrolyte-S (ISOLYTE)    electrolyte-S (ISOLYTE)    fentaNYL 50 mcg/mL injection 25 mcg    LIDOcaine (PF) 10 mg/ml (1%) injection 10 mg    triamcinolone acetonide injection 40 mg       Review of patient's allergies indicates:   Allergen Reactions    Morphine Swelling     NECK SWELLED    Demerol (pf) [meperidine (pf)] Other (See Comments)     HEART RACES, BOUNCES OFF WALL    Erythromycin Itching    Opioids - morphine analogues     Oxycodone-acetaminophen Itching       Family History   Problem Relation Age of Onset    Diabetes Father         late onset    Glaucoma Father     Diabetes Other         nephew    Colon cancer Neg Hx     Breast cancer Neg Hx     Colon polyps Neg Hx        Social History     Socioeconomic History    Marital status:     Number of children: 1   Tobacco Use    Smoking status: Former     Packs/day: 1.00     Years: 13.00     Pack years: 13.00     Types: Cigarettes     Quit date: 2009     Years since quittin.0    Smokeless tobacco: Never    Tobacco comments:     I was in high school when I started smoking - have no idea month and day, year was    Substance and Sexual Activity    Alcohol use: Yes      Comment: I drink occasionally - not every week/day.    Drug use: No    Sexual activity: Yes     Partners: Male     Birth control/protection: Partner-Vasectomy, Post-menopausal       History of present illness:  Ms. Castro comes in today with a chief complaint of right knee pain.  She had a twist and fall approximally 3 days ago over her dog.  She is had pain primarily along the medial aspect since this injury.  She also has some swelling she reports.  She comes in today requesting an injection.    Review of Systems:    Constitutional: Negative for chills, fever and weight loss.   HENT: Negative for congestion.    Eyes: Negative for discharge and redness.   Respiratory: Negative for cough and shortness of breath.    Cardiovascular: Negative for chest pain.   Gastrointestinal: Negative for nausea and vomiting.   Musculoskeletal: See HPI.   Skin: Negative for rash.   Neurological: Negative for headaches.   Endo/Heme/Allergies: Does not bruise/bleed easily.   Psychiatric/Behavioral: The patient is not nervous/anxious.    All other systems reviewed and are negative.       Objective:      Physical Examination:    Vital Signs:    Vitals:    01/25/23 1045   BP: 118/76       Body mass index is 33.12 kg/m².    This a well-developed, well nourished patient in no acute distress.  They are alert and oriented and cooperative to examination.     Right knee exam:  Skin to her right knee is clean dry and intact.  There is no erythema or ecchymosis.  There are no signs or symptoms of infection.  Patient is neurovascularly intact throughout the right lower extremity.  Right calf is soft and nontender.  Right knee active range of motion is approximately 0-110 degrees.  Right knee is stable to varus and valgus stresses while held in extension.  She does have some tenderness to palpation along the medial aspect specifically along the joint line.  She does have a mild effusion to the right knee.  She can weightbear as tolerated on her  right lower extremity but has a mildly antalgic gait.    Pertinent New Results:        XRAY Report / Interpretation:   A single lateral view was taken of the right knee today.  It was viewed in conjunction with x-rays taken of her left knee 2 weeks ago while able to appreciate the right knee on AP and sunrise view.  Patient is no acute fractures or dislocations seen throughout these 3 images.  Visualized soft tissues appear unremarkable.  She does have mild arthrosis tricompartmentally in her right knee.      Assessment:       1. Primary osteoarthritis of right knee      Plan:     Primary osteoarthritis of right knee  -     X-Ray Knee 1 or 2 View Right  -     Large Joint Aspiration/Injection: R knee        Follow up in about 1 month (around 2/28/2023) for  Right knee injection 1/25/23 .    I injected her right knee today via an anterior lateral approach with 40 mg of Depo-Medrol and lidocaine.  She tolerated this well.  She has a scheduled follow-up appointment with us in 4 weeks for another complaint.  I would like to assess her right knee at that time to see how she is responding to this injection.  If she is not much improved, consideration of advanced imaging with an MRI may be warranted to evaluate for any internal derangement from this twisting fall she had.        Deni Hill, MAUREENS, PA-C    This note was created using [employer] voice recognition software that occasionally misinterprets words or phrases.

## 2023-01-25 NOTE — PROCEDURES
Large Joint Aspiration/Injection: R knee    Date/Time: 1/25/2023 10:45 AM  Performed by: Deni Hill PA-C  Authorized by: Deni Hill PA-C     Consent Done?:  Yes (Verbal)  Indications:  Pain  Site marked: the procedure site was marked    Timeout: prior to procedure the correct patient, procedure, and site was verified    Prep: patient was prepped and draped in usual sterile fashion      Local anesthesia used?: Yes    Local anesthetic:  Lidocaine 2% with epinephrine    Details:  Needle Size:  25 G  Ultrasonic Guidance for needle placement?: No    Location:  Knee  Site:  R knee  Medications:  40 mg methylPREDNISolone acetate 40 mg/mL  Patient tolerance:  Patient tolerated the procedure well with no immediate complications

## 2023-02-03 ENCOUNTER — OFFICE VISIT (OUTPATIENT)
Dept: FAMILY MEDICINE | Facility: CLINIC | Age: 68
End: 2023-02-03
Payer: MEDICARE

## 2023-02-03 VITALS
RESPIRATION RATE: 15 BRPM | WEIGHT: 199 LBS | SYSTOLIC BLOOD PRESSURE: 122 MMHG | OXYGEN SATURATION: 99 % | BODY MASS INDEX: 33.15 KG/M2 | HEIGHT: 65 IN | HEART RATE: 75 BPM | DIASTOLIC BLOOD PRESSURE: 80 MMHG

## 2023-02-03 DIAGNOSIS — I70.0 AORTIC ATHEROSCLEROSIS: ICD-10-CM

## 2023-02-03 DIAGNOSIS — K21.00 GASTROESOPHAGEAL REFLUX DISEASE WITH ESOPHAGITIS WITHOUT HEMORRHAGE: ICD-10-CM

## 2023-02-03 DIAGNOSIS — F33.8 SEASONAL AFFECTIVE DISORDER: ICD-10-CM

## 2023-02-03 DIAGNOSIS — E55.9 VITAMIN D DEFICIENCY: ICD-10-CM

## 2023-02-03 DIAGNOSIS — M81.0 AGE-RELATED OSTEOPOROSIS WITHOUT CURRENT PATHOLOGICAL FRACTURE: ICD-10-CM

## 2023-02-03 DIAGNOSIS — C73 THYROID CANCER: ICD-10-CM

## 2023-02-03 DIAGNOSIS — E78.2 MIXED HYPERLIPIDEMIA: ICD-10-CM

## 2023-02-03 DIAGNOSIS — G25.81 RLS (RESTLESS LEGS SYNDROME): Primary | ICD-10-CM

## 2023-02-03 DIAGNOSIS — F33.1 MODERATE EPISODE OF RECURRENT MAJOR DEPRESSIVE DISORDER: ICD-10-CM

## 2023-02-03 DIAGNOSIS — E66.09 CLASS 1 OBESITY DUE TO EXCESS CALORIES WITHOUT SERIOUS COMORBIDITY WITH BODY MASS INDEX (BMI) OF 33.0 TO 33.9 IN ADULT: ICD-10-CM

## 2023-02-03 DIAGNOSIS — E89.0 POSTOPERATIVE HYPOTHYROIDISM: ICD-10-CM

## 2023-02-03 DIAGNOSIS — R73.03 PREDIABETES: ICD-10-CM

## 2023-02-03 DIAGNOSIS — B00.1 HERPES LABIALIS: ICD-10-CM

## 2023-02-03 PROCEDURE — 99214 OFFICE O/P EST MOD 30 MIN: CPT | Mod: S$PBB,,, | Performed by: STUDENT IN AN ORGANIZED HEALTH CARE EDUCATION/TRAINING PROGRAM

## 2023-02-03 PROCEDURE — 99999 PR PBB SHADOW E&M-EST. PATIENT-LVL IV: ICD-10-PCS | Mod: PBBFAC,,, | Performed by: STUDENT IN AN ORGANIZED HEALTH CARE EDUCATION/TRAINING PROGRAM

## 2023-02-03 PROCEDURE — 99999 PR PBB SHADOW E&M-EST. PATIENT-LVL IV: CPT | Mod: PBBFAC,,, | Performed by: STUDENT IN AN ORGANIZED HEALTH CARE EDUCATION/TRAINING PROGRAM

## 2023-02-03 PROCEDURE — 99214 OFFICE O/P EST MOD 30 MIN: CPT | Mod: PBBFAC,PN | Performed by: STUDENT IN AN ORGANIZED HEALTH CARE EDUCATION/TRAINING PROGRAM

## 2023-02-03 PROCEDURE — 99214 PR OFFICE/OUTPT VISIT, EST, LEVL IV, 30-39 MIN: ICD-10-PCS | Mod: S$PBB,,, | Performed by: STUDENT IN AN ORGANIZED HEALTH CARE EDUCATION/TRAINING PROGRAM

## 2023-02-03 RX ORDER — SERTRALINE HYDROCHLORIDE 100 MG/1
100 TABLET, FILM COATED ORAL DAILY
Qty: 90 TABLET | Refills: 3 | Status: SHIPPED | OUTPATIENT
Start: 2023-02-03

## 2023-02-03 RX ORDER — ROPINIROLE 1 MG/1
1 TABLET, FILM COATED ORAL NIGHTLY
Qty: 90 TABLET | Refills: 3 | Status: SHIPPED | OUTPATIENT
Start: 2023-02-03 | End: 2023-12-29 | Stop reason: SDUPTHER

## 2023-02-03 NOTE — ASSESSMENT & PLAN NOTE
Wt Readings from Last 6 Encounters:   02/03/23 90.3 kg (199 lb)   01/25/23 90.3 kg (199 lb)   01/17/23 90.3 kg (199 lb)   01/06/23 90.5 kg (199 lb 9.6 oz)   12/19/22 88.9 kg (196 lb)   12/06/22 88.9 kg (196 lb)

## 2023-02-03 NOTE — PROGRESS NOTES
Subjective:       Patient ID: Gloria Manuel is a 67 y.o. female.    Chief Complaint: Annual Exam    Patient Active Problem List:     Osteoarthritis of right hip     Depression     RLS (restless legs syndrome)     Postoperative hypothyroidism     Seasonal affective disorder     Osteoporosis     Mixed hyperlipidemia     Gastro-esophageal reflux disease with esophagitis     Thyroid cancer     Herpes labialis     Class 1 obesity due to excess calories without serious comorbidity with body mass index (BMI) of 33.0 to 33.9 in adult     Prediabetes     Aortic atherosclerosis    Current Outpatient Medications:  acyclovir (ZOVIRAX) 400 MG tablet, acyclovir 400 mg tablet   TAKE 1 TABLET BY MOUTH TWICE DAILY  ascorbic acid, vitamin C, (VITAMIN C) 1000 MG tablet, Take 1,000 mg by mouth once daily.  ergocalciferol (ERGOCALCIFEROL) 50,000 unit Cap, Take 10,000 Units by mouth once daily.  esomeprazole magnesium (NEXIUM ORAL),   fluticasone propionate (FLONASE) 50 mcg/actuation nasal spray, 1 spray (50 mcg total) by Each Nostril route once daily.  ibuprofen (ADVIL,MOTRIN) 200 MG tablet, Take 1,000 mg by mouth every 6 (six) hours as needed for Pain.  rOPINIRole (REQUIP) 1 MG tablet, as needed.   sertraline (ZOLOFT) 100 MG tablet, Take 1 tablet (100 mg total) by mouth once daily.  SYNTHROID 125 mcg tablet, Take 112 mcg by mouth once daily.    No current facility-administered medications for this visit.  Facility-Administered Medications Ordered in Other Visits:  electrolyte-S (ISOLYTE)  electrolyte-S (ISOLYTE)  fentaNYL 50 mcg/mL injection 25 mcg  LIDOcaine (PF) 10 mg/ml (1%) injection 10 mg  triamcinolone acetonide injection 40 mg          Review of Systems   Constitutional:  Negative for activity change, appetite change, fatigue and unexpected weight change.   HENT:  Negative for trouble swallowing.    Eyes:  Negative for visual disturbance.   Respiratory:  Negative for shortness of breath.    Cardiovascular:  Negative  for chest pain and leg swelling.   Gastrointestinal:  Negative for abdominal pain, blood in stool, change in bowel habit and change in bowel habit.   Endocrine: Negative for cold intolerance, heat intolerance, polydipsia and polyuria.   Genitourinary:  Negative for dysuria and hematuria.   Musculoskeletal:  Negative for arthralgias, back pain and gait problem.   Integumentary:  Negative for rash and wound.   Neurological:  Negative for dizziness, weakness and headaches.   Psychiatric/Behavioral:  Negative for dysphoric mood and sleep disturbance. The patient is not nervous/anxious.        Objective:      Physical Exam  Constitutional:       General: She is not in acute distress.     Appearance: Normal appearance. She is not ill-appearing.   Eyes:      Conjunctiva/sclera: Conjunctivae normal.   Cardiovascular:      Rate and Rhythm: Normal rate and regular rhythm.      Pulses: Normal pulses.      Heart sounds: Normal heart sounds. No murmur heard.  Pulmonary:      Effort: Pulmonary effort is normal. No respiratory distress.      Breath sounds: Normal breath sounds. No wheezing.   Abdominal:      Palpations: Abdomen is soft.   Musculoskeletal:      Right lower leg: No edema.      Left lower leg: No edema.   Skin:     General: Skin is warm and dry.      Findings: No rash.   Neurological:      Mental Status: She is alert. Mental status is at baseline.      Gait: Gait normal.   Psychiatric:         Mood and Affect: Mood normal.         Behavior: Behavior normal.         Thought Content: Thought content normal.         Judgment: Judgment normal.       Assessment:       1. RLS (restless legs syndrome)    2. Moderate episode of recurrent major depressive disorder    3. Seasonal affective disorder    4. Mixed hyperlipidemia    5. Herpes labialis    6. Thyroid cancer    7. Postoperative hypothyroidism    8. Class 1 obesity due to excess calories without serious comorbidity with body mass index (BMI) of 33.0 to 33.9 in adult     9. Prediabetes    10. Gastroesophageal reflux disease with esophagitis without hemorrhage    11. Age-related osteoporosis without current pathological fracture    12. Aortic atherosclerosis    13. Vitamin D deficiency        Plan:       Problem List Items Addressed This Visit          Neuro    RLS (restless legs syndrome) - Primary     Stable. Continue requip         Relevant Medications    rOPINIRole (REQUIP) 1 MG tablet       Psychiatric    Depression     Stable. Continue zoloft         Relevant Medications    sertraline (ZOLOFT) 100 MG tablet    Seasonal affective disorder     Prn therapy            Cardiac/Vascular    Mixed hyperlipidemia     Stable continue diet therapy         Relevant Orders    Lipid Panel    Aortic atherosclerosis     Continue risk factor management            ID    Herpes labialis     No recent flairs            Oncology    Thyroid cancer     Stable s/p t hryoidectomy            Endocrine    Postoperative hypothyroidism     Doing well on synthroid         Relevant Orders    TSH    Class 1 obesity due to excess calories without serious comorbidity with body mass index (BMI) of 33.0 to 33.9 in adult     Wt Readings from Last 6 Encounters:   02/03/23 90.3 kg (199 lb)   01/25/23 90.3 kg (199 lb)   01/17/23 90.3 kg (199 lb)   01/06/23 90.5 kg (199 lb 9.6 oz)   12/19/22 88.9 kg (196 lb)   12/06/22 88.9 kg (196 lb)            Prediabetes     Stable. Continue diet.  Lab Results   Component Value Date    HGBA1C 6.1 (H) 11/30/2021              Relevant Orders    CBC Auto Differential    Comprehensive Metabolic Panel    Microalbumin/Creatinine Ratio, Urine    Hemoglobin A1C       GI    Gastro-esophageal reflux disease with esophagitis     Stable. Continue current medications and regular followup.              Orthopedic    Osteoporosis     Has never been on medication  Repeat dexa         Relevant Orders    DXA Bone Density Spine And Hip     Other Visit Diagnoses       Vitamin D deficiency         Relevant Orders    Vitamin D

## 2023-02-09 ENCOUNTER — HOSPITAL ENCOUNTER (OUTPATIENT)
Dept: RADIOLOGY | Facility: HOSPITAL | Age: 68
Discharge: HOME | End: 2023-02-09
Attending: STUDENT IN AN ORGANIZED HEALTH CARE EDUCATION/TRAINING PROGRAM
Payer: MEDICARE

## 2023-02-09 DIAGNOSIS — Z87.891 PERSONAL HISTORY OF NICOTINE DEPENDENCE: ICD-10-CM

## 2023-02-09 DIAGNOSIS — Z12.2 SCREENING FOR LUNG CANCER: ICD-10-CM

## 2023-02-09 DIAGNOSIS — Z72.0 TOBACCO USE: ICD-10-CM

## 2023-02-09 DIAGNOSIS — M81.0 AGE-RELATED OSTEOPOROSIS WITHOUT CURRENT PATHOLOGICAL FRACTURE: ICD-10-CM

## 2023-02-09 PROCEDURE — 71271 CT THORAX LUNG CANCER SCR C-: CPT | Mod: 26,,, | Performed by: RADIOLOGY

## 2023-02-09 PROCEDURE — 71271 CT CHEST LUNG SCREENING LOW DOSE: ICD-10-PCS | Mod: 26,,, | Performed by: RADIOLOGY

## 2023-02-09 PROCEDURE — 77080 DXA BONE DENSITY AXIAL: CPT | Mod: TC

## 2023-02-09 PROCEDURE — 77080 DEXA BONE DENSITY SPINE HIP: ICD-10-PCS | Mod: 26,,, | Performed by: RADIOLOGY

## 2023-02-09 PROCEDURE — 71271 CT THORAX LUNG CANCER SCR C-: CPT | Mod: TC

## 2023-02-09 PROCEDURE — 77080 DXA BONE DENSITY AXIAL: CPT | Mod: 26,,, | Performed by: RADIOLOGY

## 2023-02-28 ENCOUNTER — OFFICE VISIT (OUTPATIENT)
Dept: ORTHOPEDICS | Facility: CLINIC | Age: 68
End: 2023-02-28
Payer: MEDICARE

## 2023-02-28 VITALS — HEIGHT: 65 IN | WEIGHT: 199 LBS | BODY MASS INDEX: 33.15 KG/M2

## 2023-02-28 DIAGNOSIS — Z96.642 S/P TOTAL LEFT HIP ARTHROPLASTY: Primary | ICD-10-CM

## 2023-02-28 DIAGNOSIS — M23.307 DEGENERATIVE TEAR OF MENISCUS, LEFT: ICD-10-CM

## 2023-02-28 DIAGNOSIS — M23.306 DEGENERATIVE TEAR OF MENISCUS, RIGHT: ICD-10-CM

## 2023-02-28 PROCEDURE — 99024 POSTOP FOLLOW-UP VISIT: CPT | Mod: S$GLB,POP,, | Performed by: ORTHOPAEDIC SURGERY

## 2023-02-28 PROCEDURE — 99024 PR POST-OP FOLLOW-UP VISIT: ICD-10-PCS | Mod: S$GLB,POP,, | Performed by: ORTHOPAEDIC SURGERY

## 2023-02-28 NOTE — PROGRESS NOTES
[facility] POST-OP NOTE    Subjective:           Chief Complaint:   Chief Complaint   Patient presents with    Left Hip - Pain, Post-op Evaluation     Patient is here for a Po f/up Left DARNELL 12/5/22 & Left knee inj 1/17/23, knee injection only lasted a week or so, Hip is still painful and numb, feels like she is sitting on something, has burning in left buttock.     Left Knee - Pain       Past Medical History:   Diagnosis Date    Anxiety     Arthritis     Back pain     Cancer     thyroid ca and skin ca    Depression     Femoral acetabular impingement 11/06/2017    GERD (gastroesophageal reflux disease)     Migraines     MVP (mitral valve prolapse)     RLS (restless legs syndrome)     Thyroid disease     thyroidectomy    Wears dentures     UPPER AND LOWER BRIDGE    Wears glasses        Past Surgical History:   Procedure Laterality Date    BACK SURGERY      nerve stimulator    COLONOSCOPY      COLONOSCOPY N/A 07/19/2022    Procedure: COLONOSCOPY;  Surgeon: Kuldeep Gill MD;  Location: [facility];  Service: Endoscopy;  Laterality: N/A;    HIP ARTHROPLASTY Right 06/25/2018    Procedure: ARTHROPLASTY, HIP;  Surgeon: Prosper Hodge MD;  Location: [facility] OR;  Service: Orthopedics;  Laterality: Right;  william    HIP ARTHROPLASTY Left 12/05/2022    Procedure: ARTHROPLASTY, HIP, LEFT;  Surgeon: Prosper Hodge MD;  Location: [facility] OR;  Service: Orthopedics;  Laterality: Left;    HYSTERECTOMY      implanted spinal cord stimulator      NONFUNCTIONING X 15 YEARS    JOINT REPLACEMENT  June 2017    Right Hip replacement    SACRAL NERVE STIMULATOR PLACEMENT Left     SKIN CANCER EXCISION      THYROIDECTOMY N/A 03/09/2020    Procedure: TOTAL THYROIDECTOMY;  Surgeon: Kelsie Rodgers MD;  Location: [facility] OR;  Service: General;  Laterality: N/A;    TONSILLECTOMY         Current Outpatient Medications   Medication Sig    acyclovir (ZOVIRAX) 400 MG tablet acyclovir 400 mg tablet   TAKE 1 TABLET BY MOUTH TWICE DAILY    ascorbic acid,  vitamin C, (VITAMIN C) 1000 MG tablet Take 1,000 mg by mouth once daily.    ergocalciferol (ERGOCALCIFEROL) 50,000 unit Cap Take 10,000 Units by mouth once daily.    esomeprazole magnesium (NEXIUM ORAL)     fluticasone propionate (FLONASE) 50 mcg/actuation nasal spray 1 spray (50 mcg total) by Each Nostril route once daily.    ibuprofen (ADVIL,MOTRIN) 200 MG tablet Take 1,000 mg by mouth every 6 (six) hours as needed for Pain.    rOPINIRole (REQUIP) 1 MG tablet Take 1 tablet (1 mg total) by mouth every evening.    sertraline (ZOLOFT) 100 MG tablet Take 1 tablet (100 mg total) by mouth once daily.    SYNTHROID 125 mcg tablet Take 112 mcg by mouth once daily.     No current facility-administered medications for this visit.     Facility-Administered Medications Ordered in Other Visits   Medication    electrolyte-S (ISOLYTE)    electrolyte-S (ISOLYTE)    fentaNYL 50 mcg/mL injection 25 mcg    LIDOcaine (PF) 10 mg/ml (1%) injection 10 mg    triamcinolone acetonide injection 40 mg       Review of patient's allergies indicates:   Allergen Reactions    Morphine Swelling     NECK SWELLED    Demerol (pf) [meperidine (pf)] Other (See Comments)     HEART RACES, BOUNCES OFF WALL    Erythromycin Itching    Opioids - morphine analogues     Oxycodone-acetaminophen Itching       Family History   Problem Relation Age of Onset    Diabetes Father         late onset    Glaucoma Father     Diabetes Other         nephew    Colon cancer Neg Hx     Breast cancer Neg Hx     Colon polyps Neg Hx        Social History     Socioeconomic History    Marital status:     Number of children: 1   Tobacco Use    Smoking status: Former     Packs/day: 1.00     Years: 13.00     Pack years: 13.00     Types: Cigarettes     Quit date: 2009     Years since quittin.1    Smokeless tobacco: Never    Tobacco comments:     I was in high school when I started smoking - have no idea month and day, year was    Substance and Sexual Activity     Alcohol use: Yes     Comment: I drink occasionally - not every week/day.    Drug use: No    Sexual activity: Yes     Partners: Male     Birth control/protection: Partner-Vasectomy, Post-menopausal       History of present illness: 67-year-old female returns to clinic today status post left total hip arthroplasty done December 5th.  Generally she is doing well, she has been driving, really get around fairly well.  She complains of some pain over the surgical incision.  She states that the little bumpy or lumpy, little uncomfortable with pressure on the left side of the hip when sitting in the chair.  Pt that she has to adjust her position frequently.  Denies radicular symptoms.  No pain below the knee.      She does have some bilateral knee pain as well.  We have injected the left and right knees before in the past for osteoarthritis, both within the last 60 days.      Review of Systems:    Musculoskeletal:  See HPI      Objective:        Physical Examination:    Vital Signs:  There were no vitals filed for this visit.    Body mass index is 33.12 kg/m².    This a well-developed, well nourished patient in no acute distress.  They are alert and oriented and cooperative to examination.          Examination of the left hip, I exposed the left hip, the surgical incision is well healed.  Skin is dry and intact, no erythema ecchymosis, no signs symptoms of infection.  No evidence of wound failure dehiscence.  She endorsed quite a bit of bruising initially postop, there is some lumpy feeling to the surgical site consistent with residual hematoma.  But again no drainage.  No pain with gentle range of motion.       Examination of the bilateral knees, skin is dry and intact no erythema ecchymosis no effusion, no signs symptoms of infection.  Range of motion 0-120 degrees.  Stable anterior-posterior varus and valgus stress.  Medial joint line pain bilaterally.  Pain with meniscal maneuvers medially right slightly worse than the  "left in terms of subjective discomfort.  Calf soft nontender, straight leg raise negative.    Pertinent New Results:    XRAY Report / Interpretation:       Assessment/Plan:      67-year-old female status post left total hip arthroplasty now done approximately 3 months ago.  Overall doing well.  Still complains of a little numbness and tingling some altered sensation around the surgical incision and still bothers her is a little bit to sit on the left hip.  Occasional groin pain.  No new x-rays today.  We will check her back in 3 months.  Osteoarthritis bilateral knees.  Both injected late January.  At this time, she would like to just continue with conservative therapy, personally she wants to try a knee sleeve or brace.  I am not optimistic this will provide her any meaningful relief in her symptoms.  She has had injections, she has done quad strengthening she continues to complain of pain and instability in the knees.  I have recommended knee arthroscopy for diagnostic purposes.  She more than likely has degenerative meniscal tears bilaterally medially as well as some component of mild to moderate osteoarthritis.  If or when the patient is ready will consider bilateral knee arthroscopy for clean outs    Jaden Szymanski, Physician Assistant, served in the capacity as a "scribe" for this patient encounter.  A "face-to-face" encounter occurred with Dr. Prosper Hodge on this date.  The treatment plan and medical decision-making is outlined above. Patient was seen and examined with a chaperone.     This note was created using Dragon voice recognition software that occasionally misinterpreted phrases or words.        "

## 2023-03-03 ENCOUNTER — PATIENT MESSAGE (OUTPATIENT)
Dept: FAMILY MEDICINE | Facility: CLINIC | Age: 68
End: 2023-03-03
Payer: MEDICARE

## 2023-03-14 ENCOUNTER — OFFICE VISIT (OUTPATIENT)
Dept: FAMILY MEDICINE | Facility: CLINIC | Age: 68
End: 2023-03-14
Payer: MEDICARE

## 2023-03-14 DIAGNOSIS — J43.2 CENTRILOBULAR EMPHYSEMA: ICD-10-CM

## 2023-03-14 DIAGNOSIS — R73.03 PREDIABETES: Primary | ICD-10-CM

## 2023-03-14 PROBLEM — Z85.850 HISTORY OF THYROID CANCER: Status: ACTIVE | Noted: 2021-05-25

## 2023-03-14 PROCEDURE — 99214 OFFICE O/P EST MOD 30 MIN: CPT | Mod: 95,,, | Performed by: STUDENT IN AN ORGANIZED HEALTH CARE EDUCATION/TRAINING PROGRAM

## 2023-03-14 PROCEDURE — 99214 PR OFFICE/OUTPT VISIT, EST, LEVL IV, 30-39 MIN: ICD-10-PCS | Mod: 95,,, | Performed by: STUDENT IN AN ORGANIZED HEALTH CARE EDUCATION/TRAINING PROGRAM

## 2023-03-14 RX ORDER — SEMAGLUTIDE 1.34 MG/ML
0.25 INJECTION, SOLUTION SUBCUTANEOUS
Qty: 1.5 ML | Refills: 3 | Status: SHIPPED | OUTPATIENT
Start: 2023-03-14 | End: 2024-03-13

## 2023-03-14 NOTE — PROGRESS NOTES
The patient location is: [place]  The chief complaint leading to consultation is: Prediabetes    Visit type: audiovisual    Face to Face time with patient: 9 mins  9 minutes of total time spent on the encounter, which includes face to face time and non-face to face time preparing to see the patient (eg, review of tests), Obtaining and/or reviewing separately obtained history, Documenting clinical information in the electronic or other health record, Independently interpreting results (not separately reported) and communicating results to the patient/family/caregiver, or Care coordination (not separately reported).     Each patient to whom he or she provides medical services by telemedicine is:  (1) informed of the relationship between the physician and patient and the respective role of any other health care provider with respect to management of the patient; and (2) notified that he or she may decline to receive medical services by telemedicine and may withdraw from such care at any time.    Subjective:       Patient ID: Gloria Manuel is a 67 y.o. female.    Chief Complaint: prediabetes      Prediabetes-she would like to try the glp-1s for control of her glucoses and weight loss  Lab Results       Component                Value               Date                       HGBA1C                   6.1 (H)             11/30/2021              Wt Readings from Last 6 Encounters:  02/28/23 : 90.3 kg (199 lb)  02/03/23 : 90.3 kg (199 lb)  01/25/23 : 90.3 kg (199 lb)  01/17/23 : 90.3 kg (199 lb)  01/06/23 : 90.5 kg (199 lb 9.6 oz)  12/19/22 : 88.9 kg (196 lb)        Diabetes  She has type 1 diabetes mellitus. No MedicAlert identification noted. The initial diagnosis of diabetes was made 1 year ago. Pertinent negatives for hypoglycemia include no confusion, dizziness, headaches, hunger, mood changes, nervousness/anxiousness, pallor, seizures, sleepiness, speech difficulty, sweats or tremors. Associated symptoms  include blurred vision. Pertinent negatives for hypoglycemia complications include no blackouts, no hospitalization, no nocturnal hypoglycemia, no required assistance and no required glucagon injection. Symptoms are stable. Pertinent negatives for diabetic complications include no autonomic neuropathy, CVA, heart disease, nephropathy, peripheral neuropathy, PVD or retinopathy. There are no known risk factors and post-menopausal for coronary artery disease. Risk factors for coronary artery disease include no known risk factors and post-menopausal. When asked about current treatments, none were reported. She is compliant with treatment none of the time. Her weight is increasing steadily. She is following a generally healthy diet. When asked about meal planning, she reported none. She has not had a previous visit with a dietitian. She never participates in exercise. She does not see a podiatrist.Eye exam is current.   Review of Systems   Eyes:  Positive for blurred vision.   Integumentary:  Negative for pallor.   Neurological:  Negative for dizziness, tremors, seizures, speech difficulty and headaches.   Psychiatric/Behavioral:  Negative for confusion. The patient is not nervous/anxious.        Objective:      Physical Exam  Constitutional:       General: She is not in acute distress.     Appearance: Normal appearance. She is not ill-appearing.   Pulmonary:      Effort: Pulmonary effort is normal. No respiratory distress.   Neurological:      Mental Status: She is alert.   Psychiatric:         Mood and Affect: Mood normal.         Behavior: Behavior normal.         Thought Content: Thought content normal.         Judgment: Judgment normal.       Assessment:       1. Prediabetes    2. Centrilobular emphysema        Plan:       Problem List Items Addressed This Visit          Pulmonary    Centrilobular emphysema     Seen on CT. Asymptomatic. monitor            Endocrine    Prediabetes - Primary    Relevant Medications     semaglutide (OZEMPIC) 0.25 mg or 0.5 mg(2 mg/1.5 mL) pen injector

## 2023-03-16 ENCOUNTER — OFFICE VISIT (OUTPATIENT)
Dept: ORTHOPEDICS | Facility: CLINIC | Age: 68
End: 2023-03-16
Payer: MEDICARE

## 2023-03-16 VITALS — WEIGHT: 199 LBS | BODY MASS INDEX: 33.15 KG/M2 | HEIGHT: 65 IN

## 2023-03-16 DIAGNOSIS — M75.41 IMPINGEMENT SYNDROME OF RIGHT SHOULDER: Primary | ICD-10-CM

## 2023-03-16 PROCEDURE — 99213 OFFICE O/P EST LOW 20 MIN: CPT | Mod: 25,S$GLB,, | Performed by: ORTHOPAEDIC SURGERY

## 2023-03-16 PROCEDURE — 20610 DRAIN/INJ JOINT/BURSA W/O US: CPT | Mod: RT,S$GLB,, | Performed by: ORTHOPAEDIC SURGERY

## 2023-03-16 PROCEDURE — 99213 PR OFFICE/OUTPT VISIT, EST, LEVL III, 20-29 MIN: ICD-10-PCS | Mod: 25,S$GLB,, | Performed by: ORTHOPAEDIC SURGERY

## 2023-03-16 PROCEDURE — 20610 LARGE JOINT ASPIRATION/INJECTION: R SUBACROMIAL BURSA: ICD-10-PCS | Mod: RT,S$GLB,, | Performed by: ORTHOPAEDIC SURGERY

## 2023-03-16 RX ORDER — TRIAMCINOLONE ACETONIDE 40 MG/ML
40 INJECTION, SUSPENSION INTRA-ARTICULAR; INTRAMUSCULAR
Status: DISCONTINUED | OUTPATIENT
Start: 2023-03-16 | End: 2023-03-16 | Stop reason: HOSPADM

## 2023-03-16 RX ADMIN — TRIAMCINOLONE ACETONIDE 40 MG: 40 INJECTION, SUSPENSION INTRA-ARTICULAR; INTRAMUSCULAR at 10:03

## 2023-03-16 NOTE — PROCEDURES
Large Joint Aspiration/Injection: R subacromial bursa    Date/Time: 3/16/2023 10:30 AM  Performed by: Prosper Hodge MD  Authorized by: Prosper Hodge MD     Consent Done?:  Yes (Verbal)  Indications:  Pain  Site marked: the procedure site was marked    Timeout: prior to procedure the correct patient, procedure, and site was verified    Prep: patient was prepped and draped in usual sterile fashion      Local anesthesia used?: Yes    Local anesthetic:  Lidocaine 1% without epinephrine    Details:  Needle Size:  25 G  Ultrasonic Guidance for needle placement?: No    Location:  Shoulder  Site:  R subacromial bursa  Medications:  40 mg triamcinolone acetonide 40 mg/mL  Patient tolerance:  Patient tolerated the procedure well with no immediate complications

## 2023-03-16 NOTE — PROGRESS NOTES
[facility]    Subjective:     Chief Complaint:   Chief Complaint   Patient presents with    Right Shoulder - Pain     Patient is having right shoulder pain, this pain started about two/three weeks ago, she has painful and limited range of motion.       Past Medical History:   Diagnosis Date    Anxiety     Arthritis     Back pain     Cancer     thyroid ca and skin ca    Depression     Femoral acetabular impingement 11/06/2017    GERD (gastroesophageal reflux disease)     Migraines     MVP (mitral valve prolapse)     RLS (restless legs syndrome)     Thyroid disease     thyroidectomy    Wears dentures     UPPER AND LOWER BRIDGE    Wears glasses        Past Surgical History:   Procedure Laterality Date    BACK SURGERY      nerve stimulator    COLONOSCOPY      COLONOSCOPY N/A 07/19/2022    Procedure: COLONOSCOPY;  Surgeon: Kuldeep Gill MD;  Location: [facility];  Service: Endoscopy;  Laterality: N/A;    HIP ARTHROPLASTY Right 06/25/2018    Procedure: ARTHROPLASTY, HIP;  Surgeon: Prosper Hodge MD;  Location: [facility] OR;  Service: Orthopedics;  Laterality: Right;  william    HIP ARTHROPLASTY Left 12/05/2022    Procedure: ARTHROPLASTY, HIP, LEFT;  Surgeon: Prosper Hodge MD;  Location: [facility] OR;  Service: Orthopedics;  Laterality: Left;    HYSTERECTOMY      implanted spinal cord stimulator      NONFUNCTIONING X 15 YEARS    JOINT REPLACEMENT  June 2017    Right Hip replacement    SACRAL NERVE STIMULATOR PLACEMENT Left     SKIN CANCER EXCISION      THYROIDECTOMY N/A 03/09/2020    Procedure: TOTAL THYROIDECTOMY;  Surgeon: Kelsie Rodgers MD;  Location: [facility] OR;  Service: General;  Laterality: N/A;    TONSILLECTOMY         Current Outpatient Medications   Medication Sig    acyclovir (ZOVIRAX) 400 MG tablet acyclovir 400 mg tablet   TAKE 1 TABLET BY MOUTH TWICE DAILY    ascorbic acid, vitamin C, (VITAMIN C) 1000 MG tablet Take 1,000 mg by mouth once daily.    ergocalciferol (ERGOCALCIFEROL) 50,000 unit Cap Take 10,000  Units by mouth once daily.    esomeprazole magnesium (NEXIUM ORAL)     fluticasone propionate (FLONASE) 50 mcg/actuation nasal spray 1 spray (50 mcg total) by Each Nostril route once daily.    ibuprofen (ADVIL,MOTRIN) 200 MG tablet Take 1,000 mg by mouth every 6 (six) hours as needed for Pain.    rOPINIRole (REQUIP) 1 MG tablet Take 1 tablet (1 mg total) by mouth every evening.    semaglutide (OZEMPIC) 0.25 mg or 0.5 mg(2 mg/1.5 mL) pen injector Inject 0.25 mg into the skin every 7 days.    sertraline (ZOLOFT) 100 MG tablet Take 1 tablet (100 mg total) by mouth once daily.    SYNTHROID 125 mcg tablet Take 112 mcg by mouth once daily.     No current facility-administered medications for this visit.     Facility-Administered Medications Ordered in Other Visits   Medication    electrolyte-S (ISOLYTE)    electrolyte-S (ISOLYTE)    fentaNYL 50 mcg/mL injection 25 mcg    LIDOcaine (PF) 10 mg/ml (1%) injection 10 mg    triamcinolone acetonide injection 40 mg       Review of patient's allergies indicates:   Allergen Reactions    Morphine Swelling     NECK SWELLED    Demerol (pf) [meperidine (pf)] Other (See Comments)     HEART RACES, BOUNCES OFF WALL    Erythromycin Itching    Opioids - morphine analogues     Oxycodone-acetaminophen Itching       Family History   Problem Relation Age of Onset    Diabetes Father         late onset    Glaucoma Father     Diabetes Other         nephew    Colon cancer Neg Hx     Breast cancer Neg Hx     Colon polyps Neg Hx        Social History     Socioeconomic History    Marital status:     Number of children: 1   Tobacco Use    Smoking status: Former     Packs/day: 1.00     Years: 13.00     Pack years: 13.00     Types: Cigarettes     Quit date: 2009     Years since quittin.1    Smokeless tobacco: Never    Tobacco comments:     I was in high school when I started smoking - have no idea month and day, year was    Substance and Sexual Activity    Alcohol use: Yes      Comment: I drink occasionally - not every week/day.    Drug use: No    Sexual activity: Yes     Partners: Male     Birth control/protection: Partner-Vasectomy, Post-menopausal       History of present illness:  67-year-old female with history of right shoulder pain, treated for impingement, subacromial injection last in November in previously in August.  Pain has returned.      Review of Systems:    Constitution: Negative for chills, fever, and sweats.  Negative for unexplained weight loss.    HENT:  Negative for headaches and blurry vision.    Cardiovascular:Negative for chest pain or irregular heart beat. Negative for hypertension.    Respiratory:  Negative for cough and shortness of breath.    Gastrointestinal: Negative for abdominal pain, heartburn, melena, nausea, and vomitting.    Genitourinary:  Negative bladder incontinence and dysuria.    Musculoskeletal:  See HPI for details.     Neurological: Negative for numbness.    Psychiatric/Behavioral: Negative for depression.  The patient is not nervous/anxious.      Endocrine: Negative for polyuria    Hematologic/Lymphatic: Negative for bleeding problem.  Does not bruise/bleed easily.    Skin: Negative for poor would healing and rash    Objective:      Physical Examination:    Vital Signs:  There were no vitals filed for this visit.    Body mass index is 33.12 kg/m².    This a well-developed, well nourished patient in no acute distress.  They are alert and oriented and cooperative to examination.        Examination of the right shoulder, patient  with dry and intact skin, no erythema or ecchymosis no signs symptoms of infection.  Limited range of motion with pain at the endpoints.  Decreased forward flexion, 170°, external rotation 70°, internal rotation only to the posterior right hip.  She gets pain with speed's test, pain with crossover pain with Neer's pain with Vega test.  Perry's test mildly weak but not painful.    Pertinent New Results:    XRAY Report /  "Interpretation:       Assessment/Plan:      Subacromial bursitis right shoulder, we injected the right shoulder today, this was the 3rd injection in 6 months.  If she still having pain in a month, order an MRI.    Jaden Szymanski, Physician Assistant, served in the capacity as a "scribe" for this patient encounter.  A "face-to-face" encounter occurred with Dr. Prosper Hodge on this date.  The treatment plan and medical decision-making is outlined above. Patient was seen and examined with a chaperone.       This note was created using Dragon voice recognition software that occasionally misinterpreted phrases or words.          "

## 2023-03-27 ENCOUNTER — TELEPHONE (OUTPATIENT)
Dept: FAMILY MEDICINE | Facility: CLINIC | Age: 68
End: 2023-03-27
Payer: MEDICARE

## 2023-03-27 NOTE — TELEPHONE ENCOUNTER
----- Message from Milagros Torres sent at 3/27/2023  2:54 PM CDT -----  Contact: Brodie  Type:  Needs Medical Advice    Who Called:  Brodie with [facility]     Would the patient rather a call back or a response via [facility]? Call     Best Call Back Number:  [phone]     Additional Information:  Brodie with [facility] would like to speak with the nurse to see if a fax has been received. Reference number is [id number].     Please call to advise

## 2023-03-31 ENCOUNTER — TELEPHONE (OUTPATIENT)
Dept: FAMILY MEDICINE | Facility: CLINIC | Age: 68
End: 2023-03-31
Payer: MEDICARE

## 2023-03-31 NOTE — TELEPHONE ENCOUNTER
Brodie with [facility] is calling to verify a fax has been received regarding a medical assessment with lab requisition for cancer genetic testing, he stated this matter  is time sensitive as they are needing to make a medical decision, please refer to reference number [id number].... Please contact Brodie to further discuss and advise     Message was routed to clinical nurse, on 3.27.23 with no response, please advise if received.

## 2023-03-31 NOTE — TELEPHONE ENCOUNTER
----- Message from Daniela Freed sent at 3/31/2023 10:19 AM CDT -----  Regarding: Verify Fax has been received    Name of Who is Calling: Brodie with [facility]       What is the request in detail: Brodie with [facility] is calling to verify a fax has been received regarding a medical assessment with lab requisition for cancer genetic testing, he stated this matter  is time sensitive as they are needing to make a medical decision, please refer to reference number [id number].... Please contact Brodie to further discuss and advise       Can the clinic reply by [facility]: No      What Number to Call Back [facility] not [facility]: [phone]

## 2023-04-11 ENCOUNTER — PATIENT MESSAGE (OUTPATIENT)
Dept: FAMILY MEDICINE | Facility: CLINIC | Age: 68
End: 2023-04-11
Payer: MEDICARE

## 2023-04-11 ENCOUNTER — OFFICE VISIT (OUTPATIENT)
Dept: ORTHOPEDICS | Facility: CLINIC | Age: 68
End: 2023-04-11
Payer: MEDICARE

## 2023-04-11 ENCOUNTER — TELEPHONE (OUTPATIENT)
Dept: FAMILY MEDICINE | Facility: CLINIC | Age: 68
End: 2023-04-11
Payer: MEDICARE

## 2023-04-11 VITALS
DIASTOLIC BLOOD PRESSURE: 74 MMHG | BODY MASS INDEX: 33.15 KG/M2 | SYSTOLIC BLOOD PRESSURE: 120 MMHG | WEIGHT: 199 LBS | HEIGHT: 65 IN

## 2023-04-11 DIAGNOSIS — M23.201 OTHER OLD TEAR OF LATERAL MENISCUS OF LEFT KNEE: ICD-10-CM

## 2023-04-11 DIAGNOSIS — M54.16 LUMBAR RADICULITIS: Primary | ICD-10-CM

## 2023-04-11 DIAGNOSIS — M76.32 IT BAND SYNDROME, LEFT: ICD-10-CM

## 2023-04-11 PROCEDURE — 99213 PR OFFICE/OUTPT VISIT, EST, LEVL III, 20-29 MIN: ICD-10-PCS | Mod: 25,S$GLB,, | Performed by: PHYSICIAN ASSISTANT

## 2023-04-11 PROCEDURE — 20610 LARGE JOINT ASPIRATION/INJECTION: L KNEE: ICD-10-PCS | Mod: LT,S$GLB,, | Performed by: PHYSICIAN ASSISTANT

## 2023-04-11 PROCEDURE — 20610 DRAIN/INJ JOINT/BURSA W/O US: CPT | Mod: LT,S$GLB,, | Performed by: PHYSICIAN ASSISTANT

## 2023-04-11 PROCEDURE — 99213 OFFICE O/P EST LOW 20 MIN: CPT | Mod: 25,S$GLB,, | Performed by: PHYSICIAN ASSISTANT

## 2023-04-11 RX ORDER — TRIAMCINOLONE ACETONIDE 40 MG/ML
40 INJECTION, SUSPENSION INTRA-ARTICULAR; INTRAMUSCULAR
Status: DISCONTINUED | OUTPATIENT
Start: 2023-04-11 | End: 2023-04-11 | Stop reason: HOSPADM

## 2023-04-11 RX ADMIN — TRIAMCINOLONE ACETONIDE 40 MG: 40 INJECTION, SUSPENSION INTRA-ARTICULAR; INTRAMUSCULAR at 11:04

## 2023-04-11 NOTE — PROCEDURES
Large Joint Aspiration/Injection: L knee    Date/Time: 4/11/2023 11:15 AM  Performed by: Deni Hill PA-C  Authorized by: Deni Hill PA-C     Consent Done?:  Yes (Verbal)  Indications:  Pain  Site marked: the procedure site was marked    Timeout: prior to procedure the correct patient, procedure, and site was verified    Prep: patient was prepped and draped in usual sterile fashion      Local anesthesia used?: Yes    Local anesthetic:  Lidocaine 1% without epinephrine    Details:  Needle Size:  25 G  Ultrasonic Guidance for needle placement?: No    Location:  Knee  Site:  L knee  Medications:  40 mg triamcinolone acetonide 40 mg/mL  Patient tolerance:  Patient tolerated the procedure well with no immediate complications

## 2023-04-11 NOTE — PROGRESS NOTES
"  [facility]  [address]  [place] [address]  Phone: [phone]   [phone]    Patient's PCP: Liliana Rangel MD  Referring Provider: No ref. provider found    Subjective:      Chief Complaint:   Chief Complaint   Patient presents with    Left Knee - Pain     LT knee pain, had an injection 1/17/23. New onset of different symptom of what she describes as a "pinched nerve" in knee. She has pain/burning,shooting pain from l/sp to knee. Tenderness to touch one area on lateral aspect       Past Medical History:   Diagnosis Date    Anxiety     Arthritis     Back pain     Cancer     thyroid ca and skin ca    Depression     Femoral acetabular impingement 11/06/2017    GERD (gastroesophageal reflux disease)     Migraines     MVP (mitral valve prolapse)     RLS (restless legs syndrome)     Thyroid disease     thyroidectomy    Wears dentures     UPPER AND LOWER BRIDGE    Wears glasses        Past Surgical History:   Procedure Laterality Date    BACK SURGERY      nerve stimulator    COLONOSCOPY      COLONOSCOPY N/A 07/19/2022    Procedure: COLONOSCOPY;  Surgeon: Kuldeep Gill MD;  Location: [facility];  Service: Endoscopy;  Laterality: N/A;    HIP ARTHROPLASTY Right 06/25/2018    Procedure: ARTHROPLASTY, HIP;  Surgeon: Prosper Hodge MD;  Location: [facility] OR;  Service: Orthopedics;  Laterality: Right;  william    HIP ARTHROPLASTY Left 12/05/2022    Procedure: ARTHROPLASTY, HIP, LEFT;  Surgeon: Prosper Hodge MD;  Location: [facility] OR;  Service: Orthopedics;  Laterality: Left;    HYSTERECTOMY      implanted spinal cord stimulator      NONFUNCTIONING X 15 YEARS    JOINT REPLACEMENT  June 2017    Right Hip replacement    SACRAL NERVE STIMULATOR PLACEMENT Left     SKIN CANCER EXCISION      THYROIDECTOMY N/A 03/09/2020    Procedure: TOTAL THYROIDECTOMY;  Surgeon: Kelsie Rodgers MD;  Location: [facility] OR;  Service: General;  Laterality: N/A;    TONSILLECTOMY         Current Outpatient Medications "   Medication Sig    acyclovir (ZOVIRAX) 400 MG tablet acyclovir 400 mg tablet   TAKE 1 TABLET BY MOUTH TWICE DAILY    ascorbic acid, vitamin C, (VITAMIN C) 1000 MG tablet Take 1,000 mg by mouth once daily.    ergocalciferol (ERGOCALCIFEROL) 50,000 unit Cap Take 10,000 Units by mouth once daily.    esomeprazole magnesium (NEXIUM ORAL)     fluticasone propionate (FLONASE) 50 mcg/actuation nasal spray 1 spray (50 mcg total) by Each Nostril route once daily.    ibuprofen (ADVIL,MOTRIN) 200 MG tablet Take 1,000 mg by mouth every 6 (six) hours as needed for Pain.    rOPINIRole (REQUIP) 1 MG tablet Take 1 tablet (1 mg total) by mouth every evening.    semaglutide (OZEMPIC) 0.25 mg or 0.5 mg(2 mg/1.5 mL) pen injector Inject 0.25 mg into the skin every 7 days.    sertraline (ZOLOFT) 100 MG tablet Take 1 tablet (100 mg total) by mouth once daily.    SYNTHROID 125 mcg tablet Take 112 mcg by mouth once daily.     No current facility-administered medications for this visit.     Facility-Administered Medications Ordered in Other Visits   Medication    electrolyte-S (ISOLYTE)    electrolyte-S (ISOLYTE)    fentaNYL 50 mcg/mL injection 25 mcg    LIDOcaine (PF) 10 mg/ml (1%) injection 10 mg    triamcinolone acetonide injection 40 mg       Review of patient's allergies indicates:   Allergen Reactions    Morphine Swelling     NECK SWELLED    Demerol (pf) [meperidine (pf)] Other (See Comments)     HEART RACES, BOUNCES OFF WALL    Erythromycin Itching    Opioids - morphine analogues     Oxycodone-acetaminophen Itching       Family History   Problem Relation Age of Onset    Diabetes Father         late onset    Glaucoma Father     Diabetes Other         nephew    Colon cancer Neg Hx     Breast cancer Neg Hx     Colon polyps Neg Hx        Social History     Socioeconomic History    Marital status:     Number of children: 1   Tobacco Use    Smoking status: Former     Packs/day: 1.00     Years: 13.00     Pack years: 13.00     Types:  Cigarettes     Quit date: 2009     Years since quittin.2    Smokeless tobacco: Never    Tobacco comments:     I was in high school when I started smoking - have no idea month and day, year was    Substance and Sexual Activity    Alcohol use: Yes     Comment: I drink occasionally - not every week/day.    Drug use: No    Sexual activity: Yes     Partners: Male     Birth control/protection: Partner-Vasectomy, Post-menopausal       History of present illness:  Ms. Castro comes in today with a chief complaint of left lateral knee pain the past several days.  She does not recollect any injury or trauma.  She did have an injection in her left knee about 3 months ago which did provide some relief.  She denies any new injury or fall.  She does have a history of bilateral total hip arthroplasties.  She does have a history of a spinal stimulator as well.    Review of Systems:    Constitutional: Negative for chills, fever and weight loss.   HENT: Negative for congestion.    Eyes: Negative for discharge and redness.   Respiratory: Negative for cough and shortness of breath.    Cardiovascular: Negative for chest pain.   Gastrointestinal: Negative for nausea and vomiting.   Musculoskeletal: See HPI.   Skin: Negative for rash.   Neurological: Negative for headaches.   Endo/Heme/Allergies: Does not bruise/bleed easily.   Psychiatric/Behavioral: The patient is not nervous/anxious.    All other systems reviewed and are negative.       Objective:      Physical Examination:    Vital Signs:    Vitals:    23 1109   BP: 120/74       Body mass index is 33.12 kg/m².    This a well-developed, well nourished patient in no acute distress.  They are alert and oriented and cooperative to examination.     Left knee exam: Skin to left knee is clean dry and intact.  There is no erythema or ecchymosis.  There are no signs or symptoms of infection.  Patient is neurovascularly intact throughout the left lower extremity.  There is no  effusion to the knee.  Left calf is soft and nontender.  Patient can actively range of motion of the knee 0-95 degrees.  Left knee is stable to varus and valgus stresses without pain.  She does have some mild anterior lateral joint line pain.  She is exquisitely tender direct laterally over the distal iliotibial band.  She can weightbear as tolerated on the left lower extremity with an antalgic gait.  Straight leg maneuver on the left is equivocal.    Pertinent New Results:        XRAY Report / Interpretation:   Two views were taken of her lumbar spine today: AP and lateral views.  I do not appreciate any acute fractures or dislocations.  Visualized soft tissues appear unremarkable.  She has facet sclerosis throughout the lumbar spine and mild degenerative disc disease throughout.      Assessment:       1. Lumbar radiculitis    2. Other old tear of lateral meniscus of left knee    3. It band syndrome, left      Plan:     Lumbar radiculitis  -     X-Ray Lumbar Spine Ap And Lateral    Other old tear of lateral meniscus of left knee  -     Large Joint Aspiration/Injection: L knee    It band syndrome, left        Follow up in about 2 weeks (around 4/25/2023) for left knee inj f/u.    I am not certain if she has some type of lumbar radiculitis versus iliotibial band syndrome versus lateral meniscus tear.  Therefore, I did inject her left knee today via an anterior lateral approach with 40 mg of Kenalog and lidocaine as well as placing some about the distal iliotibial band at the area of maximal tenderness.  I would like to see her back relatively quickly, 2 weeks to see how she responds to this.  She has great improvement, she can simply cancel and follow-up on an as-needed basis.  If she is not much improved, I would like her to follow-up and we can further investigate her lumbar spine as a potential source of her pain.        Deni Hill, MPAS, PA-C    This note was created using MModal voice recognition  software that occasionally misinterprets words or phrases.

## 2023-04-11 NOTE — TELEPHONE ENCOUNTER
Spoke to pt's spouse about setting up AWV  Appt set up for 5/31/2023 @8:30am w/Ms Dumont at [facility]

## 2023-05-09 ENCOUNTER — OFFICE VISIT (OUTPATIENT)
Dept: FAMILY MEDICINE | Facility: CLINIC | Age: 68
End: 2023-05-09
Payer: MEDICARE

## 2023-05-09 DIAGNOSIS — R73.03 PREDIABETES: ICD-10-CM

## 2023-05-09 DIAGNOSIS — R07.81 RIB PAIN ON RIGHT SIDE: Primary | ICD-10-CM

## 2023-05-09 DIAGNOSIS — M81.0 AGE-RELATED OSTEOPOROSIS WITHOUT CURRENT PATHOLOGICAL FRACTURE: ICD-10-CM

## 2023-05-09 PROCEDURE — 99214 PR OFFICE/OUTPT VISIT, EST, LEVL IV, 30-39 MIN: ICD-10-PCS | Mod: 95,,, | Performed by: STUDENT IN AN ORGANIZED HEALTH CARE EDUCATION/TRAINING PROGRAM

## 2023-05-09 PROCEDURE — 99214 OFFICE O/P EST MOD 30 MIN: CPT | Mod: 95,,, | Performed by: STUDENT IN AN ORGANIZED HEALTH CARE EDUCATION/TRAINING PROGRAM

## 2023-05-09 NOTE — ASSESSMENT & PLAN NOTE
Continue ozempic  Lab Results   Component Value Date    HGBA1C 6.1 (H) 11/30/2021     Wt Readings from Last 3 Encounters:   04/11/23 90.3 kg (199 lb)   03/16/23 90.3 kg (199 lb)   02/28/23 90.3 kg (199 lb)

## 2023-05-09 NOTE — PROGRESS NOTES
The patient location is: [place]  The chief complaint leading to consultation is: prediabetes    Visit type: audiovisual    Face to Face time with patient: 5 min  5 minutes of total time spent on the encounter, which includes face to face time and non-face to face time preparing to see the patient (eg, review of tests), Obtaining and/or reviewing separately obtained history, Documenting clinical information in the electronic or other health record, Independently interpreting results (not separately reported) and communicating results to the patient/family/caregiver, or Care coordination (not separately reported).     Each patient to whom he or she provides medical services by telemedicine is:  (1) informed of the relationship between the physician and patient and the respective role of any other health care provider with respect to management of the patient; and (2) notified that he or she may decline to receive medical services by telemedicine and may withdraw from such care at any time.    Subjective:       Patient ID: Gloria Manuel is a 67 y.o. female.    Chief Complaint: prediabetes      Prediabetes- She is taking ozempic  She has some issues taking the entire 0.25  She is doing 0.12 twice weekly and is tolerating well  She has lost over 11 lbs.    She believes she may have cracked a rib  She was holding her small goat and the dog jumped on her and hit her onto the ground  She has pain on the right lateral ribcage area.  She tried to pull a string on the fan and had pain  This has been a week.     Review of Systems   Constitutional:  Negative for activity change and unexpected weight change.   HENT:  Negative for hearing loss, rhinorrhea and trouble swallowing.    Eyes:  Negative for discharge and visual disturbance.   Respiratory:  Negative for chest tightness and wheezing.    Cardiovascular:  Negative for chest pain and palpitations.   Gastrointestinal:  Negative for blood in stool, constipation,  diarrhea and vomiting.   Endocrine: Negative for polydipsia and polyuria.   Genitourinary:  Negative for difficulty urinating, dysuria, hematuria and menstrual problem.   Musculoskeletal:  Negative for arthralgias, joint swelling and neck pain.   Neurological:  Negative for weakness and headaches.   Psychiatric/Behavioral:  Negative for confusion and dysphoric mood.        Objective:      Physical Exam  Constitutional:       General: She is not in acute distress.     Appearance: Normal appearance. She is not ill-appearing.   Pulmonary:      Effort: Pulmonary effort is normal. No respiratory distress.   Neurological:      Mental Status: She is alert.   Psychiatric:         Mood and Affect: Mood normal.         Behavior: Behavior normal.         Thought Content: Thought content normal.         Judgment: Judgment normal.       Assessment:       1. Rib pain on right side    2. Prediabetes    3. Age-related osteoporosis without current pathological fracture        Plan:       Problem List Items Addressed This Visit          Endocrine    Prediabetes     Continue ozempic  Lab Results   Component Value Date    HGBA1C 6.1 (H) 11/30/2021     Wt Readings from Last 3 Encounters:   04/11/23 90.3 kg (199 lb)   03/16/23 90.3 kg (199 lb)   02/28/23 90.3 kg (199 lb)                 Orthopedic    Osteoporosis     Broken rib could qualify as a fragility fracture.  Xray ordered            Other Visit Diagnoses       Rib pain on right side    -  Primary    Relevant Orders    X-Ray Ribs 2 View Right

## 2023-05-10 ENCOUNTER — HOSPITAL ENCOUNTER (OUTPATIENT)
Dept: RADIOLOGY | Facility: HOSPITAL | Age: 68
Discharge: HOME | End: 2023-05-10
Attending: STUDENT IN AN ORGANIZED HEALTH CARE EDUCATION/TRAINING PROGRAM
Payer: MEDICARE

## 2023-05-10 ENCOUNTER — TELEPHONE (OUTPATIENT)
Dept: FAMILY MEDICINE | Facility: CLINIC | Age: 68
End: 2023-05-10
Payer: MEDICARE

## 2023-05-10 ENCOUNTER — PATIENT MESSAGE (OUTPATIENT)
Dept: FAMILY MEDICINE | Facility: CLINIC | Age: 68
End: 2023-05-10
Payer: MEDICARE

## 2023-05-10 DIAGNOSIS — M81.0 AGE-RELATED OSTEOPOROSIS WITHOUT CURRENT PATHOLOGICAL FRACTURE: Primary | ICD-10-CM

## 2023-05-10 DIAGNOSIS — S22.31XA CLOSED FRACTURE OF ONE RIB OF RIGHT SIDE, INITIAL ENCOUNTER: ICD-10-CM

## 2023-05-10 DIAGNOSIS — R07.81 RIB PAIN ON RIGHT SIDE: ICD-10-CM

## 2023-05-10 PROCEDURE — 71100 X-RAY EXAM RIBS UNI 2 VIEWS: CPT | Mod: TC,PN,RT

## 2023-05-10 PROCEDURE — 71100 X-RAY EXAM RIBS UNI 2 VIEWS: CPT | Mod: 26,RT,, | Performed by: RADIOLOGY

## 2023-05-10 PROCEDURE — 71100 XR RIBS 2 VIEW RIGHT: ICD-10-PCS | Mod: 26,RT,, | Performed by: RADIOLOGY

## 2023-05-31 ENCOUNTER — OFFICE VISIT (OUTPATIENT)
Dept: FAMILY MEDICINE | Facility: CLINIC | Age: 68
End: 2023-05-31
Payer: MEDICARE

## 2023-05-31 VITALS
HEIGHT: 65 IN | WEIGHT: 187 LBS | SYSTOLIC BLOOD PRESSURE: 128 MMHG | OXYGEN SATURATION: 94 % | HEART RATE: 69 BPM | BODY MASS INDEX: 31.16 KG/M2 | DIASTOLIC BLOOD PRESSURE: 72 MMHG | RESPIRATION RATE: 16 BRPM

## 2023-05-31 DIAGNOSIS — I70.0 AORTIC ATHEROSCLEROSIS: ICD-10-CM

## 2023-05-31 DIAGNOSIS — D69.2 SENILE PURPURA: ICD-10-CM

## 2023-05-31 DIAGNOSIS — F33.8 SEASONAL AFFECTIVE DISORDER: ICD-10-CM

## 2023-05-31 DIAGNOSIS — Z00.00 ENCOUNTER FOR PREVENTIVE HEALTH EXAMINATION: Primary | ICD-10-CM

## 2023-05-31 DIAGNOSIS — Z85.850 HISTORY OF THYROID CANCER: ICD-10-CM

## 2023-05-31 DIAGNOSIS — E78.2 MIXED HYPERLIPIDEMIA: ICD-10-CM

## 2023-05-31 DIAGNOSIS — T84.9XXA ORTHOPEDIC DEVICE, IMPLANT, OR GRAFT COMPLICATION: ICD-10-CM

## 2023-05-31 DIAGNOSIS — E66.9 OBESITY, CLASS I, BMI 30-34.9: ICD-10-CM

## 2023-05-31 DIAGNOSIS — R26.9 ABNORMALITY OF GAIT AND MOBILITY: ICD-10-CM

## 2023-05-31 DIAGNOSIS — R73.03 PREDIABETES: ICD-10-CM

## 2023-05-31 DIAGNOSIS — E89.0 POSTOPERATIVE HYPOTHYROIDISM: ICD-10-CM

## 2023-05-31 PROCEDURE — G0439 PR MEDICARE ANNUAL WELLNESS SUBSEQUENT VISIT: ICD-10-PCS | Mod: ,,, | Performed by: FAMILY MEDICINE

## 2023-05-31 PROCEDURE — G0439 PPPS, SUBSEQ VISIT: HCPCS | Mod: ,,, | Performed by: FAMILY MEDICINE

## 2023-05-31 NOTE — PATIENT INSTRUCTIONS
Counseling and Referral of Other Preventative  (Italic type indicates deductible and co-insurance are waived)    Patient Name: Gloria Manuel  Today's Date: 5/31/2023    Health Maintenance       Date Due Completion Date    COVID-19 Vaccine (1) Never done ---    Shingles Vaccine (1 of 2) Never done ---    High Dose Statin Never done ---    Pneumococcal Vaccines (Age 65+) (2 - PCV) 11/06/1994 11/6/1993    TETANUS VACCINE 01/01/2017 1/1/2007    Hemoglobin A1c (Prediabetes) 11/30/2022 11/30/2021    Influenza Vaccine (Season Ended) 09/01/2023 10/15/2019    Mammogram 09/08/2023 9/8/2022    Colorectal Cancer Screening 07/19/2025 7/19/2022    DEXA Scan 02/09/2026 2/9/2023    Lipid Panel 05/26/2026 5/26/2021        No orders of the defined types were placed in this encounter.    The following information is provided to all patients.  This information is to help you find resources for any of the problems found today that may be affecting your health:                Living healthy guide: www.[facility].[place].gov      Understanding Diabetes: www.diabetes.org      Eating healthy: www.cdc.gov/healthyweight      CDC home safety checklist: www.cdc.gov/steadi/patient.html      Agency on Aging: www.goea.[place].[facility]      Alcoholics anonymous (AA): www.aa.org      Physical Activity: www.teressa.nih.gov/lh9twjm      Tobacco use: www.quitwithusla.org

## 2023-05-31 NOTE — PROGRESS NOTES
"  Gloria Manuel presented for a  Medicare AWV and comprehensive Health Risk Assessment today. The following components were reviewed and updated:    Medical history  Family History  Social history  Allergies and Current Medications  Health Risk Assessment  Health Maintenance  Care Team         ** See Completed Assessments for Annual Wellness Visit within the encounter summary.**         The following assessments were completed:  Living Situation  CAGE  Depression Screening  Timed Get Up and Go  Whisper Test  Cognitive Function Screening  Nutrition Screening  ADL Screening  PAQ Screening          Vitals:    05/31/23 0806   BP: 128/72   BP Location: Left arm   Patient Position: Sitting   BP Method: Medium (Manual)   Pulse: 69   Resp: 16   SpO2: (!) 94%   Weight: 84.8 kg (187 lb)   Height: 5' 5" (1.651 m)     Body mass index is 31.12 kg/m².  Physical Exam  Vitals reviewed.   Constitutional:       Appearance: Normal appearance.   HENT:      Head: Normocephalic and atraumatic.   Eyes:      Pupils: Pupils are equal, round, and reactive to light.   Pulmonary:      Effort: Pulmonary effort is normal. No respiratory distress.   Skin:     General: Skin is warm and dry.   Neurological:      General: No focal deficit present.      Mental Status: She is alert and oriented to person, place, and time.   Psychiatric:         Mood and Affect: Mood normal.         Behavior: Behavior normal.       The 10-year ASCVD risk score (Esau BLUNT, et al., 2019) is: 8%    Values used to calculate the score:      Age: 67 years      Sex: Female      Is Non- : No      Diabetic: No      Tobacco smoker: No      Systolic Blood Pressure: 128 mmHg      Is BP treated: No      HDL Cholesterol: 57 mg/dL      Total Cholesterol: 228 mg/dL        Diagnoses and health risks identified today and associated recommendations/orders:    1. Encounter for preventive health examination    2. Obesity, Class I, BMI 30-34.9  Body mass index is " 31.12 kg/m².  Continue healthy diet and regular exercise as tolerated.  Continue medications as prescribed.  Follow up with PCP, Liliana Rangel MD     3. Mixed hyperlipidemia  Stable  Continue medications as prescribed.  Follow up with PCP     4. Seasonal affective disorder  Stable  Continue medications as prescribed.  Follow up with PCP     5. Aortic atherosclerosis  Stable  Continue medications as prescribed.  Follow up with PCP     6. Prediabetes  Stable  Continue medications as prescribed.  Follow up with PCP     7. History of thyroid cancer  Stable  Continue medications as prescribed.  Follow up with PCP and endocrinology    8. Postoperative hypothyroidism  Stable  Continue medications as prescribed.  Follow up with PCP and endo    9. Abnormality of gait and mobility  Stable without assistive device    10. Senile purpura  Stable  Continue medications as prescribed.  Follow up with PCP and dermatology, Dr Fletcher    11. Orthopedic device, implant, or graft complication  Continue medications as prescribed.  Follow up with PCP and orthopedics, Dr Hodge      Provided Gloria with a 5-10 year written screening schedule and personal prevention plan. Recommendations were developed using the USPSTF age appropriate recommendations. Education, counseling, and referrals were provided as needed. After Visit Summary printed and given to patient which includes a list of additional screenings\tests needed.    Follow up if symptoms worsen or fail to improve, for 1 year for AWV, scheduled appt.    Jenny Dumont NP        Future Appointments       Date Provider Specialty Appt Notes    7/27/2023  Lab fasting labs     8/4/2023 Liliana Rangel MD Family Medicine 6 month f/u              Review for Opioid Screening: Pt does not have Rx for Opioids/addictive substances  Review for Substance Use Disorders: Pt does not have Rx for Opioids/addictive substances     I offered to discuss advanced care planning, including how  to pick a person who would make decisions for you if you were unable to make them for yourself, called a health care power of , and what kind of decisions you might make such as use of life sustaining treatments such as ventilators and tube feeding when faced with a life limiting illness recorded on a living will that they will need to know. (How you want to be cared for as you near the end of your natural life)     X  Patient has advanced directives written and agrees to provide copies to the institution.

## 2023-06-06 ENCOUNTER — OFFICE VISIT (OUTPATIENT)
Dept: ORTHOPEDICS | Facility: CLINIC | Age: 68
End: 2023-06-06
Payer: MEDICARE

## 2023-06-06 VITALS
WEIGHT: 187 LBS | DIASTOLIC BLOOD PRESSURE: 72 MMHG | SYSTOLIC BLOOD PRESSURE: 128 MMHG | BODY MASS INDEX: 31.16 KG/M2 | HEIGHT: 65 IN

## 2023-06-06 DIAGNOSIS — Z96.642 HISTORY OF TOTAL HIP ARTHROPLASTY, LEFT: Primary | ICD-10-CM

## 2023-06-06 PROCEDURE — 99213 PR OFFICE/OUTPT VISIT, EST, LEVL III, 20-29 MIN: ICD-10-PCS | Mod: S$GLB,,, | Performed by: ORTHOPAEDIC SURGERY

## 2023-06-06 PROCEDURE — 99213 OFFICE O/P EST LOW 20 MIN: CPT | Mod: S$GLB,,, | Performed by: ORTHOPAEDIC SURGERY

## 2023-06-06 NOTE — PROGRESS NOTES
"[facility]    Subjective:     Chief Complaint:   Chief Complaint   Patient presents with    Left Hip - Pain     Pt has come in to f/u on L DARNELL 12/5/22, pain is the same, pt states it feels like it gets "hung up" at times       Past Medical History:   Diagnosis Date    Anxiety     Arthritis     Back pain     Cancer     thyroid ca and skin ca    Depression     Femoral acetabular impingement 11/06/2017    GERD (gastroesophageal reflux disease)     Migraines     MVP (mitral valve prolapse)     RLS (restless legs syndrome)     Thyroid disease     thyroidectomy    Wears dentures     UPPER AND LOWER BRIDGE    Wears glasses        Past Surgical History:   Procedure Laterality Date    BACK SURGERY      nerve stimulator    COLONOSCOPY      COLONOSCOPY N/A 07/19/2022    Procedure: COLONOSCOPY;  Surgeon: Kuldeep Gill MD;  Location: [facility] ENDO;  Service: Endoscopy;  Laterality: N/A;    HIP ARTHROPLASTY Right 06/25/2018    Procedure: ARTHROPLASTY, HIP;  Surgeon: Prosper Hodge MD;  Location: [facility] OR;  Service: Orthopedics;  Laterality: Right;  william    HIP ARTHROPLASTY Left 12/05/2022    Procedure: ARTHROPLASTY, HIP, LEFT;  Surgeon: Prosper Hodge MD;  Location: [facility] OR;  Service: Orthopedics;  Laterality: Left;    HYSTERECTOMY      implanted spinal cord stimulator      NONFUNCTIONING X 15 YEARS    JOINT REPLACEMENT  June 2017    Right Hip replacement    SACRAL NERVE STIMULATOR PLACEMENT Left     SKIN CANCER EXCISION      THYROIDECTOMY N/A 03/09/2020    Procedure: TOTAL THYROIDECTOMY;  Surgeon: Kelsie Rodgers MD;  Location: [facility] OR;  Service: General;  Laterality: N/A;    TONSILLECTOMY         Current Outpatient Medications   Medication Sig    acyclovir (ZOVIRAX) 400 MG tablet acyclovir 400 mg tablet   TAKE 1 TABLET BY MOUTH TWICE DAILY    ascorbic acid, vitamin C, (VITAMIN C) 1000 MG tablet Take 1,000 mg by mouth once daily.    ergocalciferol (ERGOCALCIFEROL) 50,000 unit Cap Take 10,000 Units by mouth once daily.    " esomeprazole magnesium (NEXIUM ORAL)     ibuprofen (ADVIL,MOTRIN) 200 MG tablet Take 1,000 mg by mouth every 6 (six) hours as needed for Pain.    rOPINIRole (REQUIP) 1 MG tablet Take 1 tablet (1 mg total) by mouth every evening.    semaglutide (OZEMPIC) 0.25 mg or 0.5 mg(2 mg/1.5 mL) pen injector Inject 0.25 mg into the skin every 7 days.    sertraline (ZOLOFT) 100 MG tablet Take 1 tablet (100 mg total) by mouth once daily.    SYNTHROID 125 mcg tablet Take 112 mcg by mouth once daily.     No current facility-administered medications for this visit.     Facility-Administered Medications Ordered in Other Visits   Medication    electrolyte-S (ISOLYTE)    electrolyte-S (ISOLYTE)    fentaNYL 50 mcg/mL injection 25 mcg    LIDOcaine (PF) 10 mg/ml (1%) injection 10 mg    triamcinolone acetonide injection 40 mg       Review of patient's allergies indicates:   Allergen Reactions    Morphine Swelling     NECK SWELLED    Demerol (pf) [meperidine (pf)] Other (See Comments)     HEART RACES, BOUNCES OFF WALL    Erythromycin Itching    Opioids - morphine analogues     Oxycodone-acetaminophen Itching       Family History   Problem Relation Age of Onset    Diabetes Father         late onset    Glaucoma Father     Diabetes Other         nephew    Colon cancer Neg Hx     Breast cancer Neg Hx     Colon polyps Neg Hx        Social History     Socioeconomic History    Marital status:     Number of children: 1   Tobacco Use    Smoking status: Former     Packs/day: 1.00     Years: 13.00     Pack years: 13.00     Types: Cigarettes     Quit date: 2009     Years since quittin.4    Smokeless tobacco: Never    Tobacco comments:     I was in high school when I started smoking - have no idea month and day, year was    Substance and Sexual Activity    Alcohol use: Yes     Comment: I drink occasionally - not every week/day.    Drug use: No    Sexual activity: Yes     Partners: Male     Birth control/protection:  Partner-Vasectomy, Post-menopausal       History of present illness:  Ms. Castro comes in today 6 months status post left total hip arthroplasty.  She is completed her physical therapy.  She is doing well.  She does have occasional lateral left hip pain but it is intermittent.  She has resumed all her regular activities of daily living without restriction.    Review of Systems:    Constitution: Negative for chills, fever, and sweats.  Negative for unexplained weight loss.    HENT:  Negative for headaches and blurry vision.    Cardiovascular:Negative for chest pain or irregular heart beat. Negative for hypertension.    Respiratory:  Negative for cough and shortness of breath.    Gastrointestinal: Negative for abdominal pain, heartburn, melena, nausea, and vomitting.    Genitourinary:  Negative bladder incontinence and dysuria.    Musculoskeletal:  See HPI for details.     Neurological: Negative for numbness.    Psychiatric/Behavioral: Negative for depression.  The patient is not nervous/anxious.      Endocrine: Negative for polyuria    Hematologic/Lymphatic: Negative for bleeding problem.  Does not bruise/bleed easily.    Skin: Negative for poor would healing and rash    Objective:      Physical Examination:    Vital Signs:    Vitals:    06/06/23 1401   BP: 128/72       Body mass index is 31.12 kg/m².    This a well-developed, well nourished patient in no acute distress.  They are alert and oriented and cooperative to examination.        Left hip exam: Skin to the left hip is clean dry and intact.  There is no erythema or ecchymosis.  Left lateral hip incision is healing well without wound dehiscence or drainage.  There are no signs or symptoms of infection.  Patient is neurovascularly intact throughout the left lower extremity.  Left calf is soft and nontender.  She has a negative straight leg raise on the left.  She has well-preserved internal/external rotation of her left hip without pain.  She has no real  "tenderness over her left greater trochanter today.  She denies any hip pain on today's visit.  She can weightbear as tolerated on the left lower extremity and ambulates with a nonantalgic gait.    Pertinent New Results:    XRAY Report / Interpretation:   A single AP view was taken of the left hip today.  Reveals no acute fractures or dislocations.  Visualized soft tissues appear unremarkable.  Patient has an anatomically placed left total hip arthroplasty without evidence of hardware failure or subsidence.    Assessment/Plan:      1. Status post left total hip arthroplasty.      Patient is doing quite well at this stage postoperatively.  She has no restrictions or limitations.  She can resume/continue all her activities of daily living without restriction.  She can follow up with us on an as-needed basis for any issues or concerns that arise.    Deni Hill, Physician Assistant, served in the capacity as a "scribe" for this patient encounter.  A "face-to-face" encounter occurred with Dr. Prosper Hodge on this date.  The treatment plan and medical decision-making is outlined above. Patient was seen and examined with a chaperone.       This note was created using Dragon voice recognition software that occasionally misinterpreted phrases or words.          "

## 2023-06-27 ENCOUNTER — PATIENT MESSAGE (OUTPATIENT)
Dept: FAMILY MEDICINE | Facility: CLINIC | Age: 68
End: 2023-06-27
Payer: MEDICARE

## 2023-07-27 ENCOUNTER — TELEPHONE (OUTPATIENT)
Dept: FAMILY MEDICINE | Facility: CLINIC | Age: 68
End: 2023-07-27
Payer: MEDICARE

## 2023-07-27 ENCOUNTER — LAB VISIT (OUTPATIENT)
Dept: LAB | Facility: CLINIC | Age: 68
End: 2023-07-27
Payer: MEDICARE

## 2023-07-27 ENCOUNTER — TELEPHONE (OUTPATIENT)
Dept: CARDIOLOGY | Facility: CLINIC | Age: 68
End: 2023-07-27
Payer: MEDICARE

## 2023-07-27 DIAGNOSIS — E55.9 VITAMIN D DEFICIENCY: ICD-10-CM

## 2023-07-27 DIAGNOSIS — E78.2 MIXED HYPERLIPIDEMIA: ICD-10-CM

## 2023-07-27 DIAGNOSIS — R73.03 PREDIABETES: ICD-10-CM

## 2023-07-27 DIAGNOSIS — E87.6 HYPOKALEMIA: Primary | ICD-10-CM

## 2023-07-27 DIAGNOSIS — E89.0 POSTOPERATIVE HYPOTHYROIDISM: ICD-10-CM

## 2023-07-27 LAB
25(OH)D3+25(OH)D2 SERPL-MCNC: 67 NG/ML (ref 30–96)
ALBUMIN SERPL BCP-MCNC: 3.1 G/DL (ref 3.5–5.2)
ALBUMIN/CREAT UR: 10.2 UG/MG (ref 0–30)
ALP SERPL-CCNC: 67 U/L (ref 55–135)
ALT SERPL W/O P-5'-P-CCNC: 10 U/L (ref 10–44)
ANION GAP SERPL CALC-SCNC: 13 MMOL/L (ref 8–16)
AST SERPL-CCNC: 13 U/L (ref 10–40)
BASOPHILS # BLD AUTO: 0.04 K/UL (ref 0–0.2)
BASOPHILS NFR BLD: 0.5 % (ref 0–1.9)
BILIRUB SERPL-MCNC: 0.5 MG/DL (ref 0.1–1)
BUN SERPL-MCNC: 7 MG/DL (ref 8–23)
CALCIUM SERPL-MCNC: 9.3 MG/DL (ref 8.7–10.5)
CHLORIDE SERPL-SCNC: 105 MMOL/L (ref 95–110)
CHOLEST SERPL-MCNC: 256 MG/DL (ref 120–199)
CHOLEST/HDLC SERPL: 4.7 {RATIO} (ref 2–5)
CO2 SERPL-SCNC: 26 MMOL/L (ref 23–29)
CREAT SERPL-MCNC: 0.7 MG/DL (ref 0.5–1.4)
CREAT UR-MCNC: 118 MG/DL (ref 15–325)
DIFFERENTIAL METHOD: ABNORMAL
EOSINOPHIL # BLD AUTO: 0.2 K/UL (ref 0–0.5)
EOSINOPHIL NFR BLD: 2.1 % (ref 0–8)
ERYTHROCYTE [DISTWIDTH] IN BLOOD BY AUTOMATED COUNT: 13.8 % (ref 11.5–14.5)
EST. GFR  (NO RACE VARIABLE): >60 ML/MIN/1.73 M^2
ESTIMATED AVG GLUCOSE: 108 MG/DL (ref 68–131)
GLUCOSE SERPL-MCNC: 96 MG/DL (ref 70–110)
HBA1C MFR BLD: 5.4 % (ref 4–5.6)
HCT VFR BLD AUTO: 39.8 % (ref 37–48.5)
HDLC SERPL-MCNC: 54 MG/DL (ref 40–75)
HDLC SERPL: 21.1 % (ref 20–50)
HGB BLD-MCNC: 12.9 G/DL (ref 12–16)
IMM GRANULOCYTES # BLD AUTO: 0.02 K/UL (ref 0–0.04)
IMM GRANULOCYTES NFR BLD AUTO: 0.3 % (ref 0–0.5)
LDLC SERPL CALC-MCNC: 172.8 MG/DL (ref 63–159)
LYMPHOCYTES # BLD AUTO: 1.2 K/UL (ref 1–4.8)
LYMPHOCYTES NFR BLD: 14.5 % (ref 18–48)
MCH RBC QN AUTO: 30.4 PG (ref 27–31)
MCHC RBC AUTO-ENTMCNC: 32.4 G/DL (ref 32–36)
MCV RBC AUTO: 94 FL (ref 82–98)
MICROALBUMIN UR DL<=1MG/L-MCNC: 12 UG/ML
MONOCYTES # BLD AUTO: 0.5 K/UL (ref 0.3–1)
MONOCYTES NFR BLD: 5.8 % (ref 4–15)
NEUTROPHILS # BLD AUTO: 6.1 K/UL (ref 1.8–7.7)
NEUTROPHILS NFR BLD: 76.8 % (ref 38–73)
NONHDLC SERPL-MCNC: 202 MG/DL
NRBC BLD-RTO: 0 /100 WBC
PLATELET # BLD AUTO: 321 K/UL (ref 150–450)
PMV BLD AUTO: 10.7 FL (ref 9.2–12.9)
POTASSIUM SERPL-SCNC: 2.6 MMOL/L (ref 3.5–5.1)
PROT SERPL-MCNC: 6.4 G/DL (ref 6–8.4)
RBC # BLD AUTO: 4.25 M/UL (ref 4–5.4)
SODIUM SERPL-SCNC: 144 MMOL/L (ref 136–145)
T4 FREE SERPL-MCNC: 0.93 NG/DL (ref 0.71–1.51)
TRIGL SERPL-MCNC: 146 MG/DL (ref 30–150)
TSH SERPL DL<=0.005 MIU/L-ACNC: 10.97 UIU/ML (ref 0.4–4)
WBC # BLD AUTO: 7.91 K/UL (ref 3.9–12.7)

## 2023-07-27 PROCEDURE — 82306 VITAMIN D 25 HYDROXY: CPT | Performed by: STUDENT IN AN ORGANIZED HEALTH CARE EDUCATION/TRAINING PROGRAM

## 2023-07-27 PROCEDURE — 85025 COMPLETE CBC W/AUTO DIFF WBC: CPT | Performed by: STUDENT IN AN ORGANIZED HEALTH CARE EDUCATION/TRAINING PROGRAM

## 2023-07-27 PROCEDURE — 84439 ASSAY OF FREE THYROXINE: CPT | Performed by: STUDENT IN AN ORGANIZED HEALTH CARE EDUCATION/TRAINING PROGRAM

## 2023-07-27 PROCEDURE — 80061 LIPID PANEL: CPT | Performed by: STUDENT IN AN ORGANIZED HEALTH CARE EDUCATION/TRAINING PROGRAM

## 2023-07-27 PROCEDURE — 82570 ASSAY OF URINE CREATININE: CPT | Performed by: STUDENT IN AN ORGANIZED HEALTH CARE EDUCATION/TRAINING PROGRAM

## 2023-07-27 PROCEDURE — 83036 HEMOGLOBIN GLYCOSYLATED A1C: CPT | Performed by: STUDENT IN AN ORGANIZED HEALTH CARE EDUCATION/TRAINING PROGRAM

## 2023-07-27 PROCEDURE — 84443 ASSAY THYROID STIM HORMONE: CPT | Performed by: STUDENT IN AN ORGANIZED HEALTH CARE EDUCATION/TRAINING PROGRAM

## 2023-07-27 PROCEDURE — 80053 COMPREHEN METABOLIC PANEL: CPT | Performed by: STUDENT IN AN ORGANIZED HEALTH CARE EDUCATION/TRAINING PROGRAM

## 2023-07-27 RX ORDER — POTASSIUM CHLORIDE 20 MEQ/1
20 TABLET, EXTENDED RELEASE ORAL 2 TIMES DAILY
Qty: 14 TABLET | Refills: 0 | Status: SHIPPED | OUTPATIENT
Start: 2023-07-27 | End: 2023-08-04

## 2023-07-27 NOTE — TELEPHONE ENCOUNTER
Contacted patient in regards of lab results, verified . Patient verbalizes understanding and denies any further questions or concerns.       Pt will complete blood work one day next week, did not wish to schedule

## 2023-07-27 NOTE — TELEPHONE ENCOUNTER
Low potassium.   Replace with oral 20meq by mouth twice daily x 1 week. Sent to pharmacy.   Also needs repeat labs next week. Including a magnesium.

## 2023-07-27 NOTE — TELEPHONE ENCOUNTER
Received a call from the lab that the pt had a critical value on Potassium at 2.6. Dr. Rangel is out today so sending this to the attending Dr. Dial and verbalizing this to her as well.

## 2023-07-28 NOTE — TELEPHONE ENCOUNTER
Dr. ORELLANA just wanted to make you aware that I received a critical result on your patient today. Sent in potassium replacement and ordered a BMP and Mg to be done next week.

## 2023-08-04 ENCOUNTER — OFFICE VISIT (OUTPATIENT)
Dept: FAMILY MEDICINE | Facility: CLINIC | Age: 68
End: 2023-08-04
Payer: MEDICARE

## 2023-08-04 VITALS
OXYGEN SATURATION: 96 % | SYSTOLIC BLOOD PRESSURE: 128 MMHG | TEMPERATURE: 98 F | RESPIRATION RATE: 16 BRPM | WEIGHT: 173.63 LBS | HEART RATE: 75 BPM | DIASTOLIC BLOOD PRESSURE: 80 MMHG | HEIGHT: 65 IN | BODY MASS INDEX: 28.93 KG/M2

## 2023-08-04 DIAGNOSIS — Z12.31 SCREENING MAMMOGRAM FOR BREAST CANCER: ICD-10-CM

## 2023-08-04 DIAGNOSIS — K21.00 GASTROESOPHAGEAL REFLUX DISEASE WITH ESOPHAGITIS WITHOUT HEMORRHAGE: ICD-10-CM

## 2023-08-04 DIAGNOSIS — R73.03 PREDIABETES: ICD-10-CM

## 2023-08-04 DIAGNOSIS — E78.2 MIXED HYPERLIPIDEMIA: Primary | ICD-10-CM

## 2023-08-04 DIAGNOSIS — B00.1 HERPES LABIALIS: ICD-10-CM

## 2023-08-04 DIAGNOSIS — F33.1 MODERATE EPISODE OF RECURRENT MAJOR DEPRESSIVE DISORDER: ICD-10-CM

## 2023-08-04 DIAGNOSIS — B00.1 FEVER BLISTER: ICD-10-CM

## 2023-08-04 DIAGNOSIS — I70.0 AORTIC ATHEROSCLEROSIS: ICD-10-CM

## 2023-08-04 DIAGNOSIS — E89.0 POSTOPERATIVE HYPOTHYROIDISM: ICD-10-CM

## 2023-08-04 DIAGNOSIS — E66.09 CLASS 1 OBESITY DUE TO EXCESS CALORIES WITHOUT SERIOUS COMORBIDITY WITH BODY MASS INDEX (BMI) OF 33.0 TO 33.9 IN ADULT: ICD-10-CM

## 2023-08-04 DIAGNOSIS — J43.2 CENTRILOBULAR EMPHYSEMA: ICD-10-CM

## 2023-08-04 PROCEDURE — 99214 OFFICE O/P EST MOD 30 MIN: CPT | Mod: S$PBB,,, | Performed by: STUDENT IN AN ORGANIZED HEALTH CARE EDUCATION/TRAINING PROGRAM

## 2023-08-04 PROCEDURE — 99999 PR PBB SHADOW E&M-EST. PATIENT-LVL IV: ICD-10-PCS | Mod: PBBFAC,,, | Performed by: STUDENT IN AN ORGANIZED HEALTH CARE EDUCATION/TRAINING PROGRAM

## 2023-08-04 PROCEDURE — 99999 PR PBB SHADOW E&M-EST. PATIENT-LVL IV: CPT | Mod: PBBFAC,,, | Performed by: STUDENT IN AN ORGANIZED HEALTH CARE EDUCATION/TRAINING PROGRAM

## 2023-08-04 PROCEDURE — 99214 PR OFFICE/OUTPT VISIT, EST, LEVL IV, 30-39 MIN: ICD-10-PCS | Mod: S$PBB,,, | Performed by: STUDENT IN AN ORGANIZED HEALTH CARE EDUCATION/TRAINING PROGRAM

## 2023-08-04 PROCEDURE — 99214 OFFICE O/P EST MOD 30 MIN: CPT | Mod: PBBFAC,PN | Performed by: STUDENT IN AN ORGANIZED HEALTH CARE EDUCATION/TRAINING PROGRAM

## 2023-08-04 RX ORDER — ACYCLOVIR 400 MG/1
400 TABLET ORAL 2 TIMES DAILY
Qty: 14 TABLET | Refills: 2 | Status: SHIPPED | OUTPATIENT
Start: 2023-08-04 | End: 2024-03-15 | Stop reason: SDUPTHER

## 2023-08-04 NOTE — PROGRESS NOTES
Subjective:       Patient ID: Gloria Manuel is a 67 y.o. female.    Chief Complaint: Follow-up (With refills)    Patient Active Problem List:     Osteoarthritis of right hip     Depression     RLS (restless legs syndrome)     Postoperative hypothyroidism     Seasonal affective disorder     Osteoporosis     Mixed hyperlipidemia     Gastro-esophageal reflux disease with esophagitis     History of thyroid cancer     Herpes labialis     Class 1 obesity due to excess calories without serious comorbidity with body mass index (BMI) of 33.0 to 33.9 in adult     Prediabetes     Aortic atherosclerosis     Centrilobular emphysema    Current Outpatient Medications:  ascorbic acid, vitamin C, (VITAMIN C) 1000 MG tablet, Take 1,000 mg by mouth once daily.  ergocalciferol (ERGOCALCIFEROL) 50,000 unit Cap, Take 10,000 Units by mouth once daily.  esomeprazole magnesium (NEXIUM ORAL),   ibuprofen (ADVIL,MOTRIN) 200 MG tablet, Take 1,000 mg by mouth every 6 (six) hours as needed for Pain.  potassium chloride SA (K-DUR,KLOR-CON) 20 MEQ tablet, Take 1 tablet (20 mEq total) by mouth 2 (two) times daily. for 7 days  rOPINIRole (REQUIP) 1 MG tablet, Take 1 tablet (1 mg total) by mouth every evening.  semaglutide (OZEMPIC) 0.25 mg or 0.5 mg(2 mg/1.5 mL) pen injector, Inject 0.25 mg into the skin every 7 days.  sertraline (ZOLOFT) 100 MG tablet, Take 1 tablet (100 mg total) by mouth once daily.  SYNTHROID 125 mcg tablet, Take 112 mcg by mouth once daily.  acyclovir (ZOVIRAX) 400 MG tablet, Take 1 tablet (400 mg total) by mouth 2 (two) times daily.    No current facility-administered medications for this visit.  Facility-Administered Medications Ordered in Other Visits:  electrolyte-S (ISOLYTE)  electrolyte-S (ISOLYTE)  fentaNYL 50 mcg/mL injection 25 mcg  LIDOcaine (PF) 10 mg/ml (1%) injection 10 mg  triamcinolone acetonide injection 40 mg      She had a stomach virus and was ill with diarrhea for over 10 days  She is nowback on  medications      Review of Systems   Constitutional:  Negative for activity change and appetite change.   Respiratory:  Negative for shortness of breath.    Cardiovascular:  Negative for chest pain.   Gastrointestinal:  Negative for abdominal pain.   Genitourinary:  Negative for dysuria.   Integumentary:  Negative for rash.   Psychiatric/Behavioral:  Negative for dysphoric mood and sleep disturbance. The patient is not nervous/anxious.          Objective:      Physical Exam  Constitutional:       General: She is not in acute distress.     Appearance: Normal appearance. She is not ill-appearing.   Eyes:      Conjunctiva/sclera: Conjunctivae normal.   Cardiovascular:      Rate and Rhythm: Normal rate and regular rhythm.      Heart sounds: Normal heart sounds. No murmur heard.  Pulmonary:      Effort: Pulmonary effort is normal. No respiratory distress.      Breath sounds: Normal breath sounds.   Musculoskeletal:      Right lower leg: No edema.      Left lower leg: No edema.   Skin:     General: Skin is warm and dry.   Neurological:      Mental Status: She is alert. Mental status is at baseline.      Gait: Gait normal.   Psychiatric:         Mood and Affect: Mood normal.         Behavior: Behavior normal.         Thought Content: Thought content normal.         Judgment: Judgment normal.         Assessment:       1. Mixed hyperlipidemia    2. Class 1 obesity due to excess calories without serious comorbidity with body mass index (BMI) of 33.0 to 33.9 in adult    3. Prediabetes    4. Postoperative hypothyroidism    5. Gastroesophageal reflux disease with esophagitis without hemorrhage    6. Aortic atherosclerosis    7. Centrilobular emphysema    8. Moderate episode of recurrent major depressive disorder    9. Fever blister    10. Herpes labialis    11. Screening mammogram for breast cancer        Plan:       Problem List Items Addressed This Visit          Psychiatric    Depression     Stable. Continue current  "medications and regular followup.              Pulmonary    Centrilobular emphysema     Stable. Continue current medications and regular followup.              Cardiac/Vascular    Mixed hyperlipidemia - Primary     Lab Results   Component Value Date    CHOL 256 (H) 07/27/2023    CHOL 228 (H) 05/26/2021     Lab Results   Component Value Date    HDL 54 07/27/2023    HDL 57 05/26/2021     Lab Results   Component Value Date    LDLCALC 172.8 (H) 07/27/2023    LDLCALC 150.0 05/26/2021   No results found for: "DLDL"  Lab Results   Component Value Date    TRIG 146 07/27/2023    TRIG 105 05/26/2021     f1   Lab Results   Component Value Date    CHOLHDL 21.1 07/27/2023    CHOLHDL 25.0 05/26/2021   The 10-year ASCVD risk score (Esau BLUNT, et al., 2019) is: 8.5%    Values used to calculate the score:      Age: 67 years      Sex: Female      Is Non- : No      Diabetic: No      Tobacco smoker: No      Systolic Blood Pressure: 128 mmHg      Is BP treated: No      HDL Cholesterol: 54 mg/dL      Total Cholesterol: 256 mg/dL             Aortic atherosclerosis     On risk factor management            ID    Herpes labialis     Refill her valtrex            Endocrine    Postoperative hypothyroidism     She missed some doses  Lab Results   Component Value Date    TSH 10.973 (H) 07/27/2023     Continue synthroid         Relevant Orders    TSH    Class 1 obesity due to excess calories without serious comorbidity with body mass index (BMI) of 33.0 to 33.9 in adult     Loosing weight on ozempic  Wt Readings from Last 6 Encounters:   08/04/23 78.7 kg (173 lb 9.6 oz)   06/06/23 84.8 kg (187 lb)   05/31/23 84.8 kg (187 lb)   04/11/23 90.3 kg (199 lb)   03/16/23 90.3 kg (199 lb)   02/28/23 90.3 kg (199 lb)              Prediabetes     Lab Results   Component Value Date    HGBA1C 5.4 07/27/2023   Continue ozempic            GI    Gastro-esophageal reflux disease with esophagitis     Stable. Continue current medications and " regular followup.            Other Visit Diagnoses       Fever blister        Relevant Medications    acyclovir (ZOVIRAX) 400 MG tablet    Screening mammogram for breast cancer        Relevant Orders    Mammo Digital Screening Bilat w/ Sha

## 2023-08-04 NOTE — ASSESSMENT & PLAN NOTE
Loosing weight on ozempic  Wt Readings from Last 6 Encounters:   08/04/23 78.7 kg (173 lb 9.6 oz)   06/06/23 84.8 kg (187 lb)   05/31/23 84.8 kg (187 lb)   04/11/23 90.3 kg (199 lb)   03/16/23 90.3 kg (199 lb)   02/28/23 90.3 kg (199 lb)

## 2023-08-04 NOTE — ASSESSMENT & PLAN NOTE
She missed some doses  Lab Results   Component Value Date    TSH 10.973 (H) 07/27/2023     Continue synthroid

## 2023-08-04 NOTE — ASSESSMENT & PLAN NOTE
"Lab Results   Component Value Date    CHOL 256 (H) 07/27/2023    CHOL 228 (H) 05/26/2021     Lab Results   Component Value Date    HDL 54 07/27/2023    HDL 57 05/26/2021     Lab Results   Component Value Date    LDLCALC 172.8 (H) 07/27/2023    LDLCALC 150.0 05/26/2021     No results found for: "DLDL"  Lab Results   Component Value Date    TRIG 146 07/27/2023    TRIG 105 05/26/2021       f1   Lab Results   Component Value Date    CHOLHDL 21.1 07/27/2023    CHOLHDL 25.0 05/26/2021     The 10-year ASCVD risk score (Esau BLUNT, et al., 2019) is: 8.5%    Values used to calculate the score:      Age: 67 years      Sex: Female      Is Non- : No      Diabetic: No      Tobacco smoker: No      Systolic Blood Pressure: 128 mmHg      Is BP treated: No      HDL Cholesterol: 54 mg/dL      Total Cholesterol: 256 mg/dL      "

## 2023-08-08 ENCOUNTER — LAB VISIT (OUTPATIENT)
Dept: LAB | Facility: HOSPITAL | Age: 68
End: 2023-08-08
Attending: FAMILY MEDICINE
Payer: MEDICARE

## 2023-08-08 DIAGNOSIS — E89.0 POSTOPERATIVE HYPOTHYROIDISM: ICD-10-CM

## 2023-08-08 DIAGNOSIS — E87.6 HYPOKALEMIA: ICD-10-CM

## 2023-08-08 LAB
ANION GAP SERPL CALC-SCNC: 11 MMOL/L (ref 8–16)
BUN SERPL-MCNC: 10 MG/DL (ref 8–23)
CALCIUM SERPL-MCNC: 9.9 MG/DL (ref 8.7–10.5)
CHLORIDE SERPL-SCNC: 103 MMOL/L (ref 95–110)
CO2 SERPL-SCNC: 23 MMOL/L (ref 23–29)
CREAT SERPL-MCNC: 0.9 MG/DL (ref 0.5–1.4)
EST. GFR  (NO RACE VARIABLE): >60 ML/MIN/1.73 M^2
GLUCOSE SERPL-MCNC: 89 MG/DL (ref 70–110)
MAGNESIUM SERPL-MCNC: 1.8 MG/DL (ref 1.6–2.6)
POTASSIUM SERPL-SCNC: 3.7 MMOL/L (ref 3.5–5.1)
SODIUM SERPL-SCNC: 137 MMOL/L (ref 136–145)
T4 FREE SERPL-MCNC: 0.91 NG/DL (ref 0.71–1.51)
TSH SERPL DL<=0.005 MIU/L-ACNC: 7.06 UIU/ML (ref 0.4–4)

## 2023-08-08 PROCEDURE — 36415 COLL VENOUS BLD VENIPUNCTURE: CPT | Performed by: STUDENT IN AN ORGANIZED HEALTH CARE EDUCATION/TRAINING PROGRAM

## 2023-08-08 PROCEDURE — 84443 ASSAY THYROID STIM HORMONE: CPT | Performed by: STUDENT IN AN ORGANIZED HEALTH CARE EDUCATION/TRAINING PROGRAM

## 2023-08-08 PROCEDURE — 80048 BASIC METABOLIC PNL TOTAL CA: CPT | Performed by: FAMILY MEDICINE

## 2023-08-08 PROCEDURE — 84439 ASSAY OF FREE THYROXINE: CPT | Performed by: FAMILY MEDICINE

## 2023-08-08 PROCEDURE — 83735 ASSAY OF MAGNESIUM: CPT | Performed by: FAMILY MEDICINE

## 2023-08-11 DIAGNOSIS — E89.0 POSTOPERATIVE HYPOTHYROIDISM: Primary | ICD-10-CM

## 2023-09-06 ENCOUNTER — PATIENT MESSAGE (OUTPATIENT)
Dept: ORTHOPEDICS | Facility: CLINIC | Age: 68
End: 2023-09-06

## 2023-09-07 ENCOUNTER — PATIENT MESSAGE (OUTPATIENT)
Dept: FAMILY MEDICINE | Facility: CLINIC | Age: 68
End: 2023-09-07
Payer: MEDICARE

## 2023-09-07 DIAGNOSIS — E78.2 MIXED HYPERLIPIDEMIA: Primary | ICD-10-CM

## 2023-09-11 ENCOUNTER — OFFICE VISIT (OUTPATIENT)
Dept: ORTHOPEDICS | Facility: CLINIC | Age: 68
End: 2023-09-11
Payer: MEDICARE

## 2023-09-11 VITALS — HEIGHT: 65 IN | BODY MASS INDEX: 27.99 KG/M2 | WEIGHT: 168 LBS

## 2023-09-11 DIAGNOSIS — M17.11 PRIMARY OSTEOARTHRITIS OF RIGHT KNEE: Primary | ICD-10-CM

## 2023-09-11 DIAGNOSIS — S83.241A TEAR OF MEDIAL MENISCUS OF RIGHT KNEE, CURRENT, UNSPECIFIED TEAR TYPE, INITIAL ENCOUNTER: ICD-10-CM

## 2023-09-11 PROCEDURE — 99213 OFFICE O/P EST LOW 20 MIN: CPT | Mod: S$GLB,,, | Performed by: PHYSICIAN ASSISTANT

## 2023-09-11 PROCEDURE — 99213 PR OFFICE/OUTPT VISIT, EST, LEVL III, 20-29 MIN: ICD-10-PCS | Mod: S$GLB,,, | Performed by: PHYSICIAN ASSISTANT

## 2023-09-11 RX ORDER — METHYLPREDNISOLONE 4 MG/1
TABLET ORAL
Qty: 21 EACH | Refills: 0 | Status: SHIPPED | OUTPATIENT
Start: 2023-09-11 | End: 2023-10-02

## 2023-09-11 NOTE — PROGRESS NOTES
[facility]  [address]  [place] [address]  Phone: [phone]   [phone]    Patient's PCP: Liliana Rangel MD  Referring Provider: No ref. provider found    Subjective:      Chief Complaint:   Chief Complaint   Patient presents with    Left Knee - Pain     Bilateral knees, right worse left, c/o pain, no swelling, popping, gives way with popping    Right Knee - Pain       Past Medical History:   Diagnosis Date    Anxiety     Arthritis     Back pain     Cancer     thyroid ca and skin ca    Depression     Femoral acetabular impingement 11/06/2017    GERD (gastroesophageal reflux disease)     Migraines     MVP (mitral valve prolapse)     RLS (restless legs syndrome)     Thyroid disease     thyroidectomy    Wears dentures     UPPER AND LOWER BRIDGE    Wears glasses        Past Surgical History:   Procedure Laterality Date    BACK SURGERY      nerve stimulator    COLONOSCOPY      COLONOSCOPY N/A 07/19/2022    Procedure: COLONOSCOPY;  Surgeon: Kuldeep Gill MD;  Location: [facility];  Service: Endoscopy;  Laterality: N/A;    HIP ARTHROPLASTY Right 06/25/2018    Procedure: ARTHROPLASTY, HIP;  Surgeon: Prosper Hodge MD;  Location: [facility] OR;  Service: Orthopedics;  Laterality: Right;  william    HIP ARTHROPLASTY Left 12/05/2022    Procedure: ARTHROPLASTY, HIP, LEFT;  Surgeon: Prosper Hodge MD;  Location: [facility] OR;  Service: Orthopedics;  Laterality: Left;    HYSTERECTOMY      implanted spinal cord stimulator      NONFUNCTIONING X 15 YEARS    JOINT REPLACEMENT  June 2017    Right Hip replacement    SACRAL NERVE STIMULATOR PLACEMENT Left     SKIN CANCER EXCISION      THYROIDECTOMY N/A 03/09/2020    Procedure: TOTAL THYROIDECTOMY;  Surgeon: Kelsie Rodgers MD;  Location: [facility] OR;  Service: General;  Laterality: N/A;    TONSILLECTOMY         Current Outpatient Medications   Medication Sig    acyclovir (ZOVIRAX) 400 MG tablet Take 1 tablet (400 mg total) by mouth 2 (two) times daily.     ascorbic acid, vitamin C, (VITAMIN C) 1000 MG tablet Take 1,000 mg by mouth once daily.    BERGAMOT EXTRACT ORAL Take by mouth.    ergocalciferol (ERGOCALCIFEROL) 50,000 unit Cap Take 10,000 Units by mouth once daily.    esomeprazole magnesium (NEXIUM ORAL)     ibuprofen (ADVIL,MOTRIN) 200 MG tablet Take 1,000 mg by mouth every 6 (six) hours as needed for Pain.    phytonadione, vit K1, (VITAMIN K1 MISC) by Misc.(Non-Drug; Combo Route) route.    rOPINIRole (REQUIP) 1 MG tablet Take 1 tablet (1 mg total) by mouth every evening.    semaglutide (OZEMPIC) 0.25 mg or 0.5 mg(2 mg/1.5 mL) pen injector Inject 0.25 mg into the skin every 7 days.    sertraline (ZOLOFT) 100 MG tablet Take 1 tablet (100 mg total) by mouth once daily.    SYNTHROID 125 mcg tablet Take 112 mcg by mouth once daily.    methylPREDNISolone (MEDROL DOSEPACK) 4 mg tablet use as directed     No current facility-administered medications for this visit.     Facility-Administered Medications Ordered in Other Visits   Medication    electrolyte-S (ISOLYTE)    electrolyte-S (ISOLYTE)    fentaNYL 50 mcg/mL injection 25 mcg    LIDOcaine (PF) 10 mg/ml (1%) injection 10 mg    triamcinolone acetonide injection 40 mg       Review of patient's allergies indicates:   Allergen Reactions    Morphine Swelling     NECK SWELLED    Demerol (pf) [meperidine (pf)] Other (See Comments)     HEART RACES, BOUNCES OFF WALL    Erythromycin Itching    Opioids - morphine analogues     Oxycodone-acetaminophen Itching       Family History   Problem Relation Age of Onset    Diabetes Father         late onset    Glaucoma Father     Diabetes Other         nephew    Colon cancer Neg Hx     Breast cancer Neg Hx     Colon polyps Neg Hx        Social History     Socioeconomic History    Marital status:     Number of children: 1   Tobacco Use    Smoking status: Former     Current packs/day: 0.00     Average packs/day: 1 pack/day for 13.0 years (13.0 ttl pk-yrs)     Types: Cigarettes      Start date: 1996     Quit date: 2009     Years since quittin.6    Smokeless tobacco: Never    Tobacco comments:     I was in high school when I started smoking - have no idea month and day, year was    Substance and Sexual Activity    Alcohol use: Yes     Comment: I drink occasionally - not every week/day.    Drug use: No    Sexual activity: Yes     Partners: Male     Birth control/protection: Partner-Vasectomy, Post-menopausal       History of present illness: Ms Castro comes in today for follow-up her right knee.  She has had right knee symptoms quite some time.  We have injected this knee in 4 separate occasions over the past year or so.  She continues to have feelings of instability in the knee and symptoms of buckling wanting give way.  This does fluctuate in severity.    Review of Systems:    Constitutional: Negative for chills, fever and weight loss.   HENT: Negative for congestion.    Eyes: Negative for discharge and redness.   Respiratory: Negative for cough and shortness of breath.    Cardiovascular: Negative for chest pain.   Gastrointestinal: Negative for nausea and vomiting.   Musculoskeletal: See HPI.   Skin: Negative for rash.   Neurological: Negative for headaches.   Endo/Heme/Allergies: Does not bruise/bleed easily.   Psychiatric/Behavioral: The patient is not nervous/anxious.    All other systems reviewed and are negative.       Objective:      Physical Examination:    Vital Signs:  There were no vitals filed for this visit.    Body mass index is 27.96 kg/m².    This a well-developed, well nourished patient in no acute distress.  They are alert and oriented and cooperative to examination.     Right knee exam:  Her right knee exam is similar to where she was on previous visits with us. Skin to her right knee is clean dry and intact.  There is no erythema or ecchymosis.  There are no signs or symptoms of infection.  Patient is neurovascularly intact throughout the right lower  extremity.  Right calf is soft and nontender.  Right knee active range of motion is approximately 0-110 degrees.  Right knee is stable to varus and valgus stresses while held in extension.  She does have some tenderness to palpation along the medial aspect specifically along the joint line.  She does have a mild effusion to the right knee.  She can weightbear as tolerated on her right lower extremity but has a mildly antalgic gait.  She does have a positive Yvette's to the right knee with reproduction of pain along the medial aspect but no palpable pop or click.    Pertinent New Results:        XRAY Report / Interpretation:   Three views taken of bilateral knees today:  AP, lateral, and sunrise views.  They reveal no acute fractures or dislocations bilaterally.  Visualized soft tissues appear unremarkable bilaterally.  She has mild tricompartmental arthrosis.      Assessment:       1. Primary osteoarthritis of right knee    2. Tear of medial meniscus of right knee, current, unspecified tear type, initial encounter      Plan:     Primary osteoarthritis of right knee  -     Cancel: X-Ray Knee 3 View Right  -     X-Ray Knee 3 View Bilateral  -     CT Arthrogram Knee Right; Future; Expected date: 09/11/2023  -     XR Arthrogram Knee Right with CT to Follow (XPD); Future; Expected date: 09/11/2023  -     methylPREDNISolone (MEDROL DOSEPACK) 4 mg tablet; use as directed  Dispense: 21 each; Refill: 0    Tear of medial meniscus of right knee, current, unspecified tear type, initial encounter  -     CT Arthrogram Knee Right; Future; Expected date: 09/11/2023  -     XR Arthrogram Knee Right with CT to Follow (XPD); Future; Expected date: 09/11/2023        Follow up in about 2 weeks (around 9/25/2023) for CT Results Right knee , Tues/Thurs.    Patient has had multiple corticosteroid injections in his knee over the past year or so.  Unfortunately, she keeps having a recurrence of her symptoms of instability in the knee  wanting to buckle and give way.  She does have signs and symptoms consistent with a medial meniscus tear.  She does not have severe arthrosis in the knee.  I would like to proceed with a CT arthrogram of the knee to evaluate for a medial meniscus tear.  She can not tolerate an MRI secondary to an implanted spinal stimulator.  I will place her on a Medrol Dosepak as I do not want to inject her knee today and potentially alter the CT arthrogram study that will likely occur in the next couple of days.  We will see her back with those results and make further orthopedic treatment recommendations from there.        Deni Hill, MPAS, PA-C    This note was created using Myt[employer] voice recognition software that occasionally misinterprets words or phrases.

## 2023-09-12 RX ORDER — ATORVASTATIN CALCIUM 40 MG/1
40 TABLET, FILM COATED ORAL DAILY
Qty: 90 TABLET | Refills: 3 | Status: SHIPPED | OUTPATIENT
Start: 2023-09-12 | End: 2024-09-11

## 2023-09-13 ENCOUNTER — HOSPITAL ENCOUNTER (OUTPATIENT)
Dept: RADIOLOGY | Facility: HOSPITAL | Age: 68
Discharge: HOME | End: 2023-09-13
Attending: STUDENT IN AN ORGANIZED HEALTH CARE EDUCATION/TRAINING PROGRAM
Payer: MEDICARE

## 2023-09-13 DIAGNOSIS — Z12.31 SCREENING MAMMOGRAM FOR BREAST CANCER: ICD-10-CM

## 2023-09-13 PROCEDURE — 77067 SCR MAMMO BI INCL CAD: CPT | Mod: TC

## 2023-09-13 PROCEDURE — 77063 MAMMO DIGITAL SCREENING BILAT WITH TOMO: ICD-10-PCS | Mod: 26,,, | Performed by: RADIOLOGY

## 2023-09-13 PROCEDURE — 77063 BREAST TOMOSYNTHESIS BI: CPT | Mod: 26,,, | Performed by: RADIOLOGY

## 2023-09-13 PROCEDURE — 77067 SCR MAMMO BI INCL CAD: CPT | Mod: 26,,, | Performed by: RADIOLOGY

## 2023-09-13 PROCEDURE — 77067 MAMMO DIGITAL SCREENING BILAT WITH TOMO: ICD-10-PCS | Mod: 26,,, | Performed by: RADIOLOGY

## 2023-09-27 ENCOUNTER — HOSPITAL ENCOUNTER (OUTPATIENT)
Dept: RADIOLOGY | Facility: HOSPITAL | Age: 68
Discharge: HOME | End: 2023-09-27
Attending: PHYSICIAN ASSISTANT
Payer: MEDICARE

## 2023-09-27 DIAGNOSIS — S83.241A TEAR OF MEDIAL MENISCUS OF RIGHT KNEE, CURRENT, UNSPECIFIED TEAR TYPE, INITIAL ENCOUNTER: ICD-10-CM

## 2023-09-27 DIAGNOSIS — M17.11 PRIMARY OSTEOARTHRITIS OF RIGHT KNEE: ICD-10-CM

## 2023-09-27 PROCEDURE — 25500020 PHARM REV CODE 255: Performed by: PHYSICIAN ASSISTANT

## 2023-09-27 PROCEDURE — 73580 CONTRAST X-RAY OF KNEE JOINT: CPT | Mod: TC,RT

## 2023-09-27 PROCEDURE — 25000003 PHARM REV CODE 250: Performed by: PHYSICIAN ASSISTANT

## 2023-09-27 PROCEDURE — 73701 CT LOWER EXTREMITY W/DYE: CPT | Mod: TC,RT

## 2023-09-27 RX ORDER — LIDOCAINE HYDROCHLORIDE 10 MG/ML
5 INJECTION INFILTRATION; PERINEURAL ONCE
Status: COMPLETED | OUTPATIENT
Start: 2023-09-27 | End: 2023-09-27

## 2023-09-27 RX ADMIN — IOHEXOL 10 ML: 300 INJECTION, SOLUTION INTRAVENOUS at 10:09

## 2023-09-27 RX ADMIN — LIDOCAINE HYDROCHLORIDE 5 ML: 10 INJECTION, SOLUTION INFILTRATION; PERINEURAL at 10:09

## 2023-10-03 ENCOUNTER — OFFICE VISIT (OUTPATIENT)
Dept: ORTHOPEDICS | Facility: CLINIC | Age: 68
End: 2023-10-03
Payer: MEDICARE

## 2023-10-03 VITALS — WEIGHT: 168 LBS | BODY MASS INDEX: 27.99 KG/M2 | HEIGHT: 65 IN

## 2023-10-03 DIAGNOSIS — M17.11 PRIMARY OSTEOARTHRITIS OF RIGHT KNEE: Primary | ICD-10-CM

## 2023-10-03 PROCEDURE — 20610 LARGE JOINT ASPIRATION/INJECTION: R KNEE: ICD-10-PCS | Mod: RT,S$GLB,, | Performed by: ORTHOPAEDIC SURGERY

## 2023-10-03 PROCEDURE — 99213 OFFICE O/P EST LOW 20 MIN: CPT | Mod: 25,S$GLB,, | Performed by: ORTHOPAEDIC SURGERY

## 2023-10-03 PROCEDURE — 20610 DRAIN/INJ JOINT/BURSA W/O US: CPT | Mod: RT,S$GLB,, | Performed by: ORTHOPAEDIC SURGERY

## 2023-10-03 PROCEDURE — 99213 PR OFFICE/OUTPT VISIT, EST, LEVL III, 20-29 MIN: ICD-10-PCS | Mod: 25,S$GLB,, | Performed by: ORTHOPAEDIC SURGERY

## 2023-10-03 RX ORDER — TRIAMCINOLONE ACETONIDE 40 MG/ML
40 INJECTION, SUSPENSION INTRA-ARTICULAR; INTRAMUSCULAR
Status: DISCONTINUED | OUTPATIENT
Start: 2023-10-03 | End: 2023-10-03 | Stop reason: HOSPADM

## 2023-10-03 RX ADMIN — TRIAMCINOLONE ACETONIDE 40 MG: 40 INJECTION, SUSPENSION INTRA-ARTICULAR; INTRAMUSCULAR at 01:10

## 2023-10-03 NOTE — PROGRESS NOTES
[facility]    Subjective:     Chief Complaint:   Chief Complaint   Patient presents with    Right Knee - Pain     Patient is here for CT results of her Right knee, states her pain is the same as her last visit.        Past Medical History:   Diagnosis Date    Anxiety     Arthritis     Back pain     Cancer     thyroid ca and skin ca    Depression     Femoral acetabular impingement 11/06/2017    GERD (gastroesophageal reflux disease)     Migraines     MVP (mitral valve prolapse)     RLS (restless legs syndrome)     Thyroid disease     thyroidectomy    Wears dentures     UPPER AND LOWER BRIDGE    Wears glasses        Past Surgical History:   Procedure Laterality Date    BACK SURGERY      nerve stimulator    COLONOSCOPY      COLONOSCOPY N/A 07/19/2022    Procedure: COLONOSCOPY;  Surgeon: Kuldeep Gill MD;  Location: [facility];  Service: Endoscopy;  Laterality: N/A;    HIP ARTHROPLASTY Right 06/25/2018    Procedure: ARTHROPLASTY, HIP;  Surgeon: Prosper Hodge MD;  Location: [facility] OR;  Service: Orthopedics;  Laterality: Right;  william    HIP ARTHROPLASTY Left 12/05/2022    Procedure: ARTHROPLASTY, HIP, LEFT;  Surgeon: Prosper Hodge MD;  Location: [facility] OR;  Service: Orthopedics;  Laterality: Left;    HYSTERECTOMY      implanted spinal cord stimulator      NONFUNCTIONING X 15 YEARS    JOINT REPLACEMENT  June 2017    Right Hip replacement    SACRAL NERVE STIMULATOR PLACEMENT Left     SKIN CANCER EXCISION      THYROIDECTOMY N/A 03/09/2020    Procedure: TOTAL THYROIDECTOMY;  Surgeon: Kelsie Rodgers MD;  Location: [facility] OR;  Service: General;  Laterality: N/A;    TONSILLECTOMY         Current Outpatient Medications   Medication Sig    acyclovir (ZOVIRAX) 400 MG tablet Take 1 tablet (400 mg total) by mouth 2 (two) times daily.    ascorbic acid, vitamin C, (VITAMIN C) 1000 MG tablet Take 1,000 mg by mouth once daily.    atorvastatin (LIPITOR) 40 MG tablet Take 1 tablet (40 mg total) by mouth once daily.    BERGAMOT  EXTRACT ORAL Take by mouth.    ergocalciferol (ERGOCALCIFEROL) 50,000 unit Cap Take 10,000 Units by mouth once daily.    esomeprazole magnesium (NEXIUM ORAL)     ibuprofen (ADVIL,MOTRIN) 200 MG tablet Take 1,000 mg by mouth every 6 (six) hours as needed for Pain.    phytonadione, vit K1, (VITAMIN K1 MISC) by Misc.(Non-Drug; Combo Route) route.    rOPINIRole (REQUIP) 1 MG tablet Take 1 tablet (1 mg total) by mouth every evening.    semaglutide (OZEMPIC) 0.25 mg or 0.5 mg(2 mg/1.5 mL) pen injector Inject 0.25 mg into the skin every 7 days.    sertraline (ZOLOFT) 100 MG tablet Take 1 tablet (100 mg total) by mouth once daily.    SYNTHROID 125 mcg tablet Take 112 mcg by mouth once daily.     No current facility-administered medications for this visit.     Facility-Administered Medications Ordered in Other Visits   Medication    electrolyte-S (ISOLYTE)    electrolyte-S (ISOLYTE)    fentaNYL 50 mcg/mL injection 25 mcg    LIDOcaine (PF) 10 mg/ml (1%) injection 10 mg    triamcinolone acetonide injection 40 mg       Review of patient's allergies indicates:   Allergen Reactions    Morphine Swelling     NECK SWELLED    Demerol (pf) [meperidine (pf)] Other (See Comments)     HEART RACES, BOUNCES OFF WALL    Erythromycin Itching    Opioids - morphine analogues     Oxycodone-acetaminophen Itching       Family History   Problem Relation Age of Onset    Diabetes Father         late onset    Glaucoma Father     Diabetes Other         nephew    Colon cancer Neg Hx     Breast cancer Neg Hx     Colon polyps Neg Hx        Social History     Socioeconomic History    Marital status:     Number of children: 1   Tobacco Use    Smoking status: Former     Current packs/day: 0.00     Average packs/day: 1 pack/day for 13.0 years (13.0 ttl pk-yrs)     Types: Cigarettes     Start date: 1996     Quit date: 2009     Years since quittin.7    Smokeless tobacco: Never    Tobacco comments:     I was in high school when I started  smoking - have no idea month and day, year was 1972   Substance and Sexual Activity    Alcohol use: Yes     Comment: I drink occasionally - not every week/day.    Drug use: No    Sexual activity: Yes     Partners: Male     Birth control/protection: Partner-Vasectomy, Post-menopausal       History of present illness:  Patient comes in today for her right knee.  Continues complain of significant right knee pain.  She had a CT arthrogram done of the knee.  She brings it in today      Review of Systems:    Constitution: Negative for chills, fever, and sweats.  Negative for unexplained weight loss.    HENT:  Negative for headaches and blurry vision.    Cardiovascular:Negative for chest pain or irregular heart beat. Negative for hypertension.    Respiratory:  Negative for cough and shortness of breath.    Gastrointestinal: Negative for abdominal pain, heartburn, melena, nausea, and vomitting.    Genitourinary:  Negative bladder incontinence and dysuria.    Musculoskeletal:  See HPI for details.     Neurological: Negative for numbness.    Psychiatric/Behavioral: Negative for depression.  The patient is not nervous/anxious.      Endocrine: Negative for polyuria    Hematologic/Lymphatic: Negative for bleeding problem.  Does not bruise/bleed easily.    Skin: Negative for poor would healing and rash    Objective:      Physical Examination:    Vital Signs:  There were no vitals filed for this visit.    Body mass index is 27.96 kg/m².    This a well-developed, well nourished patient in no acute distress.  They are alert and oriented and cooperative to examination.        Patient appears to be stated age.  She is good range of motion 0-120 degrees.  Her knee is stable to varus valgus anterior posterior stresses.  She is neurovascular intact to motor and sensory testing.  She is crepitus bilaterally.  Pertinent New Results:  CT arthrogram of the right knee demonstrates full-thickness cartilaginous loss in all 3 compartments.  XRAY  Report / Interpretation:       Assessment/Plan:      Severe arthritis right knee.  This is clearly demonstrated on the CT arthrogram.  We spoke extensively about joint replacement surgery.  I did inject the right knee today with Kenalog and lidocaine.  She will follow-up in 3 months.    This note was created using Dragon voice recognition software that occasionally misinterpreted phrases or words.

## 2023-10-03 NOTE — PROCEDURES
Large Joint Aspiration/Injection: R knee    Date/Time: 10/3/2023 1:00 PM    Performed by: Prosper Hodge MD  Authorized by: Prosper Hodge MD    Consent Done?:  Yes (Verbal)  Indications:  Arthritis and pain  Site marked: the procedure site was marked    Timeout: prior to procedure the correct patient, procedure, and site was verified    Prep: patient was prepped and draped in usual sterile fashion      Local anesthesia used?: Yes    Local anesthetic:  Lidocaine 1% without epinephrine    Details:  Needle Size:  25 G  Ultrasonic Guidance for needle placement?: No    Location:  Knee  Site:  R knee  Medications:  40 mg triamcinolone acetonide 40 mg/mL  Patient tolerance:  Patient tolerated the procedure well with no immediate complications

## 2023-11-14 ENCOUNTER — OFFICE VISIT (OUTPATIENT)
Dept: FAMILY MEDICINE | Facility: CLINIC | Age: 68
End: 2023-11-14
Payer: MEDICARE

## 2023-11-14 DIAGNOSIS — R22.31 ARM MASS, RIGHT: Primary | ICD-10-CM

## 2023-11-14 PROCEDURE — 99212 PR OFFICE/OUTPT VISIT, EST, LEVL II, 10-19 MIN: ICD-10-PCS | Mod: 95,,, | Performed by: STUDENT IN AN ORGANIZED HEALTH CARE EDUCATION/TRAINING PROGRAM

## 2023-11-14 PROCEDURE — 99212 OFFICE O/P EST SF 10 MIN: CPT | Mod: 95,,, | Performed by: STUDENT IN AN ORGANIZED HEALTH CARE EDUCATION/TRAINING PROGRAM

## 2023-11-14 NOTE — PROGRESS NOTES
The patient location is: [place]  The chief complaint leading to consultation is: Lump on arm    Visit type: audiovisual    Face to Face time with patient: 8 mins  10 minutes of total time spent on the encounter, which includes face to face time and non-face to face time preparing to see the patient (eg, review of tests), Obtaining and/or reviewing separately obtained history, Documenting clinical information in the electronic or other health record, Independently interpreting results (not separately reported) and communicating results to the patient/family/caregiver, or Care coordination (not separately reported).     Each patient to whom he or she provides medical services by telemedicine is:  (1) informed of the relationship between the physician and patient and the respective role of any other health care provider with respect to management of the patient; and (2) notified that he or she may decline to receive medical services by telemedicine and may withdraw from such care at any time.    Subjective:       Patient ID: Gloria Manuel is a 68 y.o. female.    Chief Complaint: arm mass      No trauma  Noticed it yesterday  Nonpainful.  Was hard yesterday and is softer today  Not as large as yesterday.  Has some brusing overlying the area.      Review of Systems   Constitutional:  Negative for activity change and unexpected weight change.   HENT:  Negative for hearing loss, rhinorrhea and trouble swallowing.    Eyes:  Negative for discharge and visual disturbance.   Respiratory:  Negative for chest tightness and wheezing.    Cardiovascular:  Negative for chest pain and palpitations.   Gastrointestinal:  Negative for blood in stool, constipation, diarrhea and vomiting.   Endocrine: Negative for polydipsia and polyuria.   Genitourinary:  Negative for difficulty urinating, dysuria, hematuria and menstrual problem.   Musculoskeletal:  Negative for arthralgias, joint swelling and neck pain.   Neurological:   Negative for weakness and headaches.   Psychiatric/Behavioral:  Negative for confusion and dysphoric mood.          Objective:      Physical Exam  Constitutional:       General: She is not in acute distress.     Appearance: Normal appearance. She is not ill-appearing.   Eyes:      Conjunctiva/sclera: Conjunctivae normal.   Pulmonary:      Effort: Pulmonary effort is normal. No respiratory distress.   Skin:     Findings: No rash.      Comments: Area of swelling with purple hue overlying the posterior forearm on right. Virtual visit limits exam   Neurological:      Mental Status: She is alert. Mental status is at baseline.   Psychiatric:         Mood and Affect: Mood normal.         Behavior: Behavior normal.         Thought Content: Thought content normal.         Judgment: Judgment normal.         Assessment:       1. Arm mass, right        Plan:       Problem List Items Addressed This Visit    None  Visit Diagnoses       Arm mass, right    -  Primary    favors hematoma.  she would like to monitor. offered imaging.  followup in a week and determine next steps

## 2023-11-29 ENCOUNTER — OFFICE VISIT (OUTPATIENT)
Dept: FAMILY MEDICINE | Facility: CLINIC | Age: 68
End: 2023-11-29
Payer: MEDICARE

## 2023-11-29 DIAGNOSIS — B37.31 YEAST VAGINITIS: Primary | ICD-10-CM

## 2023-11-29 PROCEDURE — 99213 OFFICE O/P EST LOW 20 MIN: CPT | Mod: 95,,, | Performed by: STUDENT IN AN ORGANIZED HEALTH CARE EDUCATION/TRAINING PROGRAM

## 2023-11-29 PROCEDURE — 99213 PR OFFICE/OUTPT VISIT, EST, LEVL III, 20-29 MIN: ICD-10-PCS | Mod: 95,,, | Performed by: STUDENT IN AN ORGANIZED HEALTH CARE EDUCATION/TRAINING PROGRAM

## 2023-11-29 RX ORDER — FLUCONAZOLE 150 MG/1
150 TABLET ORAL DAILY
Qty: 2 TABLET | Refills: 0 | Status: SHIPPED | OUTPATIENT
Start: 2023-11-29 | End: 2023-12-01

## 2023-11-29 NOTE — PROGRESS NOTES
The patient location is: [place]  The chief complaint leading to consultation is: Yeast infection    Visit type: audiovisual    Face to Face time with patient: 7 mins  8 minutes of total time spent on the encounter, which includes face to face time and non-face to face time preparing to see the patient (eg, review of tests), Obtaining and/or reviewing separately obtained history, Documenting clinical information in the electronic or other health record, Independently interpreting results (not separately reported) and communicating results to the patient/family/caregiver, or Care coordination (not separately reported).     Each patient to whom he or she provides medical services by telemedicine is:  (1) informed of the relationship between the physician and patient and the respective role of any other health care provider with respect to management of the patient; and (2) notified that he or she may decline to receive medical services by telemedicine and may withdraw from such care at any time.    Subjective:       Patient ID: Gloria Manuel is a 68 y.o. female.    Chief Complaint: Vaginitis      Vaginal itching- severe.  Mild discharge with some smell. Thick and white.  Using monistat without much improvement  She has never had a yeast infection.  Does not feel like uti  No recent antibiotics.  Mild dysuria from scratching    Dysuria   This is a new problem. The current episode started 1 to 4 weeks ago. The problem occurs every urination. The problem has been rapidly worsening. The quality of the pain is described as burning. The pain is at a severity of 9/10. The pain is moderate. There has been no fever. She is Not sexually active. There is A history of pyelonephritis. Associated symptoms include a discharge and urgency. Pertinent negatives include no behavior changes, chills, flank pain, frequency, hematuria, hesitancy, nausea, possible pregnancy, sweats, vomiting, weight loss, constipation, rash or  withholding. She has tried home medications for the symptoms. The treatment provided no relief. Her past medical history is significant for recurrent UTIs. There is no history of catheterization, diabetes insipidus, diabetes mellitus, genitourinary reflux, hypertension, kidney stones, a single kidney, STD, urinary stasis or a urological procedure.     Review of Systems   Constitutional:  Negative for chills and weight loss.   Gastrointestinal:  Negative for constipation, nausea and vomiting.   Genitourinary:  Positive for dysuria and urgency. Negative for flank pain, frequency, hematuria and hesitancy.        Vaginal itching   Integumentary:  Negative for rash.         Objective:      Physical Exam  Constitutional:       General: She is not in acute distress.     Appearance: Normal appearance. She is not ill-appearing.   Pulmonary:      Effort: Pulmonary effort is normal. No respiratory distress.   Neurological:      Mental Status: She is alert.   Psychiatric:         Mood and Affect: Mood normal.         Behavior: Behavior normal.         Thought Content: Thought content normal.         Judgment: Judgment normal.         Assessment:       1. Yeast vaginitis        Plan:       Problem List Items Addressed This Visit    None  Visit Diagnoses       Yeast vaginitis    -  Primary    Relevant Medications    fluconazole (DIFLUCAN) 150 MG Tab

## 2023-12-11 ENCOUNTER — OFFICE VISIT (OUTPATIENT)
Dept: FAMILY MEDICINE | Facility: CLINIC | Age: 68
End: 2023-12-11
Payer: MEDICARE

## 2023-12-11 DIAGNOSIS — N89.8 VAGINAL ITCHING: ICD-10-CM

## 2023-12-11 DIAGNOSIS — B37.31 YEAST VAGINITIS: Primary | ICD-10-CM

## 2023-12-11 PROCEDURE — 99213 OFFICE O/P EST LOW 20 MIN: CPT | Mod: 95,,, | Performed by: STUDENT IN AN ORGANIZED HEALTH CARE EDUCATION/TRAINING PROGRAM

## 2023-12-11 PROCEDURE — 99213 PR OFFICE/OUTPT VISIT, EST, LEVL III, 20-29 MIN: ICD-10-PCS | Mod: 95,,, | Performed by: STUDENT IN AN ORGANIZED HEALTH CARE EDUCATION/TRAINING PROGRAM

## 2023-12-11 NOTE — PROGRESS NOTES
The patient location is: [place]  The chief complaint leading to consultation is: Vaginal itching    Visit type: audiovisual    Face to Face time with patient: 3 mins  4 minutes of total time spent on the encounter, which includes face to face time and non-face to face time preparing to see the patient (eg, review of tests), Obtaining and/or reviewing separately obtained history, Documenting clinical information in the electronic or other health record, Independently interpreting results (not separately reported) and communicating results to the patient/family/caregiver, or Care coordination (not separately reported).     Each patient to whom he or she provides medical services by telemedicine is:  (1) informed of the relationship between the physician and patient and the respective role of any other health care provider with respect to management of the patient; and (2) notified that he or she may decline to receive medical services by telemedicine and may withdraw from such care at any time.    Subjective:       Patient ID: Gloria Manuel is a 68 y.o. female.    Chief Complaint: Vaginal Itching (/)      Continued vaginal itching.  Did not change with diflucan  She has been using monistat  No discharge.      Review of Systems   Constitutional:  Negative for activity change and unexpected weight change.   HENT:  Negative for hearing loss, rhinorrhea and trouble swallowing.    Eyes:  Negative for discharge and visual disturbance.   Respiratory:  Negative for chest tightness and wheezing.    Cardiovascular:  Negative for chest pain and palpitations.   Gastrointestinal:  Negative for blood in stool, constipation, diarrhea and vomiting.   Endocrine: Negative for polydipsia and polyuria.   Genitourinary:  Negative for difficulty urinating, dysuria, hematuria, menstrual problem and vaginal discharge.        Vaginal itching   Musculoskeletal:  Negative for arthralgias, joint swelling and neck pain.    Neurological:  Negative for weakness and headaches.   Psychiatric/Behavioral:  Negative for confusion and dysphoric mood.          Objective:      Physical Exam  Constitutional:       General: She is not in acute distress.     Appearance: Normal appearance. She is not ill-appearing.   Pulmonary:      Effort: Pulmonary effort is normal. No respiratory distress.   Neurological:      Mental Status: She is alert.   Psychiatric:         Mood and Affect: Mood normal.         Behavior: Behavior normal.         Thought Content: Thought content normal.         Judgment: Judgment normal.         Assessment:       1. Yeast vaginitis    2. Vaginal itching        Plan:       Problem List Items Addressed This Visit    None  Visit Diagnoses       Yeast vaginitis    -  Primary    no response to monistat or diflucan    Vaginal itching        needs in person exam. stop monistat    Relevant Orders    Ambulatory referral/consult to Obstetrics / Gynecology

## 2023-12-15 ENCOUNTER — PATIENT MESSAGE (OUTPATIENT)
Dept: FAMILY MEDICINE | Facility: CLINIC | Age: 68
End: 2023-12-15
Payer: MEDICARE

## 2023-12-29 ENCOUNTER — PATIENT MESSAGE (OUTPATIENT)
Dept: FAMILY MEDICINE | Facility: CLINIC | Age: 68
End: 2023-12-29
Payer: MEDICARE

## 2023-12-29 DIAGNOSIS — G25.81 RLS (RESTLESS LEGS SYNDROME): ICD-10-CM

## 2023-12-29 RX ORDER — ROPINIROLE 2 MG/1
2 TABLET, FILM COATED ORAL NIGHTLY
Qty: 90 TABLET | Refills: 0 | Status: SHIPPED | OUTPATIENT
Start: 2023-12-29 | End: 2024-03-15 | Stop reason: SDUPTHER

## 2024-01-09 ENCOUNTER — OFFICE VISIT (OUTPATIENT)
Dept: ORTHOPEDICS | Facility: CLINIC | Age: 69
End: 2024-01-09
Payer: MEDICARE

## 2024-01-09 VITALS — WEIGHT: 168 LBS | HEIGHT: 65 IN | BODY MASS INDEX: 27.99 KG/M2

## 2024-01-09 DIAGNOSIS — M17.11 PRIMARY OSTEOARTHRITIS OF RIGHT KNEE: Primary | ICD-10-CM

## 2024-01-09 PROCEDURE — 99213 OFFICE O/P EST LOW 20 MIN: CPT | Mod: 25,S$GLB,, | Performed by: ORTHOPAEDIC SURGERY

## 2024-01-09 PROCEDURE — 20610 DRAIN/INJ JOINT/BURSA W/O US: CPT | Mod: RT,S$GLB,, | Performed by: ORTHOPAEDIC SURGERY

## 2024-01-09 RX ORDER — TRIAMCINOLONE ACETONIDE 40 MG/ML
40 INJECTION, SUSPENSION INTRA-ARTICULAR; INTRAMUSCULAR
Status: DISCONTINUED | OUTPATIENT
Start: 2024-01-09 | End: 2024-01-09 | Stop reason: HOSPADM

## 2024-01-09 RX ADMIN — TRIAMCINOLONE ACETONIDE 40 MG: 40 INJECTION, SUSPENSION INTRA-ARTICULAR; INTRAMUSCULAR at 11:01

## 2024-01-09 NOTE — PROCEDURES
Large Joint Aspiration/Injection: R knee    Date/Time: 1/9/2024 11:00 AM    Performed by: Prosper Hodge MD  Authorized by: Prsoper Hodge MD    Consent Done?:  Yes (Verbal)  Indications:  Arthritis and pain  Site marked: the procedure site was marked    Timeout: prior to procedure the correct patient, procedure, and site was verified    Prep: patient was prepped and draped in usual sterile fashion      Local anesthesia used?: Yes    Local anesthetic:  Lidocaine 1% without epinephrine    Details:  Needle Size:  25 G  Ultrasonic Guidance for needle placement?: No    Location:  Knee  Site:  R knee  Medications:  40 mg triamcinolone acetonide 40 mg/mL  Patient tolerance:  Patient tolerated the procedure well with no immediate complications

## 2024-01-09 NOTE — PROGRESS NOTES
[facility]    Subjective:     Chief Complaint:   Chief Complaint   Patient presents with    Right Knee - Pain     R knee inj 10/3/23, got a month of relief, states she is not ready for sx        Past Medical History:   Diagnosis Date    Anxiety     Arthritis     Back pain     Cancer     thyroid ca and skin ca    Depression     Femoral acetabular impingement 11/06/2017    GERD (gastroesophageal reflux disease)     Migraines     MVP (mitral valve prolapse)     RLS (restless legs syndrome)     Thyroid disease     thyroidectomy    Wears dentures     UPPER AND LOWER BRIDGE    Wears glasses        Past Surgical History:   Procedure Laterality Date    BACK SURGERY      nerve stimulator    COLONOSCOPY      COLONOSCOPY N/A 07/19/2022    Procedure: COLONOSCOPY;  Surgeon: Kuldeep Gill MD;  Location: [facility] ENDO;  Service: Endoscopy;  Laterality: N/A;    HIP ARTHROPLASTY Right 06/25/2018    Procedure: ARTHROPLASTY, HIP;  Surgeon: Prosper Hodge MD;  Location: [facility] OR;  Service: Orthopedics;  Laterality: Right;  william    HIP ARTHROPLASTY Left 12/05/2022    Procedure: ARTHROPLASTY, HIP, LEFT;  Surgeon: Prosper Hodge MD;  Location: [facility] OR;  Service: Orthopedics;  Laterality: Left;    HYSTERECTOMY      implanted spinal cord stimulator      NONFUNCTIONING X 15 YEARS    JOINT REPLACEMENT  June 2017    Right Hip replacement    SACRAL NERVE STIMULATOR PLACEMENT Left     SKIN CANCER EXCISION      THYROIDECTOMY N/A 03/09/2020    Procedure: TOTAL THYROIDECTOMY;  Surgeon: Kelsie Rodgers MD;  Location: [facility] OR;  Service: General;  Laterality: N/A;    TONSILLECTOMY         Current Outpatient Medications   Medication Sig    acyclovir (ZOVIRAX) 400 MG tablet Take 1 tablet (400 mg total) by mouth 2 (two) times daily.    ascorbic acid, vitamin C, (VITAMIN C) 1000 MG tablet Take 1,000 mg by mouth once daily.    atorvastatin (LIPITOR) 40 MG tablet Take 1 tablet (40 mg total) by mouth once daily.    ergocalciferol (ERGOCALCIFEROL)  50,000 unit Cap Take 10,000 Units by mouth once daily.    esomeprazole magnesium (NEXIUM ORAL)     ibuprofen (ADVIL,MOTRIN) 200 MG tablet Take 1,000 mg by mouth every 6 (six) hours as needed for Pain.    phytonadione, vit K1, (VITAMIN K1 MISC) by Misc.(Non-Drug; Combo Route) route.    rOPINIRole (REQUIP) 2 MG tablet Take 1 tablet (2 mg total) by mouth every evening.    semaglutide (OZEMPIC) 0.25 mg or 0.5 mg(2 mg/1.5 mL) pen injector Inject 0.25 mg into the skin every 7 days.    sertraline (ZOLOFT) 100 MG tablet Take 1 tablet (100 mg total) by mouth once daily.    SYNTHROID 125 mcg tablet Take 112 mcg by mouth once daily.    BERGAMOT EXTRACT ORAL Take by mouth.     No current facility-administered medications for this visit.     Facility-Administered Medications Ordered in Other Visits   Medication    electrolyte-S (ISOLYTE)    electrolyte-S (ISOLYTE)    fentaNYL 50 mcg/mL injection 25 mcg    LIDOcaine (PF) 10 mg/ml (1%) injection 10 mg    triamcinolone acetonide injection 40 mg       Review of patient's allergies indicates:   Allergen Reactions    Morphine Swelling     NECK SWELLED    Demerol (pf) [meperidine (pf)] Other (See Comments)     HEART RACES, BOUNCES OFF WALL    Erythromycin Itching    Opioids - morphine analogues     Oxycodone-acetaminophen Itching       Family History   Problem Relation Age of Onset    Diabetes Father         late onset    Glaucoma Father     Diabetes Other         nephew    Colon cancer Neg Hx     Breast cancer Neg Hx     Colon polyps Neg Hx        Social History     Socioeconomic History    Marital status:     Number of children: 1   Tobacco Use    Smoking status: Former     Current packs/day: 0.00     Average packs/day: 1 pack/day for 13.0 years (13.0 ttl pk-yrs)     Types: Cigarettes     Start date: 1996     Quit date: 2009     Years since quittin.9    Smokeless tobacco: Never    Tobacco comments:     I was in high school when I started smoking - have no idea  month and day, year was 1972   Substance and Sexual Activity    Alcohol use: Yes     Comment: I drink occasionally - not every week/day.    Drug use: No    Sexual activity: Yes     Partners: Male     Birth control/protection: Partner-Vasectomy, Post-menopausal     Social Determinants of Health     Financial Resource Strain: Low Risk  (11/14/2023)    Overall Financial Resource Strain (CARDIA)     Difficulty of Paying Living Expenses: Not very hard   Food Insecurity: No Food Insecurity (11/14/2023)    Hunger Vital Sign     Worried About Running Out of Food in the Last Year: Never true     Ran Out of Food in the Last Year: Never true   Transportation Needs: No Transportation Needs (11/14/2023)    PRAPARE - Transportation     Lack of Transportation (Medical): No     Lack of Transportation (Non-Medical): No   Physical Activity: Unknown (11/14/2023)    Exercise Vital Sign     Days of Exercise per Week: 0 days   Stress: Stress Concern Present (11/14/2023)    [employer] [place] [employer] - Occupational Stress Questionnaire     Feeling of Stress : To some extent   Social Connections: Unknown (11/14/2023)    Social Connection and Isolation Panel [NHANES]     Frequency of Communication with Friends and Family: More than three times a week     Frequency of Social Gatherings with Friends and Family: Once a week     Active Member of Clubs or Organizations: Yes     Attends Club or Organization Meetings: More than 4 times per year     Marital Status:    Housing Stability: Low Risk  (11/14/2023)    Housing Stability Vital Sign     Unable to Pay for Housing in the Last Year: No     Number of Places Lived in the Last Year: 1     Unstable Housing in the Last Year: No       History of present illness:  Ms. Castro comes today for follow-up the right knee.  She has known arthrosis in the right knee.  She was last seen and injected in his knee 3 months ago with good relief of her symptoms, yet short lived - perhaps 1  month.  Denies any new injury or trauma.  Comes in today for follow-up.  States she is not ready to proceed with surgical intervention yet.  Of note, she has lost about 40 lb recently using Ozempic.    Review of Systems:    Constitution: Negative for chills, fever, and sweats.  Negative for unexplained weight loss.    HENT:  Negative for headaches and blurry vision.    Cardiovascular:Negative for chest pain or irregular heart beat. Negative for hypertension.    Respiratory:  Negative for cough and shortness of breath.    Gastrointestinal: Negative for abdominal pain, heartburn, melena, nausea, and vomitting.    Genitourinary:  Negative bladder incontinence and dysuria.    Musculoskeletal:  See HPI for details.     Neurological: Negative for numbness.    Psychiatric/Behavioral: Negative for depression.  The patient is not nervous/anxious.      Endocrine: Negative for polyuria    Hematologic/Lymphatic: Negative for bleeding problem.  Does not bruise/bleed easily.    Skin: Negative for poor would healing and rash    Objective:      Physical Examination:    Vital Signs:  There were no vitals filed for this visit.    Body mass index is 27.96 kg/m².    This a well-developed, well nourished patient in no acute distress.  They are alert and oriented and cooperative to examination.        Right knee exam: Skin to the right knee is clean dry and intact.  No erythema or ecchymosis.  No signs or symptoms of infection.  She is neurovascularly intact throughout the right lower extremity.  Right calf is soft and nontender.  Range of motion is well-preserved at 0-120 degrees.  Does have crepitus with range of motioning of the right lower extremity.  She can weightbear as tolerated on the right lower extremity.    Pertinent New Results:    XRAY Report / Interpretation:   No new radiographs taken on today's clinic visit.    Assessment/Plan:      1. Right knee osteoarthritis, severe.      At her request, I injected the right knee  "today via an anterolateral approach with 40 mg of Kenalog and lidocaine.  She tolerated this well.  She can follow up with us in 3 months or on an as-needed basis for repeat injections where she determine she is ready to proceed with surgical intervention with total knee arthroplasty.  She plans on retiring later this year I would like to proceed with surgical intervention after that.  I believe that is an appropriate and acceptable course of action.    Deni Hill, Physician Assistant, served in the capacity as a "scribe" for this patient encounter.  A "face-to-face" encounter occurred with Dr. Prosper Hodge on this date.  The treatment plan and medical decision-making is outlined above. Patient was seen and examined with a chaperone.       This note was created using Dragon voice recognition software that occasionally misinterpreted phrases or words.          "

## 2024-03-15 DIAGNOSIS — B00.1 FEVER BLISTER: ICD-10-CM

## 2024-03-15 DIAGNOSIS — G25.81 RLS (RESTLESS LEGS SYNDROME): ICD-10-CM

## 2024-03-16 NOTE — TELEPHONE ENCOUNTER
No care due was identified.  [facility] Embedded Care Due Messages. Reference number: [id number].   3/15/2024 7:15:42 PM CDT

## 2024-03-16 NOTE — TELEPHONE ENCOUNTER
No care due was identified.  [facility] Embedded Care Due Messages. Reference number: [id number].   3/15/2024 7:11:27 PM CDT

## 2024-03-18 RX ORDER — ROPINIROLE 2 MG/1
2 TABLET, FILM COATED ORAL NIGHTLY
Qty: 90 TABLET | Refills: 0 | Status: SHIPPED | OUTPATIENT
Start: 2024-03-18 | End: 2024-06-12

## 2024-03-18 RX ORDER — ACYCLOVIR 400 MG/1
400 TABLET ORAL 2 TIMES DAILY
Qty: 14 TABLET | Refills: 5 | Status: SHIPPED | OUTPATIENT
Start: 2024-03-18

## 2024-03-18 NOTE — TELEPHONE ENCOUNTER
Refill Routing Note   Medication(s) are not appropriate for processing by [facility] for the following reason(s):        Outside of protocol  Responsible provider unclear    [facility] action(s):  Route             Appointments  past 12m or future 3m with PCP    Date Provider   Last Visit   12/11/2023 Liliana Rangel MD   Next Visit   3/15/2024 Liliana Rangel MD   ED visits in past 90 days: 0        Note composed:8:23 AM 03/18/2024

## 2024-03-18 NOTE — TELEPHONE ENCOUNTER
Refill Routing Note   Medication(s) are not appropriate for processing by [facility] for the following reason(s):        New or recently adjusted medication    [facility] action(s):  Defer             Appointments  past 12m or future 3m with PCP    Date Provider   Last Visit   12/11/2023 Liliana Rangel MD   Next Visit   Visit date not found Liliana Rangel MD   ED visits in past 90 days: 0        Note composed:8:38 AM 03/18/2024

## 2024-04-12 ENCOUNTER — OFFICE VISIT (OUTPATIENT)
Dept: ORTHOPEDICS | Facility: CLINIC | Age: 69
End: 2024-04-12
Payer: MEDICARE

## 2024-04-12 VITALS — HEIGHT: 65 IN | BODY MASS INDEX: 26.33 KG/M2 | WEIGHT: 158 LBS

## 2024-04-12 DIAGNOSIS — M17.11 PRIMARY OSTEOARTHRITIS OF RIGHT KNEE: Primary | ICD-10-CM

## 2024-04-12 PROCEDURE — 99213 OFFICE O/P EST LOW 20 MIN: CPT | Mod: 25,S$GLB,, | Performed by: PHYSICIAN ASSISTANT

## 2024-04-12 PROCEDURE — 20610 DRAIN/INJ JOINT/BURSA W/O US: CPT | Mod: RT,S$GLB,, | Performed by: PHYSICIAN ASSISTANT

## 2024-04-12 RX ORDER — TRIAMCINOLONE ACETONIDE 40 MG/ML
40 INJECTION, SUSPENSION INTRA-ARTICULAR; INTRAMUSCULAR
Status: DISCONTINUED | OUTPATIENT
Start: 2024-04-12 | End: 2024-04-12 | Stop reason: HOSPADM

## 2024-04-12 RX ADMIN — TRIAMCINOLONE ACETONIDE 40 MG: 40 INJECTION, SUSPENSION INTRA-ARTICULAR; INTRAMUSCULAR at 09:04

## 2024-04-12 NOTE — PROCEDURES
Large Joint Aspiration/Injection: R knee    Date/Time: 4/12/2024 9:45 AM    Performed by: Deni Hill PA-C  Authorized by: Deni Hill PA-C    Consent Done?:  Yes (Verbal)  Indications:  Arthritis and pain  Site marked: the procedure site was marked    Timeout: prior to procedure the correct patient, procedure, and site was verified    Prep: patient was prepped and draped in usual sterile fashion      Local anesthesia used?: Yes    Local anesthetic:  Lidocaine 1% without epinephrine    Details:  Needle Size:  25 G  Ultrasonic Guidance for needle placement?: No    Location:  Knee  Site:  R knee  Medications:  40 mg triamcinolone acetonide 40 mg/mL  Patient tolerance:  Patient tolerated the procedure well with no immediate complications

## 2024-04-12 NOTE — PROGRESS NOTES
[facility]  [address]  [place] [address]  Phone: [phone]   [phone]    Patient's PCP: Liliana Rangel MD  Referring Provider: No ref. provider found    Subjective:      Chief Complaint:   Chief Complaint   Patient presents with    Right Knee - Pain     Right knee pain, last injected in January, the injections are helping, lasting about 3 months       Past Medical History:   Diagnosis Date    Anxiety     Arthritis     Back pain     Cancer     thyroid ca and skin ca    Depression     Femoral acetabular impingement 11/06/2017    GERD (gastroesophageal reflux disease)     Migraines     MVP (mitral valve prolapse)     RLS (restless legs syndrome)     Thyroid disease     thyroidectomy    Wears dentures     UPPER AND LOWER BRIDGE    Wears glasses        Past Surgical History:   Procedure Laterality Date    BACK SURGERY      nerve stimulator    COLONOSCOPY      COLONOSCOPY N/A 07/19/2022    Procedure: COLONOSCOPY;  Surgeon: Kuldeep Gill MD;  Location: [facility] ENDO;  Service: Endoscopy;  Laterality: N/A;    HIP ARTHROPLASTY Right 06/25/2018    Procedure: ARTHROPLASTY, HIP;  Surgeon: Prosper Hodge MD;  Location: [facility] OR;  Service: Orthopedics;  Laterality: Right;  william    HIP ARTHROPLASTY Left 12/05/2022    Procedure: ARTHROPLASTY, HIP, LEFT;  Surgeon: Prosper Hodge MD;  Location: [facility] OR;  Service: Orthopedics;  Laterality: Left;    HYSTERECTOMY      implanted spinal cord stimulator      NONFUNCTIONING X 15 YEARS    JOINT REPLACEMENT  June 2017    Right Hip replacement    SACRAL NERVE STIMULATOR PLACEMENT Left     SKIN CANCER EXCISION      THYROIDECTOMY N/A 03/09/2020    Procedure: TOTAL THYROIDECTOMY;  Surgeon: Kelsie Rodgers MD;  Location: [facility] OR;  Service: General;  Laterality: N/A;    TONSILLECTOMY         Current Outpatient Medications   Medication Sig    acyclovir (ZOVIRAX) 400 MG tablet Take 1 tablet (400 mg total) by mouth 2 (two) times daily.    ascorbic acid,  vitamin C, (VITAMIN C) 1000 MG tablet Take 1,000 mg by mouth once daily.    atorvastatin (LIPITOR) 40 MG tablet Take 1 tablet (40 mg total) by mouth once daily.    ergocalciferol (ERGOCALCIFEROL) 50,000 unit Cap Take 10,000 Units by mouth once daily.    esomeprazole magnesium (NEXIUM ORAL)     estradioL (ESTRACE) 0.01 % (0.1 mg/gram) vaginal cream 1g per vagina nightly x 2 weeks, then 2-3 times per week    fluconazole (DIFLUCAN) 150 MG Tab Take 1 tablet today, then repeat in 3 days.    ibuprofen (ADVIL,MOTRIN) 200 MG tablet Take 1,000 mg by mouth every 6 (six) hours as needed for Pain.    nystatin (MYCOSTATIN) ointment Apply topically 2 (two) times daily. for 7 days    phytonadione, vit K1, (VITAMIN K1 MISC) by Misc.(Non-Drug; Combo Route) route.    rOPINIRole (REQUIP) 2 MG tablet Take 1 tablet (2 mg total) by mouth every evening.    sertraline (ZOLOFT) 100 MG tablet Take 1 tablet (100 mg total) by mouth once daily.    SYNTHROID 125 mcg tablet Take 112 mcg by mouth once daily.     No current facility-administered medications for this visit.     Facility-Administered Medications Ordered in Other Visits   Medication    electrolyte-S (ISOLYTE)    electrolyte-S (ISOLYTE)    fentaNYL 50 mcg/mL injection 25 mcg    LIDOcaine (PF) 10 mg/ml (1%) injection 10 mg    triamcinolone acetonide injection 40 mg       Review of patient's allergies indicates:   Allergen Reactions    Morphine Swelling     NECK SWELLED    Demerol (pf) [meperidine (pf)] Other (See Comments)     HEART RACES, BOUNCES OFF WALL    Erythromycin Itching    Opioids - morphine analogues     Oxycodone-acetaminophen Itching       Family History   Problem Relation Age of Onset    Diabetes Father         late onset    Glaucoma Father     Diabetes Other         nephew    Colon cancer Neg Hx     Breast cancer Neg Hx     Colon polyps Neg Hx        Social History     Socioeconomic History    Marital status:     Number of children: 1   Tobacco Use    Smoking  status: Former     Current packs/day: 0.00     Average packs/day: 1 pack/day for 13.0 years (13.0 ttl pk-yrs)     Types: Cigarettes     Start date: 1/14/1996     Quit date: 1/14/2009     Years since quitting: 15.2    Smokeless tobacco: Never    Tobacco comments:     I was in high school when I started smoking - have no idea month and day, year was 1972   Substance and Sexual Activity    Alcohol use: Yes     Comment: I drink occasionally - not every week/day.    Drug use: No    Sexual activity: Yes     Partners: Male     Birth control/protection: Post-menopausal, See Surgical Hx     Social Determinants of Health     Financial Resource Strain: Low Risk  (11/14/2023)    Overall Financial Resource Strain (CARDIA)     Difficulty of Paying Living Expenses: Not very hard   Food Insecurity: No Food Insecurity (11/14/2023)    Hunger Vital Sign     Worried About Running Out of Food in the Last Year: Never true     Ran Out of Food in the Last Year: Never true   Transportation Needs: No Transportation Needs (11/14/2023)    PRAPARE - Transportation     Lack of Transportation (Medical): No     Lack of Transportation (Non-Medical): No   Physical Activity: Unknown (11/14/2023)    Exercise Vital Sign     Days of Exercise per Week: 0 days   Stress: Stress Concern Present (11/14/2023)    [employer] [employer] - Occupational Stress Questionnaire     Feeling of Stress : To some extent   Social Connections: Unknown (11/14/2023)    Social Connection and Isolation Panel [NHANES]     Frequency of Communication with Friends and Family: More than three times a week     Frequency of Social Gatherings with Friends and Family: Once a week     Active Member of Clubs or Organizations: Yes     Attends Club or Organization Meetings: More than 4 times per year     Marital Status:    Housing Stability: Low Risk  (11/14/2023)    Housing Stability Vital Sign     Unable to Pay for Housing in the Last Year: No     Number of  Places Lived in the Last Year: 1     Unstable Housing in the Last Year: No       History of present illness:  Ms. Castro comes in today for follow-up for her right knee.  She has known osteoarthritis in the knee.  She was last seen in an injected 3 months ago with good relief of her symptoms until about a week ago.  Her pain has returned.  She denies any new injury or trauma.  Fortunately, this injection was more efficacious in the previous 1 which only lasted 1 month.  She is interested in repeating an injection today.  I believe this is amenable.    Review of Systems:    Constitutional: Negative for chills, fever and weight loss.   HENT: Negative for congestion.    Eyes: Negative for discharge and redness.   Respiratory: Negative for cough and shortness of breath.    Cardiovascular: Negative for chest pain.   Gastrointestinal: Negative for nausea and vomiting.   Musculoskeletal: See HPI.   Skin: Negative for rash.   Neurological: Negative for headaches.   Endo/Heme/Allergies: Does not bruise/bleed easily.   Psychiatric/Behavioral: The patient is not nervous/anxious.    All other systems reviewed and are negative.       Objective:      Physical Examination:    Vital Signs:  There were no vitals filed for this visit.    Body mass index is 26.29 kg/m².    This a well-developed, well nourished patient in no acute distress.  They are alert and oriented and cooperative to examination.     Right knee exam: Her right knee exam is similar to where she has been on previous visits with us. Skin to the right knee is clean dry and intact. No erythema or ecchymosis. No signs or symptoms of infection. She is neurovascularly intact throughout the right lower extremity. Right calf is soft and nontender. Range of motion is well-preserved at 0-120 degrees. Does have crepitus with range of motioning of the right lower extremity. She can weightbear as tolerated on the right lower extremity.     Pertinent New Results:        XRAY Report  / Interpretation:   No new radiographs taken on today's clinic visit.      Assessment:       1. Primary osteoarthritis of right knee      Plan:     Primary osteoarthritis of right knee  -     Large Joint Aspiration/Injection: R knee        Follow up in about 3 months (around 7/12/2024) for Injec. f/up.    I injected her right knee today via an anterolateral approach with 40 mg of Kenalog and lidocaine.  She tolerated this well.  We will scheduled for follow-up in 3 months for repeat injection if she has having a recurrence of symptoms.  If she is doing quite well, she can simply cancel follow-up on an as-needed basis.  We did briefly discuss total knee arthroplasty in the outpatient nature of the procedure today.  She is aware that that is the definitive treatment recommendation for the osteoarthritis in her knee.  She will give it some consideration.  She may want to do this later in the year, sometime around November she stated.        Deni Hill, MPAS, PA-C    This note was created using [employer] voice recognition software that occasionally misinterprets words or phrases.

## 2024-05-06 ENCOUNTER — OFFICE VISIT (OUTPATIENT)
Dept: ORTHOPEDICS | Facility: CLINIC | Age: 69
End: 2024-05-06
Payer: MEDICARE

## 2024-05-06 VITALS
WEIGHT: 158.06 LBS | HEIGHT: 65 IN | SYSTOLIC BLOOD PRESSURE: 112 MMHG | RESPIRATION RATE: 18 BRPM | DIASTOLIC BLOOD PRESSURE: 68 MMHG | BODY MASS INDEX: 26.33 KG/M2

## 2024-05-06 DIAGNOSIS — M75.41 IMPINGEMENT SYNDROME OF RIGHT SHOULDER: Primary | ICD-10-CM

## 2024-05-06 DIAGNOSIS — M19.011 ARTHRITIS OF RIGHT ACROMIOCLAVICULAR JOINT: ICD-10-CM

## 2024-05-06 DIAGNOSIS — M19.011 GLENOHUMERAL ARTHRITIS, RIGHT: ICD-10-CM

## 2024-05-06 PROCEDURE — 99213 OFFICE O/P EST LOW 20 MIN: CPT | Mod: 25,S$GLB,, | Performed by: PHYSICIAN ASSISTANT

## 2024-05-06 PROCEDURE — 20610 DRAIN/INJ JOINT/BURSA W/O US: CPT | Mod: RT,S$GLB,, | Performed by: PHYSICIAN ASSISTANT

## 2024-05-06 RX ORDER — CICLOPIROX 7.7 MG/G
GEL TOPICAL
COMMUNITY
Start: 2024-04-26

## 2024-05-06 RX ORDER — LEVOTHYROXINE SODIUM 112 UG/1
TABLET ORAL
COMMUNITY
Start: 2024-04-19

## 2024-05-06 RX ORDER — TERBINAFINE HYDROCHLORIDE 250 MG/1
250 TABLET ORAL
COMMUNITY
Start: 2024-04-26

## 2024-05-06 RX ORDER — TRIAMCINOLONE ACETONIDE 40 MG/ML
40 INJECTION, SUSPENSION INTRA-ARTICULAR; INTRAMUSCULAR
Status: DISCONTINUED | OUTPATIENT
Start: 2024-05-06 | End: 2024-05-06 | Stop reason: HOSPADM

## 2024-05-06 RX ADMIN — TRIAMCINOLONE ACETONIDE 40 MG: 40 INJECTION, SUSPENSION INTRA-ARTICULAR; INTRAMUSCULAR at 12:05

## 2024-05-06 NOTE — PROCEDURES
Large Joint Aspiration/Injection: R subacromial bursa    Date/Time: 5/6/2024 12:30 PM    Performed by: Jaden Szymanski PA  Authorized by: Jaden Szymanski PA    Consent Done?:  Yes (Verbal)  Indications:  Pain  Site marked: the procedure site was marked    Timeout: prior to procedure the correct patient, procedure, and site was verified    Prep: patient was prepped and draped in usual sterile fashion      Local anesthesia used?: Yes    Local anesthetic:  Lidocaine 1% without epinephrine    Details:  Needle Size:  25 G  Ultrasonic Guidance for needle placement?: No    Location:  Shoulder  Site:  R subacromial bursa  Medications:  40 mg triamcinolone acetonide 40 mg/mL  Patient tolerance:  Patient tolerated the procedure well with no immediate complications     Pt over due for annual; labs entered

## 2024-05-06 NOTE — PROGRESS NOTES
[facility] ORTHOPED[facility]  [address]  [place] [address]  Phone: [phone]   [phone]    Patient's PCP: Liliana Rangel MD  Referring Provider: No ref. provider found    Subjective:      Chief Complaint:   Chief Complaint   Patient presents with    Right Shoulder - Pain     New complaint of sharp right shoulder pain x one day no know injury. Pain and limited ROM 8/10       Past Medical History:   Diagnosis Date    Anxiety     Arthritis     Back pain     Cancer     thyroid ca and skin ca    Depression     Femoral acetabular impingement 2017    GERD (gastroesophageal reflux disease)     Migraines     MVP (mitral valve prolapse)     RLS (restless legs syndrome)     Thyroid disease     thyroidectomy    Wears dentures     UPPER AND LOWER BRIDGE    Wears glasses        Past Surgical History:   Procedure Laterality Date    BACK SURGERY      nerve stimulator    COLONOSCOPY      COLONOSCOPY N/A 2022    Procedure: COLONOSCOPY;  Surgeon: Kuldeep Gill MD;  Location: [facility] ENDO;  Service: Endoscopy;  Laterality: N/A;    HIP ARTHROPLASTY Right 2018    Procedure: ARTHROPLASTY, HIP;  Surgeon: Prosper Hodge MD;  Location: [facility] OR;  Service: Orthopedics;  Laterality: Right;  william    HIP ARTHROPLASTY Left 2022    Procedure: ARTHROPLASTY, HIP, LEFT;  Surgeon: Prosper Hodge MD;  Location: [facility] OR;  Service: Orthopedics;  Laterality: Left;    HYSTERECTOMY      implanted spinal cord stimulator      NONFUNCTIONING X 15 YEARS    JOINT REPLACEMENT  2017    Right Hip replacement    SACRAL NERVE STIMULATOR PLACEMENT Left     SKIN CANCER EXCISION      THYROIDECTOMY N/A 2020    Procedure: TOTAL THYROIDECTOMY;  Surgeon: Kelsie Rodgers MD;  Location: [facility] OR;  Service: General;  Laterality: N/A;    TONSILLECTOMY         Current Outpatient Medications   Medication Sig Dispense Refill    ciclopirox 0.77 % Gel SMARTSIG:Topical Morning-Evening      SYNTHROID 112 mcg tablet SMARTSI  Tablet(s) By Mouth Daily on Weekdays      terbinafine HCL (LAMISIL) 250 mg tablet Take 250 mg by mouth.      acyclovir (ZOVIRAX) 400 MG tablet Take 1 tablet (400 mg total) by mouth 2 (two) times daily. 14 tablet 5    ascorbic acid, vitamin C, (VITAMIN C) 1000 MG tablet Take 1,000 mg by mouth once daily.      atorvastatin (LIPITOR) 40 MG tablet Take 1 tablet (40 mg total) by mouth once daily. 90 tablet 3    ergocalciferol (ERGOCALCIFEROL) 50,000 unit Cap Take 10,000 Units by mouth once daily.      esomeprazole magnesium (NEXIUM ORAL)       estradioL (ESTRACE) 0.01 % (0.1 mg/gram) vaginal cream 1g per vagina nightly x 2 weeks, then 2-3 times per week 42.5 g 3    fluconazole (DIFLUCAN) 150 MG Tab Take 1 tablet today, then repeat in 3 days. 2 tablet 0    ibuprofen (ADVIL,MOTRIN) 200 MG tablet Take 1,000 mg by mouth every 6 (six) hours as needed for Pain.      nystatin (MYCOSTATIN) ointment Apply topically 2 (two) times daily. for 7 days 30 g 0    phytonadione, vit K1, (VITAMIN K1 MISC) by Misc.(Non-Drug; Combo Route) route.      rOPINIRole (REQUIP) 2 MG tablet Take 1 tablet (2 mg total) by mouth every evening. 90 tablet 0    sertraline (ZOLOFT) 100 MG tablet Take 1 tablet (100 mg total) by mouth once daily. 90 tablet 3    SYNTHROID 125 mcg tablet Take 112 mcg by mouth once daily.       No current facility-administered medications for this visit.     Facility-Administered Medications Ordered in Other Visits   Medication Dose Route Frequency Provider Last Rate Last Admin    electrolyte-S (ISOLYTE)   Intravenous Continuous Lukasz Hannon MD        electrolyte-S (ISOLYTE)   Intravenous Continuous Lukasz Hannon MD   Stopped at 12/05/22 1240    fentaNYL 50 mcg/mL injection 25 mcg  25 mcg Intravenous Q5 Min PRN Lukasz Hannon MD   25 mcg at 12/05/22 1335    LIDOcaine (PF) 10 mg/ml (1%) injection 10 mg  1 mL Intradermal Once Lukasz Hannon MD        triamcinolone acetonide injection 40 mg  40 mg Intra-articular   Prosper Hodge MD   40 mg at 05/03/22 1300       Review of patient's allergies indicates:   Allergen Reactions    Morphine Swelling     NECK SWELLED    Demerol (pf) [meperidine (pf)] Other (See Comments)     HEART RACES, BOUNCES OFF WALL    Erythromycin Itching    Opioids - morphine analogues     Oxycodone-acetaminophen Itching       Family History   Problem Relation Name Age of Onset    Diabetes Father Juan Jose         late onset    Glaucoma Father Juan Jose     Diabetes Other          nephew    Colon cancer Neg Hx      Breast cancer Neg Hx      Colon polyps Neg Hx         Social History     Socioeconomic History    Marital status:     Number of children: 1   Tobacco Use    Smoking status: Former     Current packs/day: 0.00     Average packs/day: 1 pack/day for 13.0 years (13.0 ttl pk-yrs)     Types: Cigarettes     Start date: 1/14/1996     Quit date: 1/14/2009     Years since quitting: 15.3    Smokeless tobacco: Never    Tobacco comments:     I was in high school when I started smoking - have no idea month and day, year was 1972   Substance and Sexual Activity    Alcohol use: Yes     Comment: I drink occasionally - not every week/day.    Drug use: No    Sexual activity: Yes     Partners: Male     Birth control/protection: Post-menopausal, See Surgical Hx     Social Determinants of Health     Financial Resource Strain: Low Risk  (11/14/2023)    Overall Financial Resource Strain (CARDIA)     Difficulty of Paying Living Expenses: Not very hard   Food Insecurity: No Food Insecurity (11/14/2023)    Hunger Vital Sign     Worried About Running Out of Food in the Last Year: Never true     Ran Out of Food in the Last Year: Never true   Transportation Needs: No Transportation Needs (11/14/2023)    PRAPARE - Transportation     Lack of Transportation (Medical): No     Lack of Transportation (Non-Medical): No   Physical Activity: Unknown (11/14/2023)    Exercise Vital Sign     Days of Exercise per Week: 0 days   Stress:  Stress Concern Present (11/14/2023)    [employer] [place] [employer] - Occupational Stress Questionnaire     Feeling of Stress : To some extent   Housing Stability: Low Risk  (11/14/2023)    Housing Stability Vital Sign     Unable to Pay for Housing in the Last Year: No     Number of Places Lived in the Last Year: 1     Unstable Housing in the Last Year: No       Prior to meeting with the patient I reviewed the medical chart in [facility]. This included reviewing the previous progress notes from our office, review of the patient's last appointment with their primary care provider, review of any visits to the emergency room, and review of any pain management appointments or procedures.    History of present illness:  68-year-old female, returns to clinic today for evaluation of complaints of right shoulder pain.  She has had episodic right shoulder pain for a few years now.  We have injected her, subacromial space now several times.  Acute onset right shoulder pain yesterday morning.  No trauma.  Painful range of motion with internal rotation.      Review of Systems:    Constitutional: Negative for chills, fever and weight loss.   HENT: Negative for congestion.    Eyes: Negative for discharge and redness.   Respiratory: Negative for cough and shortness of breath.    Cardiovascular: Negative for chest pain.   Gastrointestinal: Negative for nausea and vomiting.   Musculoskeletal: See HPI.   Skin: Negative for rash.   Neurological: Negative for headaches.   Endo/Heme/Allergies: Does not bruise/bleed easily.   Psychiatric/Behavioral: The patient is not nervous/anxious.    All other systems reviewed and are negative.       Objective:      Physical Examination:    Vital Signs:    Vitals:    05/06/24 1235   BP: 112/68   Resp: 18       Body mass index is 26.3 kg/m².    This a well-developed, well nourished patient in no acute distress.  They are alert and oriented and cooperative to examination.     Examination of the  right shoulder, skin is dry and intact, no erythema or ecchymosis, no signs symptoms of infection.  Range of motion, she has full range of motion in all planes.  180° of forward flexion, 90° of external rotation, internal rotation to the lumbar sacral spine however she does get pain at the endpoints of these movements.  She gets pain with Neer's test pain with crossover testing.  Pain but no weakness with Perry's test.      Pertinent New Results:        XRAY Report / Interpretation:   AP and lateral views of the right shoulder taken today in the office demonstrate acromioclavicular and glenohumeral arthrosis with spurring of the humeral head.          Assessment:       1. Impingement syndrome of right shoulder    2. Glenohumeral arthritis, right    3. Arthritis of right acromioclavicular joint      Plan:     Impingement syndrome of right shoulder  -     X-ray Shoulder 2 or More Views Right  -     Large Joint Aspiration/Injection: R subacromial bursa    Glenohumeral arthritis, right    Arthritis of right acromioclavicular joint        Follow up for 6 wk f/u, left shoulder inj f/u.    68-year-old female with right shoulder pain.  This has been episodic now for few years.  We have injected her a couple of times.  Last injection was over 14 months ago.  X-rays today, she does have some narrowing of the glenohumeral joint space, she does have a inferior osteophyte as well on the humeral head.  Rotator cuff feels grossly intact resisted testing.  We injected her today subacromial space posterior approach lidocaine and triamcinolone she tolerated well.  We will check her back in 6 weeks or so see how she is doing with treatment.  Previously discussed MRI.  If pain persists are not improving we will proceed with advanced imaging.    Jaden Szymanski, XIOMARA, PA-C    This note was created using [employer] voice recognition software that occasionally misinterprets words or phrases.

## 2024-05-23 ENCOUNTER — PATIENT MESSAGE (OUTPATIENT)
Dept: FAMILY MEDICINE | Facility: CLINIC | Age: 69
End: 2024-05-23
Payer: MEDICARE

## 2024-06-12 DIAGNOSIS — G25.81 RLS (RESTLESS LEGS SYNDROME): ICD-10-CM

## 2024-06-12 RX ORDER — ROPINIROLE 2 MG/1
2 TABLET, FILM COATED ORAL NIGHTLY
Qty: 90 TABLET | Refills: 3 | Status: SHIPPED | OUTPATIENT
Start: 2024-06-12

## 2024-06-12 NOTE — TELEPHONE ENCOUNTER
Refill Routing Note   Medication(s) are not appropriate for processing by [facility] for the following reason(s):        Outside of protocol    [facility] action(s):  Route     Requires labs : Yes             Appointments  past 12m or future 3m with PCP    Date Provider   Last Visit   12/11/2023 Liliana Rangel MD   Next Visit   Visit date not found Liliana Rangel MD   ED visits in past 90 days: 0        Note composed:3:19 PM 06/12/2024

## 2024-06-12 NOTE — TELEPHONE ENCOUNTER
Care Due:                  Date            Visit Type   Department     Provider  --------------------------------------------------------------------------------                                ESTABLISHED                              PATIENT -    [facility] FAMILY  Last Visit: 12-      [facility]       Liliana Rangel  Next Visit: None Scheduled  None         None Found                                                            Last  Test          Frequency    Reason                     Performed    Due Date  --------------------------------------------------------------------------------    CBC.........  12 months..  acyclovir................  07- 07-    CMP.........  12 months..  acyclovir, atorvastatin..  07- 07-    Lipid Panel.  12 months..  atorvastatin.............  07- 07-    [facility] Embedded Care Due Messages. Reference number: [id number].   6/12/2024 9:09:59 AM CDT

## 2024-07-12 ENCOUNTER — PATIENT MESSAGE (OUTPATIENT)
Dept: FAMILY MEDICINE | Facility: CLINIC | Age: 69
End: 2024-07-12
Payer: MEDICARE

## 2024-07-12 DIAGNOSIS — F33.1 MODERATE EPISODE OF RECURRENT MAJOR DEPRESSIVE DISORDER: ICD-10-CM

## 2024-07-12 RX ORDER — SERTRALINE HYDROCHLORIDE 100 MG/1
100 TABLET, FILM COATED ORAL DAILY
Qty: 90 TABLET | Refills: 3 | Status: SHIPPED | OUTPATIENT
Start: 2024-07-12

## 2024-07-30 DIAGNOSIS — Z00.00 ENCOUNTER FOR MEDICARE ANNUAL WELLNESS EXAM: ICD-10-CM

## 2024-08-01 DIAGNOSIS — R73.03 PREDIABETES: ICD-10-CM

## 2024-09-07 DIAGNOSIS — E78.2 MIXED HYPERLIPIDEMIA: ICD-10-CM

## 2024-09-07 NOTE — TELEPHONE ENCOUNTER
Care Due:                  Date            Visit Type   Department     Provider  --------------------------------------------------------------------------------                                ESTABLISHED                              PATIENT -    [facility] FAMILY  Last Visit: 12-      [facility]       Liliana Rangel  Next Visit: None Scheduled  None         None Found                                                            Last  Test          Frequency    Reason                     Performed    Due Date  --------------------------------------------------------------------------------    CBC.........  12 months..  acyclovir................  07- 07-    CMP.........  12 months..  acyclovir, atorvastatin..  07- 07-    Lipid Panel.  12 months..  atorvastatin.............  07- 07-    [facility] Embedded Care Due Messages. Reference number: [id number].   9/07/2024 7:01:39 AM CDT

## 2024-09-07 NOTE — TELEPHONE ENCOUNTER
Refill Routing Note   Medication(s) are not appropriate for processing by [facility] for the following reason(s):        Required labs outdated    [facility] action(s):  Defer     Requires labs : Yes             Appointments  past 12m or future 3m with PCP    Date Provider   Last Visit   12/11/2023 Liliana Rangel MD   Next Visit   Visit date not found Liliana Rangel MD   ED visits in past 90 days: 0        Note composed:5:13 PM 09/07/2024

## 2024-09-08 RX ORDER — ATORVASTATIN CALCIUM 40 MG/1
40 TABLET, FILM COATED ORAL
Qty: 90 TABLET | Refills: 0 | Status: SHIPPED | OUTPATIENT
Start: 2024-09-08

## 2024-09-13 ENCOUNTER — HOSPITAL ENCOUNTER (OUTPATIENT)
Dept: RADIOLOGY | Facility: HOSPITAL | Age: 69
Discharge: HOME | End: 2024-09-13
Attending: STUDENT IN AN ORGANIZED HEALTH CARE EDUCATION/TRAINING PROGRAM
Payer: MEDICARE

## 2024-09-13 DIAGNOSIS — Z12.31 ENCOUNTER FOR SCREENING MAMMOGRAM FOR BREAST CANCER: ICD-10-CM

## 2024-09-13 PROCEDURE — 77067 SCR MAMMO BI INCL CAD: CPT | Mod: 26,,, | Performed by: RADIOLOGY

## 2024-09-13 PROCEDURE — 77067 SCR MAMMO BI INCL CAD: CPT | Mod: TC

## 2024-09-13 PROCEDURE — 77063 BREAST TOMOSYNTHESIS BI: CPT | Mod: 26,,, | Performed by: RADIOLOGY

## 2024-09-16 ENCOUNTER — OFFICE VISIT (OUTPATIENT)
Dept: ORTHOPEDICS | Facility: CLINIC | Age: 69
End: 2024-09-16
Payer: MEDICARE

## 2024-09-16 VITALS — HEIGHT: 65 IN | OXYGEN SATURATION: 98 % | RESPIRATION RATE: 18 BRPM | BODY MASS INDEX: 26.33 KG/M2 | WEIGHT: 158.06 LBS

## 2024-09-16 DIAGNOSIS — M17.11 PRIMARY OSTEOARTHRITIS OF RIGHT KNEE: Primary | ICD-10-CM

## 2024-09-16 PROCEDURE — 99214 OFFICE O/P EST MOD 30 MIN: CPT | Mod: PBBFAC,PN | Performed by: PHYSICIAN ASSISTANT

## 2024-09-16 PROCEDURE — 20610 DRAIN/INJ JOINT/BURSA W/O US: CPT | Mod: S$PBB,RT,, | Performed by: PHYSICIAN ASSISTANT

## 2024-09-16 PROCEDURE — 99999PBSHW PR PBB SHADOW TECHNICAL ONLY FILED TO HB: Mod: PBBFAC,,,

## 2024-09-16 PROCEDURE — 20610 DRAIN/INJ JOINT/BURSA W/O US: CPT | Mod: PBBFAC,PN | Performed by: PHYSICIAN ASSISTANT

## 2024-09-16 PROCEDURE — 99213 OFFICE O/P EST LOW 20 MIN: CPT | Mod: S$PBB,25,, | Performed by: PHYSICIAN ASSISTANT

## 2024-09-16 PROCEDURE — 99999 PR PBB SHADOW E&M-EST. PATIENT-LVL IV: CPT | Mod: PBBFAC,,, | Performed by: PHYSICIAN ASSISTANT

## 2024-09-16 RX ORDER — TRIAMCINOLONE ACETONIDE 40 MG/ML
40 INJECTION, SUSPENSION INTRA-ARTICULAR; INTRAMUSCULAR
Status: DISCONTINUED | OUTPATIENT
Start: 2024-09-16 | End: 2024-09-16 | Stop reason: HOSPADM

## 2024-09-16 RX ADMIN — TRIAMCINOLONE ACETONIDE 40 MG: 40 INJECTION, SUSPENSION INTRA-ARTICULAR; INTRAMUSCULAR at 02:09

## 2024-09-16 NOTE — PROGRESS NOTES
[facility] ORTHOPEDICS  [address]  [place] [address]  Phone: [phone]   [phone]    Patient's PCP: Liliana Rangel MD  Referring Provider: No ref. provider found    Subjective:      Chief Complaint:   Chief Complaint   Patient presents with    Right Knee - Pain     Last injected 4/12/24, got relief but pain is back, requesting injection today       Past Medical History:   Diagnosis Date    Anxiety     Arthritis     Back pain     Cancer     thyroid ca and skin ca    Depression     Femoral acetabular impingement 11/06/2017    GERD (gastroesophageal reflux disease)     Migraines     MVP (mitral valve prolapse)     RLS (restless legs syndrome)     Thyroid disease     thyroidectomy    Wears dentures     UPPER AND LOWER BRIDGE    Wears glasses        Past Surgical History:   Procedure Laterality Date    BACK SURGERY      nerve stimulator    COLONOSCOPY      COLONOSCOPY N/A 07/19/2022    Procedure: COLONOSCOPY;  Surgeon: Kuldeep Gill MD;  Location: [facility] ENDO;  Service: Endoscopy;  Laterality: N/A;    HIP ARTHROPLASTY Right 06/25/2018    Procedure: ARTHROPLASTY, HIP;  Surgeon: Prosper Hodge MD;  Location: [facility] OR;  Service: Orthopedics;  Laterality: Right;  william    HIP ARTHROPLASTY Left 12/05/2022    Procedure: ARTHROPLASTY, HIP, LEFT;  Surgeon: Prosper Hodge MD;  Location: [facility] OR;  Service: Orthopedics;  Laterality: Left;    HYSTERECTOMY      implanted spinal cord stimulator      NONFUNCTIONING X 15 YEARS    JOINT REPLACEMENT  June 2017    Right Hip replacement    SACRAL NERVE STIMULATOR PLACEMENT Left     SKIN CANCER EXCISION      THYROIDECTOMY N/A 03/09/2020    Procedure: TOTAL THYROIDECTOMY;  Surgeon: Kelsie Rodgers MD;  Location: [facility] OR;  Service: General;  Laterality: N/A;    TONSILLECTOMY         Current Outpatient Medications   Medication Sig    acyclovir (ZOVIRAX) 400 MG tablet Take 1 tablet (400 mg total) by mouth 2 (two) times daily.    ascorbic acid, vitamin C, (VITAMIN  C) 1000 MG tablet Take 1,000 mg by mouth once daily.    atorvastatin (LIPITOR) 40 MG tablet Take 1 tablet by mouth once daily    ciclopirox 0.77 % Gel SMARTSIG:Topical Morning-Evening    ergocalciferol (ERGOCALCIFEROL) 50,000 unit Cap Take 10,000 Units by mouth once daily.    esomeprazole magnesium (NEXIUM ORAL)     estradioL (ESTRACE) 0.01 % (0.1 mg/gram) vaginal cream 1g per vagina nightly x 2 weeks, then 2-3 times per week    fluconazole (DIFLUCAN) 150 MG Tab Take 1 tablet today, then repeat in 3 days.    ibuprofen (ADVIL,MOTRIN) 200 MG tablet Take 1,000 mg by mouth every 6 (six) hours as needed for Pain.    phytonadione, vit K1, (VITAMIN K1 MISC) by Misc.(Non-Drug; Combo Route) route.    rOPINIRole (REQUIP) 2 MG tablet Take 1 tablet by mouth in the evening    sertraline (ZOLOFT) 100 MG tablet Take 1 tablet (100 mg total) by mouth once daily.    SYNTHROID 112 mcg tablet SMARTSI Tablet(s) By Mouth Daily on Weekdays    SYNTHROID 125 mcg tablet Take 112 mcg by mouth once daily.    nystatin (MYCOSTATIN) ointment Apply topically 2 (two) times daily. for 7 days    terbinafine HCL (LAMISIL) 250 mg tablet Take 250 mg by mouth.     No current facility-administered medications for this visit.     Facility-Administered Medications Ordered in Other Visits   Medication    electrolyte-S (ISOLYTE)    electrolyte-S (ISOLYTE)    fentaNYL 50 mcg/mL injection 25 mcg    LIDOcaine (PF) 10 mg/ml (1%) injection 10 mg    triamcinolone acetonide injection 40 mg       Review of patient's allergies indicates:   Allergen Reactions    Morphine Swelling     NECK SWELLED    Demerol (pf) [meperidine (pf)] Other (See Comments)     HEART RACES, BOUNCES OFF WALL    Erythromycin Itching    Opioids - morphine analogues     Oxycodone-acetaminophen Itching       Family History   Problem Relation Name Age of Onset    Diabetes Father Juan Jose         late onset    Glaucoma Father Juan Jose     Diabetes Other          nephew    Colon cancer Neg Hx       Breast cancer Neg Hx      Colon polyps Neg Hx         Social History     Socioeconomic History    Marital status:     Number of children: 1   Tobacco Use    Smoking status: Former     Current packs/day: 0.00     Average packs/day: 1 pack/day for 13.0 years (13.0 ttl pk-yrs)     Types: Cigarettes     Start date: 1/14/1996     Quit date: 1/14/2009     Years since quitting: 15.6    Smokeless tobacco: Never    Tobacco comments:     I was in high school when I started smoking - have no idea month and day, year was 1972   Substance and Sexual Activity    Alcohol use: Yes     Comment: I drink occasionally - not every week/day.    Drug use: No    Sexual activity: Yes     Partners: Male     Birth control/protection: Post-menopausal, See Surgical Hx     Social Determinants of Health     Financial Resource Strain: Low Risk  (11/14/2023)    Overall Financial Resource Strain (CARDIA)     Difficulty of Paying Living Expenses: Not very hard   Food Insecurity: No Food Insecurity (11/14/2023)    Hunger Vital Sign     Worried About Running Out of Food in the Last Year: Never true     Ran Out of Food in the Last Year: Never true   Transportation Needs: No Transportation Needs (11/14/2023)    PRAPARE - Transportation     Lack of Transportation (Medical): No     Lack of Transportation (Non-Medical): No   Physical Activity: Unknown (11/14/2023)    Exercise Vital Sign     Days of Exercise per Week: 0 days   Stress: Stress Concern Present (11/14/2023)    [employer] - Occupational Stress Questionnaire     Feeling of Stress : To some extent   Housing Stability: Low Risk  (11/14/2023)    Housing Stability Vital Sign     Unable to Pay for Housing in the Last Year: No     Number of Places Lived in the Last Year: 1     Unstable Housing in the Last Year: No       Prior to meeting with the patient I reviewed the medical chart in [facility]. This included reviewing the previous progress notes from our office, review  of the patient's last appointment with their primary care provider, review of any visits to the emergency room, and review of any pain management appointments or procedures.    History of present illness: 69 y.o. female,  returns to clinic today follow-up on the right knee.  She has got chronic right knee pain.  She got a CT arthrogram from last year which show tricompartmental arthrosis.       Review of Systems:    Constitutional: Negative for chills, fever and weight loss.   HENT: Negative for congestion.    Eyes: Negative for discharge and redness.   Respiratory: Negative for cough and shortness of breath.    Cardiovascular: Negative for chest pain.   Gastrointestinal: Negative for nausea and vomiting.   Musculoskeletal: See HPI.   Skin: Negative for rash.   Neurological: Negative for headaches.   Endo/Heme/Allergies: Does not bruise/bleed easily.   Psychiatric/Behavioral: The patient is not nervous/anxious.    All other systems reviewed and are negative.       Objective:      Physical Examination:    Vital Signs:    Vitals:    09/16/24 1400   Resp: 18       Body mass index is 26.3 kg/m².    This a well-developed, well nourished patient in no acute distress.  They are alert and oriented and cooperative to examination.     Examination of the right knee, skin is dry and intact, no erythema or ecchymosis, no signs symptoms of infection, range of motion 0-120 degrees.  Stable anterior-posterior varus and valgus stress.  Homans signs negative, straight leg raise is negative.      Pertinent New Results:          XRAY Report / Interpretation:               Assessment:       1. Primary osteoarthritis of right knee      Plan:     Primary osteoarthritis of right knee  -     Cancel: X-Ray Knee 3 View Right  -     Large Joint Aspiration/Injection: R knee        Follow up if symptoms worsen or fail to improve, for right knee injection 9.16.24 f/u.    Osteoarthritis right knee demonstrated on CT arthrogram.  We have been  injecting her with modest relief in her symptoms.  She is interested in total knee arthroplasty beginning next year.  She is going to follow up with us late November to revisit the conversation sign consents if appropriate.  We reinjected the right knee today for symptomatic treatment.  Anterior lateral approach sterile technique lidocaine and triamcinolone.  She tolerated well.      @XIOMARA Peña PA-C        This note was created using [employer] voice recognition software that occasionally misinterprets words or phrases.

## 2024-09-16 NOTE — PROCEDURES
Large Joint Aspiration/Injection: R knee    Date/Time: 9/16/2024 2:30 PM    Performed by: Jaden Szymanski PA  Authorized by: Jaden Szymanski PA    Consent Done?:  Yes (Verbal)  Indications:  Pain and arthritis  Site marked: the procedure site was marked    Timeout: prior to procedure the correct patient, procedure, and site was verified    Prep: patient was prepped and draped in usual sterile fashion      Local anesthesia used?: Yes    Local anesthetic:  Lidocaine 1% without epinephrine    Details:  Needle Size:  22 G  Ultrasonic Guidance for needle placement?: No    Approach:  Anterolateral  Location:  Knee  Site:  R knee  Medications:  40 mg triamcinolone acetonide 40 mg/mL  Patient tolerance:  Patient tolerated the procedure well with no immediate complications

## 2024-10-02 ENCOUNTER — OFFICE VISIT (OUTPATIENT)
Dept: OBSTETRICS AND GYNECOLOGY | Facility: CLINIC | Age: 69
End: 2024-10-02
Payer: MEDICARE

## 2024-10-02 VITALS
BODY MASS INDEX: 30.3 KG/M2 | HEART RATE: 76 BPM | HEIGHT: 65 IN | DIASTOLIC BLOOD PRESSURE: 74 MMHG | SYSTOLIC BLOOD PRESSURE: 155 MMHG | WEIGHT: 181.88 LBS

## 2024-10-02 DIAGNOSIS — N76.3 CHRONIC VULVITIS: Primary | ICD-10-CM

## 2024-10-02 PROCEDURE — 99214 OFFICE O/P EST MOD 30 MIN: CPT | Mod: PBBFAC,PO | Performed by: STUDENT IN AN ORGANIZED HEALTH CARE EDUCATION/TRAINING PROGRAM

## 2024-10-02 PROCEDURE — 99999 PR PBB SHADOW E&M-EST. PATIENT-LVL IV: CPT | Mod: PBBFAC,,, | Performed by: STUDENT IN AN ORGANIZED HEALTH CARE EDUCATION/TRAINING PROGRAM

## 2024-10-02 RX ORDER — CLOTRIMAZOLE AND BETAMETHASONE DIPROPIONATE 10; .64 MG/G; MG/G
CREAM TOPICAL 2 TIMES DAILY
Qty: 45 G | Refills: 1 | Status: SHIPPED | OUTPATIENT
Start: 2024-10-02

## 2024-10-02 NOTE — PATIENT INSTRUCTIONS
Use Vaseline to protect skin in between treatments  No underwear at night.  Allow skin to breathe  Soak your bottom in warm water with Epson salt and Baking soda two to three times daily for 10 min  Avoid any scented soaps.  OK to use dove sensitive soap.

## 2024-10-02 NOTE — PROGRESS NOTES
Chief Complaint   Patient presents with    Vulvar Itch     Vaginal swelling and itching       History of Present Illness   Gloria Manuel is 69 y.o.   patient who presents today c/o chronic vulva itching for the past 11 mths.  Patient reports notices that the symptoms are worse at nighttime.  Patient denies any internal symptoms.  She was seen by [facility] and was treated with oral diflucan and metrogel as well as starting on vaginal estrace.  Patient reported minimal improvement on follow up and was given mycostatin ointment.  From then, patient has used OTC micoazole, vagicaine, and vagasil cream for pruritus without relief.  She admits to scratching and has noticed the left side is more swollen than right side.  She denies any urinary symptoms.    She is postmenopausal and denies any PMB.    History  PMH: RLS, MDD, Emphysema, HLD, Aortic atherosclerosis, Thyroid cancer s/p sx with postop hypothyroidism, Class I obesity, preDM, OA, Migraines, MVP  Psx: Thyroidectomy, nerve stimulator placement, hysterectomy, tonsils  All: morphine, demerol, erythromycin, oxycodone  OB:   GYN: HSV; Denies any other STIs or abnl Pap  FH: Denies any female/colon cancer or MI prior to 50yo  SH: Denies KANG  Meds:  Current Outpatient Medications   Medication Instructions    acyclovir (ZOVIRAX) 400 mg, Oral, 2 times daily    ascorbic acid (vitamin C) (VITAMIN C) 1,000 mg, Daily    atorvastatin (LIPITOR) 40 mg, Oral    ciclopirox 0.77 % Gel SMARTSIG:Topical Morning-Evening    ergocalciferol (ERGOCALCIFEROL) 10,000 Units, Daily    esomeprazole magnesium (NEXIUM ORAL) No dose, route, or frequency recorded.    estradioL (ESTRACE) 0.01 % (0.1 mg/gram) vaginal cream 1g per vagina nightly x 2 weeks, then 2-3 times per week    ibuprofen (ADVIL,MOTRIN) 1,000 mg, Every 6 hours PRN    nystatin (MYCOSTATIN) ointment Topical (Top), 2 times daily    rOPINIRole (REQUIP) 2 mg, Oral, Nightly    sertraline (ZOLOFT) 100 mg, Oral, Daily  "   SYNTHROID 112 mcg, Daily       Review of Systems   Constitutional:  Negative for chills and fever.   Eyes:  Negative for visual disturbance.   Respiratory:  Negative for cough and shortness of breath.    Cardiovascular:  Negative for chest pain and palpitations.   Gastrointestinal:  Negative for abdominal pain, nausea and vomiting.   Genitourinary:  Negative for vaginal bleeding, vaginal discharge, vaginal pain, postmenopausal bleeding, vaginal dryness and vaginal odor.   Neurological:  Negative for headaches.   Psychiatric/Behavioral:  Negative for depression and sleep disturbance. The patient is not nervous/anxious.    All other systems reviewed and are negative.  Breast: Negative for lump and mastodynia        Physical Examination:  Vitals:    10/02/24 1040   BP: (!) 155/74   BP Location: Left arm   Patient Position: Sitting   Pulse: 76   Weight: 82.5 kg (181 lb 14.1 oz)   Height: 5' 5" (1.651 m)        Physical Exam  Vitals reviewed. Exam conducted with a chaperone present.   Constitutional:       Appearance: Normal appearance.   HENT:      Head: Normocephalic and atraumatic.   Eyes:      Conjunctiva/sclera: Conjunctivae normal.   Cardiovascular:      Rate and Rhythm: Normal rate and regular rhythm.   Pulmonary:      Effort: Pulmonary effort is normal.      Breath sounds: Normal breath sounds. No wheezing.   Abdominal:      General: Abdomen is flat.      Palpations: Abdomen is soft.      Tenderness: There is no abdominal tenderness.   Genitourinary:     Exam position: Lithotomy position.      Labia:         Right: Rash present. No tenderness or lesion.         Left: Rash present. No tenderness or lesion.       Urethra: No prolapse.          Comments: Erythema involving labia majora and minora.  Skin at border of labia majora and minor on L side  slightly.  No bleeding present.    Introitus and vagina appears not affected  Musculoskeletal:         General: Normal range of motion.      Cervical back: " Normal range of motion.   Lymphadenopathy:      Lower Body: No right inguinal adenopathy. No left inguinal adenopathy.   Skin:     General: Skin is warm and dry.   Neurological:      General: No focal deficit present.      Mental Status: She is alert and oriented to person, place, and time.   Psychiatric:         Mood and Affect: Mood normal.         Behavior: Behavior normal.         Thought Content: Thought content normal.         Judgment: Judgment normal.          Assessment:    1. Chronic vulvitis  clotrimazole-betamethasone 1-0.05% (LOTRISONE) cream          Plan:  Discuss chronic condition will need evaluation to rule out vulvar dysplasia after inflammation has decreased.  Will use clotrimazole/bethamethasone to help decrease the inflammation and improve pruritus  Patient to use Vaseline in between to help protect skin from further damage  Discussed avoiding restrictive clothes and avoiding undergarments at night time  Counseled on use of sitz bath to help with swelling and promote healing by drawing more blood flow to area  RV in 2 weeks for follow up    Total time spent including review of record prior to face-to-face visit is 30 minutes. Greater than 80% of the time was spent in counseling and coordination of care. The above assessment and plan have been discussed at length. Labs and procedure reviewed. Problem List and History updated.

## 2024-10-03 ENCOUNTER — TELEPHONE (OUTPATIENT)
Dept: FAMILY MEDICINE | Facility: CLINIC | Age: 69
End: 2024-10-03
Payer: MEDICARE

## 2024-10-15 ENCOUNTER — PATIENT MESSAGE (OUTPATIENT)
Dept: RESEARCH | Facility: HOSPITAL | Age: 69
End: 2024-10-15
Payer: MEDICARE

## 2024-10-16 ENCOUNTER — OFFICE VISIT (OUTPATIENT)
Dept: OBSTETRICS AND GYNECOLOGY | Facility: CLINIC | Age: 69
End: 2024-10-16
Payer: MEDICARE

## 2024-10-16 VITALS
HEART RATE: 66 BPM | SYSTOLIC BLOOD PRESSURE: 122 MMHG | WEIGHT: 174.19 LBS | HEIGHT: 65 IN | BODY MASS INDEX: 29.02 KG/M2 | DIASTOLIC BLOOD PRESSURE: 67 MMHG

## 2024-10-16 DIAGNOSIS — N76.3 CHRONIC VULVITIS: Primary | ICD-10-CM

## 2024-10-16 PROCEDURE — 99999 PR PBB SHADOW E&M-EST. PATIENT-LVL III: CPT | Mod: PBBFAC,,, | Performed by: STUDENT IN AN ORGANIZED HEALTH CARE EDUCATION/TRAINING PROGRAM

## 2024-10-16 PROCEDURE — 99213 OFFICE O/P EST LOW 20 MIN: CPT | Mod: PBBFAC,PO | Performed by: STUDENT IN AN ORGANIZED HEALTH CARE EDUCATION/TRAINING PROGRAM

## 2024-10-16 PROCEDURE — 99213 OFFICE O/P EST LOW 20 MIN: CPT | Mod: S$PBB,,, | Performed by: STUDENT IN AN ORGANIZED HEALTH CARE EDUCATION/TRAINING PROGRAM

## 2024-10-16 NOTE — PROGRESS NOTES
"Chief Complaint   Patient presents with    Follow-up     2 week for itching       History of Present Illness   Gloria Manuel is 69 y.o.   patient who presents up today to follow up.  Patient was noted to have chronic vulvitis and was placed on clotrimazole/bethamethasone as well as sitz bath for treatment.  Patient reports marked improvement in symptoms.  She still has slight pruritus but has not been scratching.    Review of Systems   Constitutional:  Negative for chills and fever.   Eyes:  Negative for visual disturbance.   Respiratory:  Negative for cough and shortness of breath.    Cardiovascular:  Negative for chest pain and palpitations.   Gastrointestinal:  Negative for abdominal pain, nausea and vomiting.   Genitourinary:  Negative for vaginal discharge, vaginal pain and vaginal odor.   Neurological:  Negative for headaches.   Psychiatric/Behavioral:  Negative for depression and sleep disturbance. The patient is not nervous/anxious.    All other systems reviewed and are negative.  Breast: Negative for lump and mastodynia        Physical Examination:  Vitals:    10/16/24 1011   BP: 122/67   BP Location: Left arm   Patient Position: Sitting   Pulse: 66   Weight: 79 kg (174 lb 2.6 oz)   Height: 5' 5" (1.651 m)        Physical Exam  Vitals reviewed. Exam conducted with a chaperone present.   Constitutional:       Appearance: Normal appearance.   HENT:      Head: Normocephalic and atraumatic.   Eyes:      Extraocular Movements: Extraocular movements intact.   Abdominal:      Hernia: There is no hernia in the right inguinal area.   Genitourinary:     Exam position: Lithotomy position.      Labia:         Right: No tenderness, lesion or injury.         Left: No tenderness, lesion or injury.       Urethra: No prolapse.      Comments: Erythema resolved.  Lacerated skin near junction of labia majora and minor healed.   Musculoskeletal:         General: Normal range of motion.   Lymphadenopathy:      " Lower Body: No left inguinal adenopathy.   Skin:     General: Skin is warm and dry.   Neurological:      General: No focal deficit present.      Mental Status: She is alert and oriented to person, place, and time.   Psychiatric:         Mood and Affect: Mood normal.         Behavior: Behavior normal.        Assessment:    1. Chronic vulvitis            Plan:  Continue management as directed  Plan for vulva colposcopy due to chronicity of symptoms  RV in 2 weeks

## 2024-11-11 ENCOUNTER — OFFICE VISIT (OUTPATIENT)
Dept: OBSTETRICS AND GYNECOLOGY | Facility: CLINIC | Age: 69
End: 2024-11-11
Payer: MEDICARE

## 2024-11-11 VITALS
SYSTOLIC BLOOD PRESSURE: 124 MMHG | WEIGHT: 171.5 LBS | BODY MASS INDEX: 28.57 KG/M2 | DIASTOLIC BLOOD PRESSURE: 68 MMHG | HEIGHT: 65 IN

## 2024-11-11 DIAGNOSIS — N76.3 CHRONIC VULVITIS: Primary | ICD-10-CM

## 2024-11-11 PROCEDURE — 99213 OFFICE O/P EST LOW 20 MIN: CPT | Mod: PBBFAC,PO | Performed by: STUDENT IN AN ORGANIZED HEALTH CARE EDUCATION/TRAINING PROGRAM

## 2024-11-11 PROCEDURE — 56820 COLPOSCOPY VULVA: CPT | Mod: S$PBB,,, | Performed by: STUDENT IN AN ORGANIZED HEALTH CARE EDUCATION/TRAINING PROGRAM

## 2024-11-11 PROCEDURE — 99499 UNLISTED E&M SERVICE: CPT | Mod: S$PBB,,, | Performed by: STUDENT IN AN ORGANIZED HEALTH CARE EDUCATION/TRAINING PROGRAM

## 2024-11-11 PROCEDURE — 56820 COLPOSCOPY VULVA: CPT | Mod: PBBFAC,PO | Performed by: STUDENT IN AN ORGANIZED HEALTH CARE EDUCATION/TRAINING PROGRAM

## 2024-11-11 PROCEDURE — 99999 PR PBB SHADOW E&M-EST. PATIENT-LVL III: CPT | Mod: PBBFAC,,, | Performed by: STUDENT IN AN ORGANIZED HEALTH CARE EDUCATION/TRAINING PROGRAM

## 2024-11-11 NOTE — PROGRESS NOTES
Patient with chronic vulvitis here for vulva colposcopy.  No complaints today.    Colposcopy Procedure Note    Indications: Chronic vulvitis    Procedure Details   The risks and benefits of the procedure and Verbal informed consent obtained.    Patient was placed in the lithotomy position.  Guaze soaked in acetic acid was placed over vulva over 3 min.  The guaze was removed and the vulva inspected carefully for aceto white changes.    Findings:  Cervix: not done  Vaginal inspection: vaginal colposcopy not performed.  Vulvar colposcopy: acetic acid applied o vulvar tissue and normal mucosa without lesions.    Specimens: none    Complications: none.    Plan:  Return as needed

## 2024-11-18 ENCOUNTER — PATIENT MESSAGE (OUTPATIENT)
Dept: FAMILY MEDICINE | Facility: CLINIC | Age: 69
End: 2024-11-18

## 2024-11-18 ENCOUNTER — OFFICE VISIT (OUTPATIENT)
Dept: FAMILY MEDICINE | Facility: CLINIC | Age: 69
End: 2024-11-18
Payer: MEDICARE

## 2024-11-18 VITALS
RESPIRATION RATE: 14 BRPM | OXYGEN SATURATION: 96 % | HEIGHT: 65 IN | DIASTOLIC BLOOD PRESSURE: 78 MMHG | BODY MASS INDEX: 28.57 KG/M2 | HEART RATE: 65 BPM | WEIGHT: 171.5 LBS | SYSTOLIC BLOOD PRESSURE: 126 MMHG

## 2024-11-18 DIAGNOSIS — E78.2 MIXED HYPERLIPIDEMIA: ICD-10-CM

## 2024-11-18 DIAGNOSIS — Z85.850 HISTORY OF THYROID CANCER: ICD-10-CM

## 2024-11-18 DIAGNOSIS — I70.0 AORTIC ATHEROSCLEROSIS: ICD-10-CM

## 2024-11-18 DIAGNOSIS — J43.2 CENTRILOBULAR EMPHYSEMA: ICD-10-CM

## 2024-11-18 DIAGNOSIS — F33.1 MODERATE EPISODE OF RECURRENT MAJOR DEPRESSIVE DISORDER: ICD-10-CM

## 2024-11-18 DIAGNOSIS — Z00.00 ENCOUNTER FOR PHYSICAL EXAMINATION: Primary | ICD-10-CM

## 2024-11-18 PROCEDURE — 99214 OFFICE O/P EST MOD 30 MIN: CPT | Mod: PBBFAC,PN | Performed by: NURSE PRACTITIONER

## 2024-11-18 PROCEDURE — 99999 PR PBB SHADOW E&M-EST. PATIENT-LVL IV: CPT | Mod: PBBFAC,,, | Performed by: NURSE PRACTITIONER

## 2024-11-18 RX ORDER — ATORVASTATIN CALCIUM 40 MG/1
40 TABLET, FILM COATED ORAL NIGHTLY
Qty: 90 TABLET | Refills: 1 | Status: SHIPPED | OUTPATIENT
Start: 2024-11-18

## 2024-11-18 NOTE — PROGRESS NOTES
Subjective:       Patient ID: Gloria Manuel is a 69 y.o. female.    Chief Complaint: Annual Exam    Gloria Manuel presents to the clinic today for annual   Established patient within the clinic, new patient to myself    Under the care of the following:  PCP: Dr. Rangel  OBGYN: Dr. Yessica Saba  Endocrinology: Dr. FRANCESCO Ross  Optometry: Dr. Rebeca Zuniga    Patient Active Problem List  Diagnosis  · Osteoarthritis of right hip  · Depression  · RLS (restless legs syndrome)  · Postoperative hypothyroidism  · Seasonal affective disorder  · Osteoporosis  · Mixed hyperlipidemia  · Gastro-esophageal reflux disease with esophagitis  · History of thyroid cancer  · Herpes labialis  · Class 1 obesity due to excess calories without serious comorbidity with body mass index (BMI) of 33.0 to 33.9 in adult  · Prediabetes  · Aortic atherosclerosis  · Centrilobular emphysema  · Chronic vulvitis        Review of Systems    Patient Active Problem List   Diagnosis    Osteoarthritis of right hip    Depression    RLS (restless legs syndrome)    Postoperative hypothyroidism    Seasonal affective disorder    Osteoporosis    Mixed hyperlipidemia    Gastro-esophageal reflux disease with esophagitis    History of thyroid cancer    Herpes labialis    Class 1 obesity due to excess calories without serious comorbidity with body mass index (BMI) of 33.0 to 33.9 in adult    Prediabetes    Aortic atherosclerosis    Centrilobular emphysema    Chronic vulvitis    Moderate episode of recurrent major depressive disorder       Objective:      Physical Exam  Constitutional:       General: She is not in acute distress.     Appearance: Normal appearance. She is not ill-appearing.   Eyes:      Conjunctiva/sclera: Conjunctivae normal.   Cardiovascular:      Rate and Rhythm: Normal rate and regular rhythm.      Heart sounds: Normal heart sounds. No murmur heard.  Pulmonary:      Effort: Pulmonary effort is normal. No respiratory distress.      Breath  "sounds: Normal breath sounds.   Musculoskeletal:      Right lower leg: No edema.      Left lower leg: No edema.   Skin:     General: Skin is warm and dry.   Neurological:      Mental Status: She is alert. Mental status is at baseline.      Gait: Gait normal.   Psychiatric:         Mood and Affect: Mood normal.         Behavior: Behavior normal.         Thought Content: Thought content normal.         Judgment: Judgment normal.         Lab Results   Component Value Date    WBC 7.91 07/27/2023    HGB 12.9 07/27/2023    HCT 39.8 07/27/2023     07/27/2023    CHOL 256 (H) 07/27/2023    TRIG 146 07/27/2023    HDL 54 07/27/2023    ALT 10 07/27/2023    AST 13 07/27/2023     08/08/2023    K 3.7 08/08/2023     08/08/2023    CREATININE 0.9 08/08/2023    BUN 10 08/08/2023    CO2 23 08/08/2023    TSH 7.056 (H) 08/08/2023    HGBA1C 5.4 07/27/2023     The 10-year ASCVD risk score (Esau BLUNT, et al., 2019) is: 9.9%    Values used to calculate the score:      Age: 69 years      Sex: Female      Is Non- : No      Diabetic: No      Tobacco smoker: No      Systolic Blood Pressure: 126 mmHg      Is BP treated: No      HDL Cholesterol: 54 mg/dL      Total Cholesterol: 256 mg/dL  Visit Vitals  /78 (BP Location: Left arm, Patient Position: Sitting)   Pulse 65   Resp 14   Ht 5' 5" (1.651 m)   Wt 77.8 kg (171 lb 8 oz)   SpO2 96%   BMI 28.54 kg/m²      Assessment:       1. Encounter for physical examination    2. Mixed hyperlipidemia    3. Moderate episode of recurrent major depressive disorder    4. Centrilobular emphysema    5. Aortic atherosclerosis    6. History of thyroid cancer        Plan:       1. Encounter for physical examination  Discussed preventatives/vaccines with patient. Declines additional vaccines at this time  Due for Medicare Annual Wellness- to be scheduled.   2. Mixed hyperlipidemia  -     Lipid panel; Future; Expected date: 11/18/2024  -     atorvastatin (LIPITOR) 40 MG " tablet; Take 1 tablet (40 mg total) by mouth every evening.  Dispense: 90 tablet; Refill: 1    3. Moderate episode of recurrent major depressive disorder  Stable with current medication regimen. Continue Zoloft   4. Centrilobular emphysema  Hx of smoking, has since quit  5. Aortic atherosclerosis  Continue atorvastatin/ASA therapy  Follow low fat/low cholesterol diet  Obtain fasting Lipid   6. History of thyroid cancer  Followed by Endocrinology- keep scheduled appointments/ blood work      No follow-ups on file.      Future Appointments       Date Provider Specialty Appt Notes    12/5/2024 Prosper Hodge MD Orthopedics Rt Knee/discuss surgery    2/24/2025 Liliana Rangel MD Family Medicine f/u

## 2024-12-05 ENCOUNTER — OFFICE VISIT (OUTPATIENT)
Dept: ORTHOPEDICS | Facility: CLINIC | Age: 69
End: 2024-12-05
Payer: MEDICARE

## 2024-12-05 ENCOUNTER — PATIENT MESSAGE (OUTPATIENT)
Dept: ORTHOPEDICS | Facility: CLINIC | Age: 69
End: 2024-12-05
Payer: MEDICARE

## 2024-12-05 VITALS — HEIGHT: 65 IN | BODY MASS INDEX: 28.57 KG/M2 | WEIGHT: 171.5 LBS

## 2024-12-05 DIAGNOSIS — Z01.818 PRE-OP TESTING: ICD-10-CM

## 2024-12-05 DIAGNOSIS — M17.11 PRIMARY OSTEOARTHRITIS OF RIGHT KNEE: Primary | ICD-10-CM

## 2024-12-05 PROCEDURE — 99999 PR PBB SHADOW E&M-EST. PATIENT-LVL IV: CPT | Mod: PBBFAC,,, | Performed by: ORTHOPAEDIC SURGERY

## 2024-12-05 PROCEDURE — 99214 OFFICE O/P EST MOD 30 MIN: CPT | Mod: PBBFAC,PN | Performed by: ORTHOPAEDIC SURGERY

## 2024-12-05 RX ORDER — CEFAZOLIN SODIUM 2 G/50ML
2 SOLUTION INTRAVENOUS
OUTPATIENT
Start: 2024-12-05

## 2024-12-05 NOTE — PROGRESS NOTES
[facility]    Subjective:     Chief Complaint:   Chief Complaint   Patient presents with    Right Knee - Pain     To discuss right knee surgery, no pain today, pain is off and on,pops a lot, no giving way       Past Medical History:   Diagnosis Date    Anxiety     Arthritis     Back pain     Cancer     thyroid ca and skin ca    Depression     Femoral acetabular impingement 11/06/2017    GERD (gastroesophageal reflux disease)     Migraines     MVP (mitral valve prolapse)     RLS (restless legs syndrome)     Thyroid disease     thyroidectomy    Wears dentures     UPPER AND LOWER BRIDGE    Wears glasses        Past Surgical History:   Procedure Laterality Date    COLONOSCOPY      COLONOSCOPY N/A 07/19/2022    Procedure: COLONOSCOPY;  Surgeon: Kuldeep Gill MD;  Location: [facility] ENDO;  Service: Endoscopy;  Laterality: N/A;    HIP ARTHROPLASTY Right 06/25/2018    Procedure: ARTHROPLASTY, HIP;  Surgeon: Prosper Hodge MD;  Location: [facility] OR;  Service: Orthopedics;  Laterality: Right;  william    HIP ARTHROPLASTY Left 12/05/2022    Procedure: ARTHROPLASTY, HIP, LEFT;  Surgeon: Prosper Hodge MD;  Location: [facility] OR;  Service: Orthopedics;  Laterality: Left;    HYSTERECTOMY      JOINT REPLACEMENT  June 2017    Right Hip replacement    SACRAL NERVE STIMULATOR PLACEMENT Left     SKIN CANCER EXCISION      THYROIDECTOMY N/A 03/09/2020    Procedure: TOTAL THYROIDECTOMY;  Surgeon: Kelsie Rodgers MD;  Location: [facility] OR;  Service: General;  Laterality: N/A;    TONSILLECTOMY         Current Outpatient Medications   Medication Sig    acyclovir (ZOVIRAX) 400 MG tablet Take 1 tablet (400 mg total) by mouth 2 (two) times daily.    ascorbic acid, vitamin C, (VITAMIN C) 1000 MG tablet Take 1,000 mg by mouth once daily.    atorvastatin (LIPITOR) 40 MG tablet Take 1 tablet (40 mg total) by mouth every evening.    ciclopirox 0.77 % Gel SMARTSIG:Topical Morning-Evening    ergocalciferol (ERGOCALCIFEROL) 50,000 unit Cap Take 10,000  Units by mouth once daily.    esomeprazole magnesium (NEXIUM ORAL)     estradioL (ESTRACE) 0.01 % (0.1 mg/gram) vaginal cream 1g per vagina nightly x 2 weeks, then 2-3 times per week    ibuprofen (ADVIL,MOTRIN) 200 MG tablet Take 1,000 mg by mouth every 6 (six) hours as needed for Pain.    nystatin (MYCOSTATIN) ointment Apply topically 2 (two) times daily. for 7 days    phytonadione, vit K1, (VITAMIN K1 MISC) by Misc.(Non-Drug; Combo Route) route.    rOPINIRole (REQUIP) 2 MG tablet Take 1 tablet by mouth in the evening    sertraline (ZOLOFT) 100 MG tablet Take 1 tablet (100 mg total) by mouth once daily.    SYNTHROID 112 mcg tablet Take 112 mcg by mouth before breakfast. Takes 5 days weekly opposite 125 mcg    SYNTHROID 125 mcg tablet Take 125 mcg by mouth twice a week. Takes on Monday and Friday     No current facility-administered medications for this visit.     Facility-Administered Medications Ordered in Other Visits   Medication    electrolyte-S (ISOLYTE)    electrolyte-S (ISOLYTE)    fentaNYL 50 mcg/mL injection 25 mcg    LIDOcaine (PF) 10 mg/ml (1%) injection 10 mg    triamcinolone acetonide injection 40 mg       Review of patient's allergies indicates:   Allergen Reactions    Morphine Swelling     NECK SWELLED    Demerol (pf) [meperidine (pf)] Other (See Comments)     HEART RACES, BOUNCES OFF WALL    Erythromycin Itching    Oxycodone-acetaminophen Itching       Family History   Problem Relation Name Age of Onset    Diabetes Father Juan Jose         late onset    Glaucoma Father Juan Jose     Diabetes Other          nephew    Colon cancer Neg Hx      Breast cancer Neg Hx      Colon polyps Neg Hx         Social History     Socioeconomic History    Marital status:     Number of children: 1   Tobacco Use    Smoking status: Former     Current packs/day: 0.00     Average packs/day: 1 pack/day for 13.0 years (13.0 ttl pk-yrs)     Types: Cigarettes     Start date: 1/14/1996     Quit date: 1/14/2009     Years  since quitting: 15.9    Smokeless tobacco: Never    Tobacco comments:     I was in high school when I started smoking - have no idea month and day, year was 1972   Substance and Sexual Activity    Alcohol use: Yes     Comment: I drink occasionally - not every week/day.    Drug use: No    Sexual activity: Yes     Partners: Male     Birth control/protection: Post-menopausal, See Surgical Hx     Social Drivers of Health     Financial Resource Strain: Low Risk  (11/14/2023)    Overall Financial Resource Strain (CARDIA)     Difficulty of Paying Living Expenses: Not very hard   Food Insecurity: No Food Insecurity (11/14/2023)    Hunger Vital Sign     Worried About Running Out of Food in the Last Year: Never true     Ran Out of Food in the Last Year: Never true   Transportation Needs: No Transportation Needs (11/14/2023)    PRAPARE - Transportation     Lack of Transportation (Medical): No     Lack of Transportation (Non-Medical): No   Physical Activity: Unknown (11/14/2023)    Exercise Vital Sign     Days of Exercise per Week: 0 days   Stress: Stress Concern Present (11/14/2023)    [employer] - Occupational Stress Questionnaire     Feeling of Stress : To some extent   Housing Stability: Low Risk  (11/14/2023)    Housing Stability Vital Sign     Unable to Pay for Housing in the Last Year: No     Number of Places Lived in the Last Year: 1     Unstable Housing in the Last Year: No       History of present illness:  Ms. Castro comes in today for follow-up for her right knee.  She has had her CT scan.  She comes in today with those results for review.  We have been managing her right knee pain with intermittent intra-articular corticosteroid injections.  They do provide some relief but they have been not particularly efficacious here recently and not particularly long-lasting.  Her pain is worse with weight-bearing activities.  It is starting to interfere with her ADLs.    Review of  Systems:    Constitution: Negative for chills, fever, and sweats.  Negative for unexplained weight loss.    HENT:  Negative for headaches and blurry vision.    Cardiovascular:Negative for chest pain or irregular heart beat. Negative for hypertension.    Respiratory:  Negative for cough and shortness of breath.    Gastrointestinal: Negative for abdominal pain, heartburn, melena, nausea, and vomitting.    Genitourinary:  Negative bladder incontinence and dysuria.    Musculoskeletal:  See HPI for details.     Neurological: Negative for numbness.    Psychiatric/Behavioral: Negative for depression.  The patient is not nervous/anxious.      Endocrine: Negative for polyuria    Hematologic/Lymphatic: Negative for bleeding problem.  Does not bruise/bleed easily.    Skin: Negative for poor would healing and rash    Objective:      Physical Examination:    Vital Signs:  There were no vitals filed for this visit.    Body mass index is 28.54 kg/m².    This a well-developed, well nourished patient in no acute distress.  They are alert and oriented and cooperative to examination.        Right knee exam: Skin to the right knee clean dry and intact.  No erythema or ecchymosis.  No signs or symptoms of infection.  She is neurovascularly intact throughout the right lower extremity.  Homans on the right.  She can weightbear as tolerated right lower extremity.  Mildly antalgic gait.  Right knee range of motion well-preserved at 0-120 degrees.  The knee is stable to varus and valgus stresses.  There is no effusion.    Pertinent New Results:    XRAY Report / Interpretation:   No new radiographs taken on today's clinic visit.  Did review images report of her right knee CT scan which does reveal advanced tricompartmental osteoarthritis with full-thickness chondral loss.    Assessment/Plan:      1. Right knee osteoarthritis.      Risks and benefits of proceeding with right total knee arthroplasty were discussed with her today in detail.  We  "discussed the outpatient nature of the procedure.  We discussed the use of the Dariusz robot system.  We discussed the risk of infection and DVT and methods we used to reduce these risks.  All of her questions were answered.  She understood and wished to proceed.  Surgical consents were obtained today.    Risks of the procedure were reviewed with the patient.  This includes, but is not limited to:  infection, bleeding, pain, swelling, decreased range of motion, nerve injury/damage, numbness, leg length discrepancy, wound dehiscence, hardware failure, deep vein thrombosis, pulmonary embolism, reflex sympathetic dystrophy, and possible need for further surgery.    The mobility limitation cannot be sufficiently resolved by the use of a cane. Patient's functional mobility deficit can be sufficiently resolved with the use of a (Rolling Walker or Walker). Patient's mobility limitation significantly impairs their ability to participate in one of more activities of daily living. The use of a (RW or Walker) will significantly improve the patient's ability to participate in MRADLS and the patient will use it on regular basis in the home.    Deni Hill, Physician Assistant, served in the capacity as a "scribe" for this patient encounter.  A "face-to-face" encounter occurred with Dr. Prosper Hodge on this date.  The treatment plan and medical decision-making is outlined above. Patient was seen and examined with a chaperone.       This note was created using Dragon voice recognition software that occasionally misinterpreted phrases or words.          "

## 2025-01-13 ENCOUNTER — PATIENT MESSAGE (OUTPATIENT)
Dept: ORTHOPEDICS | Facility: CLINIC | Age: 70
End: 2025-01-13
Payer: MEDICARE

## 2025-01-14 ENCOUNTER — TELEPHONE (OUTPATIENT)
Dept: ORTHOPEDICS | Facility: CLINIC | Age: 70
End: 2025-01-14
Payer: MEDICARE

## 2025-01-14 NOTE — TELEPHONE ENCOUNTER
----- Message from Med Assistant Mccann sent at 1/13/2025  4:19 PM CST -----  Patient does want to move surgery up to 01/27/25. Please call

## 2025-01-14 NOTE — TELEPHONE ENCOUNTER
Spoke with the patient. R/s surgery to 1/27/2025. Pre-op appointments rescheduled to tomorrow. Post-op appointments rescheduled as well. Secure chat sent to [facility] to r/s darby Braswell, notified of surgery date change for authorization purposes.

## 2025-01-15 ENCOUNTER — HOSPITAL ENCOUNTER (OUTPATIENT)
Dept: PREADMISSION TESTING | Facility: HOSPITAL | Age: 70
Discharge: HOME | End: 2025-01-15
Attending: ORTHOPAEDIC SURGERY
Payer: MEDICARE

## 2025-01-15 ENCOUNTER — HOSPITAL ENCOUNTER (OUTPATIENT)
Dept: RADIOLOGY | Facility: HOSPITAL | Age: 70
Discharge: HOME | End: 2025-01-15
Attending: ORTHOPAEDIC SURGERY
Payer: MEDICARE

## 2025-01-15 VITALS — HEIGHT: 65 IN | BODY MASS INDEX: 27.99 KG/M2 | WEIGHT: 168 LBS

## 2025-01-15 DIAGNOSIS — M17.11 PRIMARY OSTEOARTHRITIS OF RIGHT KNEE: ICD-10-CM

## 2025-01-15 DIAGNOSIS — Z01.818 PRE-OP TESTING: Primary | ICD-10-CM

## 2025-01-15 LAB
ALBUMIN SERPL BCP-MCNC: 3.5 G/DL (ref 3.5–5.2)
ALP SERPL-CCNC: 80 U/L (ref 40–150)
ALT SERPL W/O P-5'-P-CCNC: 15 U/L (ref 10–44)
ANION GAP SERPL CALC-SCNC: 11 MMOL/L (ref 8–16)
AST SERPL-CCNC: 16 U/L (ref 10–40)
BASOPHILS # BLD AUTO: 0.06 K/UL (ref 0–0.2)
BASOPHILS NFR BLD: 0.9 % (ref 0–1.9)
BILIRUB SERPL-MCNC: 0.5 MG/DL (ref 0.1–1)
BUN SERPL-MCNC: 8 MG/DL (ref 8–23)
CALCIUM SERPL-MCNC: 9.1 MG/DL (ref 8.7–10.5)
CHLORIDE SERPL-SCNC: 108 MMOL/L (ref 95–110)
CO2 SERPL-SCNC: 25 MMOL/L (ref 23–29)
CREAT SERPL-MCNC: 0.7 MG/DL (ref 0.5–1.4)
DIFFERENTIAL METHOD BLD: ABNORMAL
EOSINOPHIL # BLD AUTO: 0.2 K/UL (ref 0–0.5)
EOSINOPHIL NFR BLD: 2.6 % (ref 0–8)
ERYTHROCYTE [DISTWIDTH] IN BLOOD BY AUTOMATED COUNT: 12.9 % (ref 11.5–14.5)
EST. GFR  (NO RACE VARIABLE): >60 ML/MIN/1.73 M^2
GLUCOSE SERPL-MCNC: 81 MG/DL (ref 70–110)
HCT VFR BLD AUTO: 41.3 % (ref 37–48.5)
HGB BLD-MCNC: 13.6 G/DL (ref 12–16)
IMM GRANULOCYTES # BLD AUTO: 0.02 K/UL (ref 0–0.04)
IMM GRANULOCYTES NFR BLD AUTO: 0.3 % (ref 0–0.5)
LYMPHOCYTES # BLD AUTO: 1.5 K/UL (ref 1–4.8)
LYMPHOCYTES NFR BLD: 23 % (ref 18–48)
MCH RBC QN AUTO: 31.9 PG (ref 27–31)
MCHC RBC AUTO-ENTMCNC: 32.9 G/DL (ref 32–36)
MCV RBC AUTO: 97 FL (ref 82–98)
MONOCYTES # BLD AUTO: 0.5 K/UL (ref 0.3–1)
MONOCYTES NFR BLD: 7.1 % (ref 4–15)
NEUTROPHILS # BLD AUTO: 4.4 K/UL (ref 1.8–7.7)
NEUTROPHILS NFR BLD: 66.1 % (ref 38–73)
NRBC BLD-RTO: 0 /100 WBC
PLATELET # BLD AUTO: 232 K/UL (ref 150–450)
PMV BLD AUTO: 10.3 FL (ref 9.2–12.9)
POTASSIUM SERPL-SCNC: 2.8 MMOL/L (ref 3.5–5.1)
PROT SERPL-MCNC: 6.3 G/DL (ref 6–8.4)
RBC # BLD AUTO: 4.26 M/UL (ref 4–5.4)
SODIUM SERPL-SCNC: 144 MMOL/L (ref 136–145)
WBC # BLD AUTO: 6.66 K/UL (ref 3.9–12.7)

## 2025-01-15 PROCEDURE — 93005 ELECTROCARDIOGRAM TRACING: CPT

## 2025-01-15 PROCEDURE — 93010 ELECTROCARDIOGRAM REPORT: CPT | Mod: ,,, | Performed by: GENERAL PRACTICE

## 2025-01-15 PROCEDURE — 73700 CT LOWER EXTREMITY W/O DYE: CPT | Mod: 26,RT,, | Performed by: RADIOLOGY

## 2025-01-15 PROCEDURE — 73700 CT LOWER EXTREMITY W/O DYE: CPT | Mod: TC,RT

## 2025-01-15 PROCEDURE — 80053 COMPREHEN METABOLIC PANEL: CPT | Performed by: ORTHOPAEDIC SURGERY

## 2025-01-15 PROCEDURE — 85025 COMPLETE CBC W/AUTO DIFF WBC: CPT | Performed by: ORTHOPAEDIC SURGERY

## 2025-01-15 PROCEDURE — 36415 COLL VENOUS BLD VENIPUNCTURE: CPT | Performed by: ORTHOPAEDIC SURGERY

## 2025-01-15 RX ORDER — DIPHENOXYLATE HYDROCHLORIDE AND ATROPINE SULFATE 2.5; .025 MG/1; MG/1
1 TABLET ORAL 4 TIMES DAILY PRN
COMMUNITY
Start: 2024-12-13

## 2025-01-15 NOTE — PRE ADMISSION SCREENING
"               [facility] Risk Assessment Scale    Patient Name: Glroia Manuel  YOB: 1955  MRN: [id number]            RIsk Factor Measure Recommendation Patient Data Scale/Score   BMI >40 Reconsider surgery, weight loss   Estimated body mass index is 27.96 kg/m² as calculated from the following:    Height as of this encounter: 5' 5" (1.651 m).    Weight as of this encounter: 76.2 kg (168 lb).   [] 0 = 1 - 24.9  [x] 1 = 25-29.9  [] 2 = 30-34.9  [] 3 = 35-39.9  [] 4 = 40-44.9  [] 5 = 45-99.9   Hemoglobin AIC (if applicable) >9 Delay surgery until DM under control  Refer for:  Nutrition Therapy  Exercise   Medication    Lab Results   Component Value Date    HGBA1C 5.4 07/27/2023       Lab Results   Component Value Date    GLU 81 01/15/2025      [x] 0 = 4.0-5.6  [] 1 = 5.7-6.4  [] 2 = 6.5-6.9  [] 3 = 7.0-7.9  [] 4 = 8.0-8.9  [] 5 = 9.0-12   Hemoglobin (Anemia) <9 Delay surgery   Correct anemia Lab Results   Component Value Date    HGB 13.6 01/15/2025    [] 20 - <9.0                    Albumin <3 Delay surgery &Workup Lab Results   Component Value Date    ALBUMIN 3.5 01/15/2025    [] 20 - <3.0   Smoking Cessation >4 Weeks Delay Surgery  Refer to OP Cessation Class    Former Smoker  Quit: 2009 [] 20 - current smoker                                _____ PPD                    Hx of MI, PE, Arrhythmia, CVA, DVT <30 Days Delay Surgery    N/A [] 20      Infection Variable Delay surgery and re-evaluate   N/A [] 20 - recent/current infection     Depression (PHQ) >10 out of 27 Delay Surgery and re-evaluate  Medication  Counseling              [x] 0     []1     []2     []3      []4      [] 5                    (1-4)      (5-9)  (10-14)  (15-19)   (20-27)     Memory Impairment & Memory loss (Mini-Cog Screening Tool) Advanced dementia and/or Parkinson's Reconsider surgery     [x] 0     []1     []2     []3     []4     [] 5     Physical Conditioning (Modified AM-PAC Per Physical Therapy at [facility]) Unable to ambulate " on day of surgery Delay surgery and re-evaluate  Pre-Rehabilitation   (PT evaluation)       []  0   [x]4       []8     []12        []16     []20       (<20%)   (<40%)   (<60%)   (<80% )    (>80%)     Home Environment/Caregiver support  (Per /Navigator Interview)    Availability of basic services and/or approprate assistance during post-operative period Delay surgery and re-evaluate  Safe home environment  Health   1 week post-surgery  Transportation  availability  Ability to obtain DME/Medications post-op    [x] 0     []1     []2     []3     []4     [] 5  [x] 0     []1     []2     []3     []4     [] 5  [x] 0     []1     []2     []3     []4     [] 5  [x] 0     []1     []2     []3     []4     [] 5         MD Contact: Dr. Hodge Comments:  Total Score:  5

## 2025-01-15 NOTE — OR NURSING
Latest Reference Range & Units 01/15/25 10:16   Potassium 3.5 - 5.1 mmol/L 2.8 (LL)   (LL): Data is critically low        Informed Lauren Abadie with Dr. Hodge's office she stated she would call the patient and have repeated the Friday prior to surgery.

## 2025-01-15 NOTE — DISCHARGE INSTRUCTIONS
To confirm, Your doctor has instructed you that surgery is scheduled for: 1/27/25 WITH DR. BURGESS    Please report to [facility], Registration the morning of surgery. You must check-in and receive a wristband before going to your procedure.  [address] [place] [address] [place] [address]    Pre-Op will call the FRIDAYprior to surgery between 1:00 and 6:00 PM with the final arrival time.  Phone number: [phone]    PLEASE NOTE:  The surgery schedule has many variables which may affect the time of your surgery case.  Family members should be available if your surgery time changes.  Plan to be here the day of your procedure between 4-6 hours.    MEDICATIONS:  TAKE ONLY THESE MEDICATIONS WITH A SMALL SIP OF WATER THE MORNING OF YOUR PROCEDURE:  SEE MED LIST      DO NOT TAKE THESE MEDICATIONS 5-7 DAYS PRIOR to your procedure or per your surgeon's request:   ASPIRIN, ALEVE, ADVIL, IBUPROFEN, FISH OIL VITAMIN E, HERBALS  (May take Tylenol)    ONLY if you are prescribed any types of blood thinners such as:  Aspirin, Coumadin, Plavix, Pradaxa, Xarelto, Aggrenox, Effient, Eliquis, Savasya, Brilinta, or any other, ask your surgeon whether you should stop taking them and how long before surgery you should stop.  You may also need to verify with the prescribing physician if it is ok to stop your medication.      INSTRUCTIONS IMPORTANT!!  Do not eat or drink anything between midnight and the time of your procedure- this includes gum, mints, and candy.  EXCEPT: you may have clear liquids such as:  WATER, BLACK COFFEE, UNSWEET TEA, OR GATORADE (NO RED OR PURPLE) UP TO 2 HOURS PRIOR TO YOUR ARRIVAL TIME.  Do not smoke or drink alcoholic beverages 24 hours prior to your procedure.  Shower the night before AND the morning of your procedure with a Chlorhexidine wash such as Hibiclens or Dial antibacterial soap from the neck down.  Do not get it on your face or in your eyes.  You may use your own shampoo and face wash.  This helps your skin to be as bacteria free as possible.    If you wear contact lenses, dentures, hearing aids or glasses, bring a container to put them in during surgery and give to a family member for safe keeping.  Please leave all jewelry, piercing's and valuables at home. You must remove your false eyelashes prior to surgery.    DO NOT remove hair from the surgery site.  Do not shave the incision site unless you are given specific instructions to do so.    ONLY if you have been diagnosed with sleep apnea please bring your C-PAP machine.  ONLY if you wear home oxygen please bring your portable oxygen tank the day of your procedure.  ONLY if you have a history of OPEN HEART SURGERY you will need a clearance from your Cardiologist per Anesthesia.      ONLY for patients requiring bowel prep, written instructions will be given by your doctor's office.  ONLY if you have a neuro stimulator, please bring the controller with you the morning of surgery  ONLY if a type and screen test is needed before surgery, please return:  If your doctor has scheduled you for an overnight stay, bring a small overnight bag with any personal items you need.  Make arrangements in advance for transportation home by a responsible adult. You can not go home in an uber or a cab per hospital policy.  It is not safe to drive a vehicle during the 24 hours after anesthesia.          All  facilities and properties are tobacco free.  Smoking is NOT allowed.   If you have any questions about these instructions, call Pre-Op Admit  Nursing at [phone] or the Pre-Op Day Surgery Unit at [phone].

## 2025-01-15 NOTE — PRE ADMISSION SCREENING
Patient Name: Gloria Manuel  YOB: 1955   MRN: [id number]     [facility]   Basic Mobility Inpatient Short Form 6 Clicks         How much difficulty does the patient currently have  Unable  A Lot  A Little  None      1. Turning over in bed (including adjusting bedclothes, sheets and blankets)?     1 []    2 []    3 [x]    4 []        2. Sitting down on and standing up from a chair with arms (e.g., wheelchair, bedside commode, etc.)     1 []  2 []  3 []     4 [x]      3. Moving from lying on back to sitting on the side of the bed?     1 []  2 []  3 [x]    4 []    How much help from another person does the patient currently need  Total  A Lot  A Little  None      4. Moving to and from a bed to a chair (including a wheelchair)?    1 []  2 []  3 []    4 [x]      5. Need to walk in hospital room?    1 []  2 []  3 []    4 [x]      6. Climbing 3-5 steps with a railing?    1 []  2 []  3 []    4 [x]       Raw Score:      22            CMS 0-100% Score:   20.91         %   Standardized Score:    53.28           CMS Modifier:     CJ                                     [facility]   Basic Mobility Inpatient Short Form 6 Clicks Score Conversion Table*         *Use this form to convert -PAC Basic Mobility Inpatient Raw Scores.   -[facility] Inpatient Basic Mobility Short Form Scoring Example   1. Add the number values associated with the response to each item. For example, items totals yield a Raw Score of 21.   2. Match the raw score to the t-Scale scores (t-Scale score = 50.25, SE = 4.69).   3. Find the associated CMS % (CMS % = 28.97%).   4. Locate the correct CMS Functional Modifier Code, or G Code (G code = CJ)     NOTE: Each -PAC Short Form has a separate conversion table. Make sure that you use the correct conversion table.       Instruction Manual - page 45 contains conversion table

## 2025-01-15 NOTE — PRE ADMISSION SCREENING
JOINT CAMP ASSESSMENT    Name Gloria Manuel   MRN [id number]    Age/Sex 69 y.o. female    Surgeon Dr. Prosper Hodge   Joint Camp Date 1/15/2025   Surgery Date 1/27/2025   Procedure Right Knee Arthroplasty   Insurance Payor: [facility] MANAGED MEDICARE / Plan: [facility] [place] GARCIA [employer] [facility] ADVANTAGE / Product Type: Medicare Advantage /    Care Team Patient Care Team:  Liliana Rangel MD as PCP - General (Family Medicine)  Prosper Hodge MD as Consulting Physician (Orthopedic Surgery)  SHUN Ross MD as Consulting Physician (Endocrinology)  Rebeca Zuniga MD as Consulting Physician (Optometry)  Cat Carrillo WHNP as Nurse Practitioner (Obstetrics and Gynecology)    Pharmacy   [facility] - [place], [place] - [address]  [address]  [place] [address]  Phone: [phone] Fax: [phone]     AM-PAC Score   22   Risk Assessment Score 5     Past Medical History:   Diagnosis Date    Anxiety     Arthritis     Back pain     Cancer     thyroid ca and skin ca    Depression     Femoral acetabular impingement 11/06/2017    GERD (gastroesophageal reflux disease)     Migraines     MVP (mitral valve prolapse)     RLS (restless legs syndrome)     Thyroid disease     thyroidectomy    Wears dentures     UPPER AND LOWER BRIDGE    Wears glasses        Past Surgical History:   Procedure Laterality Date    COLONOSCOPY      COLONOSCOPY N/A 07/19/2022    Procedure: COLONOSCOPY;  Surgeon: Kuldeep Gill MD;  Location: [facility] ENDO;  Service: Endoscopy;  Laterality: N/A;    HIP ARTHROPLASTY Right 06/25/2018    Procedure: ARTHROPLASTY, HIP;  Surgeon: Prosper Hodge MD;  Location: [facility] OR;  Service: Orthopedics;  Laterality: Right;  william    HIP ARTHROPLASTY Left 12/05/2022    Procedure: ARTHROPLASTY, HIP, LEFT;  Surgeon: Prosper Hodge MD;  Location: [facility] OR;  Service: Orthopedics;  Laterality: Left;    HYSTERECTOMY      JOINT REPLACEMENT  June 2017    Right Hip replacement    SACRAL NERVE STIMULATOR  PLACEMENT Left     SKIN CANCER EXCISION      THYROIDECTOMY N/A 03/09/2020    Procedure: TOTAL THYROIDECTOMY;  Surgeon: Kelsie Rodgers MD;  Location: [facility];  Service: General;  Laterality: N/A;    TONSILLECTOMY           Home Enviroment     Living Arrangement: Lives with spouse  Home Environment: 1-story house/ trailer, walk-in shower  Home Safety Concerns: unremarkable    DISCHARGE CAREGIVER/SUPPORT SYSTEM     Identified post-op caregiver: Patient has spouse / significant other.  Patient's caregiver(s) will be able to provide physical assistance. Patient will have someone to assist overnight.      Caregiver present at pre-op interview:  No      PRE-OPERATIVE FUNCTIONAL STATUS     Employment: Employed full time    Pre-op Functional Status: Patient is independent with mobility/ambulation, transfers, ADL's, IADL's.    Use of assistive device for ambulation: none  ADL: self care  ADL Limitations: difficulty with walking  Medical Restrictions: Unstable ambulation and Decreased range of motions in extremities    POTENTIAL BARRIERS TO DISCHARGE/POTENTIAL POST-OP COMPLICATIONS     Patient with hx of RLS. Patient had left hip arthroplasty on 12/05/2022 without complication. Patient had right hip arthroplasty on 06/25/2018 without complication. POSSIBLE SAME DAY DISCHARGE.    DISCHARGE PLAN     Expected LOS of 1 days or less for joint replacement discussed with patient. We also discussed a discharge path of HH for approximately two weeks with a transition to outpatient PT on the third week given no post-op complications.      Patient in agreement with discharge plan: Yes    Discharge to: Discharge home with home health (PT/OT) x2 weeks with transition to outpatient PT     HH:  [facility] ([place]). Patient disclosure form completed and sent to case management for upload to the medical record.      OP PT: [facility] ([place]).     Home DME: rolling walker, bedside commode, and assistive device  kit    Needed DME at D/C: none     Rx: Per Dr. Hodge at discharge     Meds to Beds: Yes  Patient expected to discharge on Aspirin 81mg by mouth twice daily for DVT prophylaxis.

## 2025-01-16 DIAGNOSIS — M17.11 PRIMARY OSTEOARTHRITIS OF RIGHT KNEE: ICD-10-CM

## 2025-01-16 DIAGNOSIS — Z01.818 PREOP TESTING: Primary | ICD-10-CM

## 2025-01-20 LAB
OHS QRS DURATION: 86 MS
OHS QTC CALCULATION: 452 MS

## 2025-01-23 RX ORDER — ASPIRIN 81 MG/1
81 TABLET ORAL EVERY 12 HOURS
Qty: 60 TABLET | Refills: 0 | Status: SHIPPED | OUTPATIENT
Start: 2025-01-23 | End: 2025-02-27

## 2025-01-23 RX ORDER — CELECOXIB 200 MG/1
200 CAPSULE ORAL 2 TIMES DAILY
Qty: 60 CAPSULE | Refills: 0 | Status: SHIPPED | OUTPATIENT
Start: 2025-01-23 | End: 2025-02-27

## 2025-01-23 RX ORDER — OXYCODONE AND ACETAMINOPHEN 7.5; 325 MG/1; MG/1
1 TABLET ORAL EVERY 6 HOURS PRN
Qty: 28 TABLET | Refills: 0 | Status: SHIPPED | OUTPATIENT
Start: 2025-01-23 | End: 2025-01-28

## 2025-01-23 RX ORDER — DOCUSATE SODIUM 100 MG/1
100 CAPSULE, LIQUID FILLED ORAL 2 TIMES DAILY
Qty: 60 CAPSULE | Refills: 0 | Status: SHIPPED | OUTPATIENT
Start: 2025-01-23 | End: 2025-02-27

## 2025-01-23 RX ORDER — ONDANSETRON 4 MG/1
4 TABLET, FILM COATED ORAL EVERY 6 HOURS PRN
Qty: 10 TABLET | Refills: 0 | Status: SHIPPED | OUTPATIENT
Start: 2025-01-23

## 2025-01-23 RX ORDER — GABAPENTIN 300 MG/1
300 CAPSULE ORAL 2 TIMES DAILY
Qty: 60 CAPSULE | Refills: 0 | Status: SHIPPED | OUTPATIENT
Start: 2025-01-23 | End: 2025-02-27

## 2025-01-24 ENCOUNTER — ANESTHESIA EVENT (OUTPATIENT)
Dept: SURGERY | Facility: HOSPITAL | Age: 70
End: 2025-01-24
Payer: MEDICARE

## 2025-01-24 ENCOUNTER — LAB VISIT (OUTPATIENT)
Dept: LAB | Facility: HOSPITAL | Age: 70
End: 2025-01-24
Attending: ORTHOPAEDIC SURGERY
Payer: MEDICARE

## 2025-01-24 DIAGNOSIS — Z01.818 PREOP TESTING: ICD-10-CM

## 2025-01-24 DIAGNOSIS — M17.11 PRIMARY OSTEOARTHRITIS OF RIGHT KNEE: Primary | ICD-10-CM

## 2025-01-24 LAB
ANION GAP SERPL CALC-SCNC: 12 MMOL/L (ref 8–16)
BUN SERPL-MCNC: 9 MG/DL (ref 8–23)
CALCIUM SERPL-MCNC: 10.1 MG/DL (ref 8.7–10.5)
CHLORIDE SERPL-SCNC: 103 MMOL/L (ref 95–110)
CO2 SERPL-SCNC: 28 MMOL/L (ref 23–29)
CREAT SERPL-MCNC: 0.8 MG/DL (ref 0.5–1.4)
EST. GFR  (NO RACE VARIABLE): >60 ML/MIN/1.73 M^2
GLUCOSE SERPL-MCNC: 99 MG/DL (ref 70–110)
POTASSIUM SERPL-SCNC: 4.8 MMOL/L (ref 3.5–5.1)
SODIUM SERPL-SCNC: 143 MMOL/L (ref 136–145)

## 2025-01-24 PROCEDURE — 36415 COLL VENOUS BLD VENIPUNCTURE: CPT | Performed by: ORTHOPAEDIC SURGERY

## 2025-01-24 PROCEDURE — 80048 BASIC METABOLIC PNL TOTAL CA: CPT | Performed by: ORTHOPAEDIC SURGERY

## 2025-01-27 ENCOUNTER — HOSPITAL ENCOUNTER (OUTPATIENT)
Facility: HOSPITAL | Age: 70
Discharge: HOME | End: 2025-01-27
Attending: ORTHOPAEDIC SURGERY | Admitting: ORTHOPAEDIC SURGERY
Payer: MEDICARE

## 2025-01-27 ENCOUNTER — ANESTHESIA (OUTPATIENT)
Dept: SURGERY | Facility: HOSPITAL | Age: 70
End: 2025-01-27
Payer: MEDICARE

## 2025-01-27 DIAGNOSIS — Z01.818 PRE-OP TESTING: ICD-10-CM

## 2025-01-27 DIAGNOSIS — M17.11 PRIMARY OSTEOARTHRITIS OF RIGHT KNEE: Primary | ICD-10-CM

## 2025-01-27 PROCEDURE — 63600175 PHARM REV CODE 636 W HCPCS: Mod: JZ,TB | Performed by: ANESTHESIOLOGY

## 2025-01-27 PROCEDURE — 97161 PT EVAL LOW COMPLEX 20 MIN: CPT

## 2025-01-27 PROCEDURE — 64447 NJX AA&/STRD FEMORAL NRV IMG: CPT | Performed by: ANESTHESIOLOGY

## 2025-01-27 PROCEDURE — 25000003 PHARM REV CODE 250: Performed by: NURSE ANESTHETIST, CERTIFIED REGISTERED

## 2025-01-27 PROCEDURE — 94799 UNLISTED PULMONARY SVC/PX: CPT

## 2025-01-27 PROCEDURE — 37000009 HC ANESTHESIA EA ADD 15 MINS: Performed by: ORTHOPAEDIC SURGERY

## 2025-01-27 PROCEDURE — C1713 ANCHOR/SCREW BN/BN,TIS/BN: HCPCS | Performed by: ORTHOPAEDIC SURGERY

## 2025-01-27 PROCEDURE — 36000712 HC OR TIME LEV V 1ST 15 MIN: Performed by: ORTHOPAEDIC SURGERY

## 2025-01-27 PROCEDURE — 25000003 PHARM REV CODE 250: Performed by: ANESTHESIOLOGY

## 2025-01-27 PROCEDURE — 71000016 HC POSTOP RECOV ADDL HR: Performed by: ORTHOPAEDIC SURGERY

## 2025-01-27 PROCEDURE — 36000713 HC OR TIME LEV V EA ADD 15 MIN: Performed by: ORTHOPAEDIC SURGERY

## 2025-01-27 PROCEDURE — 71000039 HC RECOVERY, EACH ADD'L HOUR: Performed by: ORTHOPAEDIC SURGERY

## 2025-01-27 PROCEDURE — 27201423 OPTIME MED/SURG SUP & DEVICES STERILE SUPPLY: Performed by: ORTHOPAEDIC SURGERY

## 2025-01-27 PROCEDURE — 63600175 PHARM REV CODE 636 W HCPCS: Performed by: ORTHOPAEDIC SURGERY

## 2025-01-27 PROCEDURE — 63600175 PHARM REV CODE 636 W HCPCS: Performed by: ANESTHESIOLOGY

## 2025-01-27 PROCEDURE — 71000015 HC POSTOP RECOV 1ST HR: Performed by: ORTHOPAEDIC SURGERY

## 2025-01-27 PROCEDURE — 63600175 PHARM REV CODE 636 W HCPCS: Performed by: NURSE ANESTHETIST, CERTIFIED REGISTERED

## 2025-01-27 PROCEDURE — 63600175 PHARM REV CODE 636 W HCPCS

## 2025-01-27 PROCEDURE — 63600175 PHARM REV CODE 636 W HCPCS: Mod: JZ,TB | Performed by: ORTHOPAEDIC SURGERY

## 2025-01-27 PROCEDURE — 97116 GAIT TRAINING THERAPY: CPT

## 2025-01-27 PROCEDURE — 71000033 HC RECOVERY, INTIAL HOUR: Performed by: ORTHOPAEDIC SURGERY

## 2025-01-27 PROCEDURE — 27200665 HC NERVE BLOCK NEEDLE/ CATHETER: Performed by: ANESTHESIOLOGY

## 2025-01-27 PROCEDURE — C1769 GUIDE WIRE: HCPCS | Performed by: ORTHOPAEDIC SURGERY

## 2025-01-27 PROCEDURE — 37000008 HC ANESTHESIA 1ST 15 MINUTES: Performed by: ORTHOPAEDIC SURGERY

## 2025-01-27 PROCEDURE — 27447 TOTAL KNEE ARTHROPLASTY: CPT | Mod: RT,,, | Performed by: ORTHOPAEDIC SURGERY

## 2025-01-27 PROCEDURE — C1776 JOINT DEVICE (IMPLANTABLE): HCPCS | Performed by: ORTHOPAEDIC SURGERY

## 2025-01-27 PROCEDURE — 63600175 PHARM REV CODE 636 W HCPCS: Mod: TB | Performed by: ANESTHESIOLOGY

## 2025-01-27 PROCEDURE — 25000003 PHARM REV CODE 250: Performed by: ORTHOPAEDIC SURGERY

## 2025-01-27 PROCEDURE — 0055T BONE SRGRY CMPTR CT/MRI IMAG: CPT | Mod: ,,, | Performed by: ORTHOPAEDIC SURGERY

## 2025-01-27 DEVICE — CRUCIATE RETAINING FEMORAL
Type: IMPLANTABLE DEVICE | Site: KNEE | Status: FUNCTIONAL
Brand: TRIATHLON

## 2025-01-27 DEVICE — SIMPLEX® HV IS A FAST-SETTING ACRYLIC RESIN FOR USE IN BONE SURGERY. MIXING THE TWO SEPARATE STERILE COMPONENTS PRODUCES A DUCTILE BONE CEMENT WHICH, AFTER HARDENING, FIXES THE IMPLANT AND TRANSFERS STRESSES PRODUCED DURING MOVEMENT EVENLY TO THE BONE. SIMPLEX® HV CEMENT POWDER ALSO CONTAINS INSOLUBLE ZIRCONIUM DIOXIDE AS AN X-RAY CONTRAST MEDIUM. SIMPLEX® HV DOES NOT EMIT A SIGNAL AND DOES NOT POSE A SAFETY RISK IN A MAGNETIC RESONANCE ENVIRONMENT.
Type: IMPLANTABLE DEVICE | Site: KNEE | Status: FUNCTIONAL
Brand: SIMPLEX HV

## 2025-01-27 DEVICE — TIBIAL BEARING INSERT - CS
Type: IMPLANTABLE DEVICE | Site: KNEE | Status: FUNCTIONAL
Brand: TRIATHLON

## 2025-01-27 DEVICE — SYMMETRIC PATELLA
Type: IMPLANTABLE DEVICE | Site: KNEE | Status: FUNCTIONAL
Brand: TRIATHLON

## 2025-01-27 DEVICE — PRIMARY TIBIAL BASEPLATE
Type: IMPLANTABLE DEVICE | Site: KNEE | Status: FUNCTIONAL
Brand: TRIATHLON

## 2025-01-27 RX ORDER — MIDAZOLAM HYDROCHLORIDE 1 MG/ML
2 INJECTION, SOLUTION INTRAMUSCULAR; INTRAVENOUS
Status: DISCONTINUED | OUTPATIENT
Start: 2025-01-27 | End: 2025-01-27 | Stop reason: HOSPADM

## 2025-01-27 RX ORDER — DEXAMETHASONE SODIUM PHOSPHATE 4 MG/ML
INJECTION, SOLUTION INTRA-ARTICULAR; INTRALESIONAL; INTRAMUSCULAR; INTRAVENOUS; SOFT TISSUE
Status: DISCONTINUED | OUTPATIENT
Start: 2025-01-27 | End: 2025-01-27

## 2025-01-27 RX ORDER — DEXTROSE MONOHYDRATE AND SODIUM CHLORIDE 5; .45 G/100ML; G/100ML
INJECTION, SOLUTION INTRAVENOUS CONTINUOUS
Status: CANCELLED | OUTPATIENT
Start: 2025-01-27

## 2025-01-27 RX ORDER — BISACODYL 10 MG/1
10 SUPPOSITORY RECTAL DAILY
Status: CANCELLED | OUTPATIENT
Start: 2025-01-28

## 2025-01-27 RX ORDER — SODIUM CHLORIDE 0.9 % (FLUSH) 0.9 %
5 SYRINGE (ML) INJECTION
Status: DISCONTINUED | OUTPATIENT
Start: 2025-01-27 | End: 2025-01-27 | Stop reason: HOSPADM

## 2025-01-27 RX ORDER — ACETAMINOPHEN 500 MG
1000 TABLET ORAL
Status: COMPLETED | OUTPATIENT
Start: 2025-01-27 | End: 2025-01-27

## 2025-01-27 RX ORDER — CELECOXIB 100 MG/1
200 CAPSULE ORAL 2 TIMES DAILY
Status: CANCELLED | OUTPATIENT
Start: 2025-01-27

## 2025-01-27 RX ORDER — OXYCODONE HYDROCHLORIDE 5 MG/1
5 TABLET ORAL ONCE AS NEEDED
Status: COMPLETED | OUTPATIENT
Start: 2025-01-27 | End: 2025-01-27

## 2025-01-27 RX ORDER — FAMOTIDINE 20 MG/1
20 TABLET, FILM COATED ORAL 2 TIMES DAILY
Status: CANCELLED | OUTPATIENT
Start: 2025-01-27

## 2025-01-27 RX ORDER — KETOROLAC TROMETHAMINE 30 MG/ML
15 INJECTION, SOLUTION INTRAMUSCULAR; INTRAVENOUS ONCE
Status: COMPLETED | OUTPATIENT
Start: 2025-01-27 | End: 2025-01-27

## 2025-01-27 RX ORDER — CELECOXIB 100 MG/1
400 CAPSULE ORAL
Status: COMPLETED | OUTPATIENT
Start: 2025-01-27 | End: 2025-01-27

## 2025-01-27 RX ORDER — PHENYLEPHRINE HYDROCHLORIDE 10 MG/ML
INJECTION INTRAVENOUS
Status: DISCONTINUED | OUTPATIENT
Start: 2025-01-27 | End: 2025-01-27

## 2025-01-27 RX ORDER — ONDANSETRON 4 MG/1
8 TABLET, ORALLY DISINTEGRATING ORAL EVERY 8 HOURS PRN
Status: CANCELLED | OUTPATIENT
Start: 2025-01-27

## 2025-01-27 RX ORDER — PROPOFOL 10 MG/ML
VIAL (ML) INTRAVENOUS
Status: DISCONTINUED | OUTPATIENT
Start: 2025-01-27 | End: 2025-01-27

## 2025-01-27 RX ORDER — FENTANYL CITRATE 50 UG/ML
25-200 INJECTION, SOLUTION INTRAMUSCULAR; INTRAVENOUS
Status: DISCONTINUED | OUTPATIENT
Start: 2025-01-27 | End: 2025-01-27 | Stop reason: HOSPADM

## 2025-01-27 RX ORDER — OXYCODONE HYDROCHLORIDE 10 MG/1
10 TABLET ORAL EVERY 4 HOURS PRN
Status: CANCELLED | OUTPATIENT
Start: 2025-01-27

## 2025-01-27 RX ORDER — MORPHINE SULFATE 2 MG/ML
2 INJECTION, SOLUTION INTRAMUSCULAR; INTRAVENOUS EVERY 4 HOURS PRN
Status: CANCELLED | OUTPATIENT
Start: 2025-01-27

## 2025-01-27 RX ORDER — HYDROMORPHONE HYDROCHLORIDE 2 MG/ML
INJECTION, SOLUTION INTRAMUSCULAR; INTRAVENOUS; SUBCUTANEOUS
Status: DISCONTINUED | OUTPATIENT
Start: 2025-01-27 | End: 2025-01-27

## 2025-01-27 RX ORDER — LIDOCAINE HYDROCHLORIDE 10 MG/ML
1 INJECTION, SOLUTION EPIDURAL; INFILTRATION; INTRACAUDAL; PERINEURAL ONCE
Status: DISCONTINUED | OUTPATIENT
Start: 2025-01-27 | End: 2025-01-27 | Stop reason: HOSPADM

## 2025-01-27 RX ORDER — FENTANYL CITRATE 50 UG/ML
25 INJECTION, SOLUTION INTRAMUSCULAR; INTRAVENOUS EVERY 5 MIN PRN
Status: DISCONTINUED | OUTPATIENT
Start: 2025-01-27 | End: 2025-01-27 | Stop reason: HOSPADM

## 2025-01-27 RX ORDER — CEFAZOLIN 2 G/1
2 INJECTION, POWDER, FOR SOLUTION INTRAMUSCULAR; INTRAVENOUS
Status: COMPLETED | OUTPATIENT
Start: 2025-01-27 | End: 2025-01-27

## 2025-01-27 RX ORDER — BUPIVACAINE 13.3 MG/ML
INJECTION, SUSPENSION, LIPOSOMAL INFILTRATION
Status: COMPLETED | OUTPATIENT
Start: 2025-01-27 | End: 2025-01-27

## 2025-01-27 RX ORDER — ONDANSETRON HYDROCHLORIDE 2 MG/ML
INJECTION, SOLUTION INTRAMUSCULAR; INTRAVENOUS
Status: DISCONTINUED | OUTPATIENT
Start: 2025-01-27 | End: 2025-01-27

## 2025-01-27 RX ORDER — ZOLPIDEM TARTRATE 5 MG/1
5 TABLET ORAL NIGHTLY PRN
Status: CANCELLED | OUTPATIENT
Start: 2025-01-27

## 2025-01-27 RX ORDER — LIDOCAINE HYDROCHLORIDE 20 MG/ML
INJECTION INTRAVENOUS
Status: DISCONTINUED | OUTPATIENT
Start: 2025-01-27 | End: 2025-01-27

## 2025-01-27 RX ORDER — ONDANSETRON HYDROCHLORIDE 2 MG/ML
4 INJECTION, SOLUTION INTRAVENOUS ONCE AS NEEDED
Status: DISCONTINUED | OUTPATIENT
Start: 2025-01-27 | End: 2025-01-27 | Stop reason: HOSPADM

## 2025-01-27 RX ORDER — BUPIVACAINE HYDROCHLORIDE 5 MG/ML
INJECTION, SOLUTION EPIDURAL; INTRACAUDAL
Status: COMPLETED | OUTPATIENT
Start: 2025-01-27 | End: 2025-01-27

## 2025-01-27 RX ORDER — MIDAZOLAM HYDROCHLORIDE 1 MG/ML
INJECTION INTRAMUSCULAR; INTRAVENOUS
Status: DISCONTINUED | OUTPATIENT
Start: 2025-01-27 | End: 2025-01-27

## 2025-01-27 RX ORDER — TRANEXAMIC ACID 100 MG/ML
INJECTION, SOLUTION INTRAVENOUS
Status: DISCONTINUED | OUTPATIENT
Start: 2025-01-27 | End: 2025-01-27

## 2025-01-27 RX ORDER — POLYETHYLENE GLYCOL 3350 17 G/17G
17 POWDER, FOR SOLUTION ORAL DAILY
Status: CANCELLED | OUTPATIENT
Start: 2025-01-28

## 2025-01-27 RX ORDER — CEFAZOLIN 2 G/1
2 INJECTION, POWDER, FOR SOLUTION INTRAMUSCULAR; INTRAVENOUS
Status: CANCELLED | OUTPATIENT
Start: 2025-01-27 | End: 2025-01-28

## 2025-01-27 RX ORDER — OXYCODONE HCL 10 MG/1
10 TABLET, FILM COATED, EXTENDED RELEASE ORAL ONCE
Status: DISCONTINUED | OUTPATIENT
Start: 2025-01-27 | End: 2025-01-27

## 2025-01-27 RX ORDER — OXYCODONE HYDROCHLORIDE 5 MG/1
5 TABLET ORAL ONCE AS NEEDED
Status: DISCONTINUED | OUTPATIENT
Start: 2025-01-27 | End: 2025-01-27 | Stop reason: SDUPTHER

## 2025-01-27 RX ADMIN — TRANEXAMIC ACID 800 MG: 100 INJECTION, SOLUTION INTRAVENOUS at 05:01

## 2025-01-27 RX ADMIN — FENTANYL CITRATE 25 MCG: 50 INJECTION, SOLUTION INTRAMUSCULAR; INTRAVENOUS at 07:01

## 2025-01-27 RX ADMIN — GLYCOPYRROLATE 0.2 MG: 0.2 INJECTION, SOLUTION INTRAMUSCULAR; INTRAVITREAL at 04:01

## 2025-01-27 RX ADMIN — ACETAMINOPHEN 1000 MG: 500 TABLET ORAL at 02:01

## 2025-01-27 RX ADMIN — FENTANYL CITRATE 50 MCG: 50 INJECTION, SOLUTION INTRAMUSCULAR; INTRAVENOUS at 02:01

## 2025-01-27 RX ADMIN — FENTANYL CITRATE 50 MCG: 50 INJECTION, SOLUTION INTRAMUSCULAR; INTRAVENOUS at 04:01

## 2025-01-27 RX ADMIN — BUPIVACAINE 20 ML: 13.3 INJECTION, SUSPENSION, LIPOSOMAL INFILTRATION at 02:01

## 2025-01-27 RX ADMIN — DEXAMETHASONE SODIUM PHOSPHATE 8 MG: 4 INJECTION, SOLUTION INTRA-ARTICULAR; INTRALESIONAL; INTRAMUSCULAR; INTRAVENOUS; SOFT TISSUE at 04:01

## 2025-01-27 RX ADMIN — LIDOCAINE HYDROCHLORIDE 75 MG: 20 INJECTION, SOLUTION INTRAVENOUS at 04:01

## 2025-01-27 RX ADMIN — HYDROMORPHONE HYDROCHLORIDE 0.5 MG: 2 INJECTION INTRAMUSCULAR; INTRAVENOUS; SUBCUTANEOUS at 05:01

## 2025-01-27 RX ADMIN — FENTANYL CITRATE 75 MCG: 50 INJECTION, SOLUTION INTRAMUSCULAR; INTRAVENOUS at 05:01

## 2025-01-27 RX ADMIN — SODIUM CHLORIDE, SODIUM GLUCONATE, SODIUM ACETATE, POTASSIUM CHLORIDE AND MAGNESIUM CHLORIDE: 526; 502; 368; 37; 30 INJECTION, SOLUTION INTRAVENOUS at 02:01

## 2025-01-27 RX ADMIN — OXYCODONE 5 MG: 5 TABLET ORAL at 07:01

## 2025-01-27 RX ADMIN — MIDAZOLAM HYDROCHLORIDE 2 MG: 1 INJECTION, SOLUTION INTRAMUSCULAR; INTRAVENOUS at 02:01

## 2025-01-27 RX ADMIN — PROPOFOL 150 MG: 10 INJECTION, EMULSION INTRAVENOUS at 04:01

## 2025-01-27 RX ADMIN — CEFAZOLIN 2 G: 2 INJECTION, POWDER, FOR SOLUTION INTRAMUSCULAR; INTRAVENOUS at 04:01

## 2025-01-27 RX ADMIN — SODIUM CHLORIDE, SODIUM GLUCONATE, SODIUM ACETATE, POTASSIUM CHLORIDE AND MAGNESIUM CHLORIDE: 526; 502; 368; 37; 30 INJECTION, SOLUTION INTRAVENOUS at 07:01

## 2025-01-27 RX ADMIN — KETOROLAC TROMETHAMINE 15 MG: 30 INJECTION, SOLUTION INTRAMUSCULAR at 07:01

## 2025-01-27 RX ADMIN — PHENYLEPHRINE HYDROCHLORIDE 100 MCG: 10 INJECTION INTRAVENOUS at 04:01

## 2025-01-27 RX ADMIN — FENTANYL CITRATE 25 MCG: 50 INJECTION, SOLUTION INTRAMUSCULAR; INTRAVENOUS at 05:01

## 2025-01-27 RX ADMIN — ROPIVACAINE HYDROCHLORIDE: 5 INJECTION EPIDURAL; INFILTRATION; PERINEURAL at 05:01

## 2025-01-27 RX ADMIN — HYDROMORPHONE HYDROCHLORIDE 1 MG: 2 INJECTION INTRAMUSCULAR; INTRAVENOUS; SUBCUTANEOUS at 06:01

## 2025-01-27 RX ADMIN — CELECOXIB 400 MG: 100 CAPSULE ORAL at 02:01

## 2025-01-27 RX ADMIN — BUPIVACAINE HYDROCHLORIDE 10 ML: 5 INJECTION, SOLUTION EPIDURAL; INTRACAUDAL; PERINEURAL at 02:01

## 2025-01-27 RX ADMIN — ONDANSETRON 4 MG: 2 INJECTION INTRAMUSCULAR; INTRAVENOUS at 04:01

## 2025-01-27 NOTE — CARE UPDATE
01/27/25 1435   Patient Assessment/Suction   Level of Consciousness (AVPU) alert   PRE-TX-O2   Device (Oxygen Therapy) room air   Incentive Spirometer   $ Incentive Spirometer Charges done with encouragement   Incentive Spirometer Predicted Level (mL) 1680   Administration (IS) proper technique demonstrated   Number of Repetitions (IS) 2   Level Incentive Spirometer (mL) 3000   Patient Tolerance (IS) good

## 2025-01-27 NOTE — ANESTHESIA PROCEDURE NOTES
Peripheral Block    Patient location during procedure: pre-op   Block not for primary anesthetic.  Reason for block: at surgeon's request and post-op pain management   Post-op Pain Location: right knee   Start time: 1/27/2025 2:55 PM  Timeout: 1/27/2025 2:50 PM   End time: 1/27/2025 3:00 PM    Staffing  Authorizing Provider: Adair Angel MD  Performing Provider: Adair Angel MD    Staffing  Performed by: Adair Angel MD  Authorized by: Adair Angel MD    Preanesthetic Checklist  Completed: patient identified, IV checked, site marked, risks and benefits discussed, surgical consent, monitors and equipment checked, pre-op evaluation and timeout performed  Peripheral Block  Patient position: supine  Prep: ChloraPrep  Patient monitoring: heart rate, cardiac monitor, continuous pulse ox, continuous capnometry and frequent blood pressure checks  Block type: adductor canal  Laterality: right  Injection technique: single shot  Needle  Needle type: Stimuplex   Needle gauge: 21 G  Needle length: 4 in  Needle localization: anatomical landmarks and ultrasound guidance   -ultrasound image captured on disc.  Assessment  Injection assessment: negative aspiration, negative parasthesia and local visualized surrounding nerve  Paresthesia pain: none  Heart rate change: no  Slow fractionated injection: yes    Medications:    Medications: BUPivacaine liposome (PF) 1.3 % (13.3 mg/mL) suspension - Injection   20 mL - 1/27/2025 2:55:00 PM  bupivacaine (pf) (MARCAINE) injection 0.5% - Perineural   10 mL - 1/27/2025 2:55:00 PM    Additional Notes  VSS.  [facility] RN monitoring vitals throughout procedure.  Patient tolerated procedure well.

## 2025-01-27 NOTE — H&P
CC/Indication for Procedure: 69 y.o. female with Primary osteoarthritis of right knee [M17.11]  Pre-op testing [Z01.818].    Patient scheduled for HI TOTAL KNEE ARTHROPLASTY [91818] (ROBOTIC ARTHROPLASTY, KNEE, TOTAL, RIGHT).    Past Medical History:   Diagnosis Date    Anxiety     Arthritis     Back pain     Cancer     thyroid ca and skin ca    Depression     Femoral acetabular impingement 11/06/2017    GERD (gastroesophageal reflux disease)     Migraines     MVP (mitral valve prolapse)     RLS (restless legs syndrome)     Thyroid disease     thyroidectomy    Wears dentures     UPPER AND LOWER BRIDGE    Wears glasses      Past Surgical History:   Procedure Laterality Date    COLONOSCOPY      COLONOSCOPY N/A 07/19/2022    Procedure: COLONOSCOPY;  Surgeon: Kuldeep Gill MD;  Location: [facility];  Service: Endoscopy;  Laterality: N/A;    HIP ARTHROPLASTY Right 06/25/2018    Procedure: ARTHROPLASTY, HIP;  Surgeon: Prosper Hodge MD;  Location: [facility] OR;  Service: Orthopedics;  Laterality: Right;  william    HIP ARTHROPLASTY Left 12/05/2022    Procedure: ARTHROPLASTY, HIP, LEFT;  Surgeon: Prosper Hodge MD;  Location: [facility] OR;  Service: Orthopedics;  Laterality: Left;    HYSTERECTOMY      JOINT REPLACEMENT  June 2017    Right Hip replacement    SACRAL NERVE STIMULATOR PLACEMENT Left     SKIN CANCER EXCISION      THYROIDECTOMY N/A 03/09/2020    Procedure: TOTAL THYROIDECTOMY;  Surgeon: Kelsie Rodgers MD;  Location: [facility] OR;  Service: General;  Laterality: N/A;    TONSILLECTOMY       Family History   Problem Relation Name Age of Onset    Diabetes Father Juan Jose         late onset    Glaucoma Father Juan Jose     Diabetes Other          nephew    Colon cancer Neg Hx      Breast cancer Neg Hx      Colon polyps Neg Hx       Social History     Socioeconomic History    Marital status:     Number of children: 1   Tobacco Use    Smoking status: Former     Current packs/day: 0.00     Average packs/day: 1 pack/day for 13.0  years (13.0 ttl pk-yrs)     Types: Cigarettes     Start date: 1996     Quit date: 2009     Years since quittin.0    Smokeless tobacco: Never    Tobacco comments:     I was in high school when I started smoking - have no idea month and day, year was    Substance and Sexual Activity    Alcohol use: Yes     Comment: I drink occasionally - not every week/day.    Drug use: No    Sexual activity: Yes     Partners: Male     Birth control/protection: Post-menopausal, See Surgical Hx     Social Drivers of Health     Financial Resource Strain: Low Risk  (2023)    Overall Financial Resource Strain (CARDIA)     Difficulty of Paying Living Expenses: Not very hard   Food Insecurity: No Food Insecurity (2023)    Hunger Vital Sign     Worried About Running Out of Food in the Last Year: Never true     Ran Out of Food in the Last Year: Never true   Transportation Needs: No Transportation Needs (2023)    PRAPARE - Transportation     Lack of Transportation (Medical): No     Lack of Transportation (Non-Medical): No   Physical Activity: Unknown (2023)    Exercise Vital Sign     Days of Exercise per Week: 0 days   Stress: Stress Concern Present (2023)    [employer] [place] [employer] - Occupational Stress Questionnaire     Feeling of Stress : To some extent   Housing Stability: Low Risk  (2023)    Housing Stability Vital Sign     Unable to Pay for Housing in the Last Year: No     Number of Places Lived in the Last Year: 1     Unstable Housing in the Last Year: No       Review of patient's allergies indicates:   Allergen Reactions    Morphine Swelling     NECK SWELLED    Demerol (pf) [meperidine (pf)] Other (See Comments)     HEART RACES, BOUNCES OFF WALL    Erythromycin Itching    Oxycodone-acetaminophen Itching       No current facility-administered medications for this encounter.    Facility-Administered Medications Ordered in Other Encounters:     electrolyte-S  (ISOLYTE), , Intravenous, Continuous, Lukasz Hannon MD    electrolyte-S (ISOLYTE), , Intravenous, Continuous, Lukasz Hannon MD, Stopped at 12/05/22 1240    fentaNYL 50 mcg/mL injection 25 mcg, 25 mcg, Intravenous, Q5 Min PRN, Lukasz Hannon MD, 25 mcg at 12/05/22 1335    LIDOcaine (PF) 10 mg/ml (1%) injection 10 mg, 1 mL, Intradermal, Once, Lukasz Hannon MD    triamcinolone acetonide injection 40 mg, 40 mg, Intra-articular, , Finger, MD Prosper, 40 mg at 05/03/22 1300    ROS:    Denies chest pain or palpitations  Denies shortness of breath  Denies fevers or chills  Denies chest pain  Denies abdominal pain    PE:    General Appearance: Well nourished  Orientation: Oriented to time, place, person  Mental Status: Alert  Heart: RRR  Lungs: CTA  Abdomen: Soft and non-tender    Anesthesia/Surgery risks, benefits and alternative options discussed and understood by patient/family.    This note was created using Dragon voice recognition software that occasionally misinterpreted phrases or words.

## 2025-01-27 NOTE — PLAN OF CARE
Pt prepared for surgery. Pt resting in bed, with family/friend at bedside. Family signed up for text messaging. Belongings brought over to post op cabinet. IS teaching performed. Fall risk agreement reviewed and armband placed. Allergy armband placed. SCDs and teds on.

## 2025-01-27 NOTE — OP NOTE
[facility]  Surgery Department  Operative Note    SUMMARY     Date of Procedure: 1/27/2025     Procedure:  Right robotic total knee arthroplasty    Surgeons and Role:     * Prosper Hodge MD - Primary    Assisting Surgeon:  Tanika    Pre-Operative Diagnosis: Primary osteoarthritis of right knee [M17.11]  Pre-op testing [Z01.818]    Post-Operative Diagnosis: Post-Op Diagnosis Codes:     * Primary osteoarthritis of right knee [M17.11]     * Pre-op testing [Z01.818]    Anesthesia: General    Intraoperative Findings:  Advanced medial compartment arthritis with patellofemoral arthritis and varus deformity    Description of the Findings of the Procedure: Patient was placed on the operative table in the supine position.  The  knee was prepped and draped in the usual sterile manner for surgery.  An incision was made over the anterior aspect of the knee.  It was carried down sharply through the skin.  The medial parapatellar tissues were divided and the patella was taken laterally.  The effusion was aspirated.  The medial tibial plateau was taken down.  Two pins were placed just medial to the tibial tubercle for the proximal tibial array.  The tibial array was assembled.  We now placed the tibial checkpoint just medial to the tibial tubercle.  The knee was flexed to 90°.  Two pins were placed into the distal femur for the femoral array and the femoral array was assembled.  A femoral checkpoint was placed.  We now connected to the robotic system and determined the center of rotation of the hip.  The medial and lateral malleoli were identified.  The tibial and femoral check points were verified.  We now verified the femur and the tibia.  When this was completed the knee was brought into extension and the extension gap was determined and captured.  Similarly the flexion gap was determined and captured.  A robotic plan was created to balance the flexion-extension gap.  The plan was accepted.  When  this was completed the robotic arm was brought into position and the femur and the blade were both verified.  The cutting device was utilized and the femoral cuts were made.  Similarly the tibia was verified as was the tibial cutting blade and the tibial cut was made.  We now placed a femoral trial and the tibial trial.  We had full extension full flexion and completely balanced flexion-extension gaps.  This was determined both clinically and using the robotic measurements.  We now broached the tibia.  The patella was calibrated and cut and a button was placed.  The construct trialed perfectly with no lift-off.  We pulsed and irrigated.  We mixed bone cement and cemented 1st the tibia, next the femur and finally the patella.  All excess cement was removed at the time that we cemented and the construct was held in full extension until all the cement completely hardened.  We copiously irrigated again.  We closed the extensor mechanism with a combination of 2. FiberWire and a running Quill stitch.  We irrigated again and closed the subcu with 2-0 Stratafix.  We closed the skin with 4-0 Monocryl.  Sterile dressings were applied and the patient was noted to be in stable condition pending an x-ray and a neurovascular check.    Complications: No    Estimated Blood Loss (EBL): * No values recorded between 1/27/2025  5:20 PM and 1/27/2025  5:54 PM *           Implants:   Implant Name Type Inv. Item Serial No.  Lot No. LRB No. Used Action   CEMENT BONE SIMPLEX HV RADPQ - [id number]  CEMENT BONE SIMPLEX HV RADPQ  [employer]. [id number] Right 1 Implanted   CEMENT BONE SIMPLEX HV RADPQ - [id number]  CEMENT BONE SIMPLEX HV RADPQ  [employer]. [id number] Right 1 Implanted   PIN FIXATION BONE 140X3.2MM - [id number]  PIN FIXATION BONE 140X3.2MM  [employer]. [id number] Right 1 Implanted and Explanted   PIN BONE 3.4N554OV - [id number]  PIN BONE 3.8H603LE  Verious.  [id number] Right 1 Implanted and Explanted   FEMORAL CEMENTED #4 RIGHT - [id number]  FEMORAL CEMENTED #4 RIGHT  [employer]. [id number] Right 1 Implanted   BASEPLATE TIB VIRGINIE PRIM SZ 3 - [id number]  BASEPLATE TIB VIRGINIE PRIM SZ 3  [employer]. [id number] Right 1 Implanted   INSERT TRIATHLON X3 SZ3 9MM - [id number]  INSERT TRIATHLON X3 SZ3 9MM  [employer]. [id number] Right 1 Implanted   PATELLA TRI 29X8 X3 POLYETHYLE - [id number]  PATELLA TRI 29X8 X3 POLYETHYLE  [employer]. [id number] Right 1 Implanted       Specimens:   Specimen (24h ago, onward)      None                    Condition: Good    Disposition: PACU - hemodynamically stable.              Discharge Note    SUMMARY     Admit Date: 1/27/2025    Discharge Date and Time:  01/27/2025 5:54 PM    Hospital Course (synopsis of major diagnoses, care, treatment, and services provided during the course of the hospital stay): Uneventful      Final Diagnosis: Post-Op Diagnosis Codes:     * Primary osteoarthritis of right knee [M17.11]     * Pre-op testing [Z01.818]    Disposition: Home or Self Care    Follow Up/Patient Instructions:     Medications:  Reconciled Home Medications:   Current Discharge Medication List        CONTINUE these medications which have NOT CHANGED    Details   atorvastatin (LIPITOR) 40 MG tablet Take 1 tablet (40 mg total) by mouth every evening.  Qty: 90 tablet, Refills: 1    Associated Diagnoses: Mixed hyperlipidemia      CALCIUM ACETATE ORAL Take by mouth Daily.      ergocalciferol (ERGOCALCIFEROL) 50,000 unit Cap Take 10,000 Units by mouth once daily.      esomeprazole magnesium (NEXIUM ORAL) 40 mg Daily.      estradioL (ESTRACE) 0.01 % (0.1 mg/gram) vaginal cream 1g per vagina nightly x 2 weeks, then 2-3 times per week  Qty: 42.5 g, Refills: 3    Associated Diagnoses: Vaginal atrophy      rOPINIRole (REQUIP) 2 MG tablet Take 1 tablet by mouth in the evening  Qty: 90 tablet, Refills: 3    Associated Diagnoses: RLS (restless legs  syndrome)      sertraline (ZOLOFT) 100 MG tablet Take 1 tablet (100 mg total) by mouth once daily.  Qty: 90 tablet, Refills: 3    Associated Diagnoses: Moderate episode of recurrent major depressive disorder      !! SYNTHROID 112 mcg tablet Take 112 mcg by mouth before breakfast. Takes 5 days weekly opposite 125 mcg      !! SYNTHROID 125 mcg tablet Take 125 mcg by mouth twice a week. Takes on Monday, Wednesday and Friday      acyclovir (ZOVIRAX) 400 MG tablet Take 1 tablet (400 mg total) by mouth 2 (two) times daily.  Qty: 14 tablet, Refills: 5    Associated Diagnoses: Fever blister      ascorbic acid, vitamin C, (VITAMIN C) 1000 MG tablet Take 1,000 mg by mouth once daily.      aspirin (ECOTRIN) 81 MG EC tablet Take 1 tablet (81 mg total) by mouth every 12 (twelve) hours.  Qty: 60 tablet, Refills: 0    Associated Diagnoses: Primary osteoarthritis of right knee      celecoxib (CELEBREX) 200 MG capsule Take 1 capsule (200 mg total) by mouth 2 (two) times daily.  Qty: 60 capsule, Refills: 0    Associated Diagnoses: Primary osteoarthritis of right knee      ciclopirox 0.77 % Gel SMARTSIG:Topical Morning-Evening      diphenoxylate-atropine 2.5-0.025 mg (LOMOTIL) 2.5-0.025 mg per tablet Take 1 tablet by mouth 4 (four) times daily as needed.      docusate sodium (COLACE) 100 MG capsule Take 1 capsule (100 mg total) by mouth 2 (two) times daily.  Qty: 60 capsule, Refills: 0    Associated Diagnoses: Primary osteoarthritis of right knee      gabapentin (NEURONTIN) 300 MG capsule Take 1 capsule (300 mg total) by mouth 2 (two) times daily.  Qty: 60 capsule, Refills: 0    Associated Diagnoses: Primary osteoarthritis of right knee      ondansetron (ZOFRAN) 4 MG tablet Take 1 tablet (4 mg total) by mouth every 6 (six) hours as needed for Nausea.  Qty: 10 tablet, Refills: 0    Associated Diagnoses: Primary osteoarthritis of right knee      oxyCODONE-acetaminophen (PERCOCET) 7.5-325 mg per tablet Take 1 tablet by mouth every 6  (six) hours as needed for Pain.  Qty: 28 tablet, Refills: 0    Comments: This prescription and the attached prescriptions are for postoperative use for the patient's scheduled total joint arthroplasty on Monday January 27, 2025.  This is for the meds to bed program.  Associated Diagnoses: Primary osteoarthritis of right knee       !! - Potential duplicate medications found. Please discuss with provider.        Discharge Procedure Orders   Diet general     Call MD for:  temperature >100.4     Call MD for:  persistent nausea and vomiting     Call MD for:  severe uncontrolled pain     Call MD for:  difficulty breathing, headache or visual disturbances     Call MD for:  redness, tenderness, or signs of infection (pain, swelling, redness, odor or green/yellow discharge around incision site)     Call MD for:  hives     Call MD for:  persistent dizziness or light-headedness     Call MD for:  extreme fatigue      Follow-up Information       [facility]. Call in 1 day(s).    Specialties: Physical Therapy, Home Health Services  Why: As needed  Contact information:  [address] [place] [address]  [address]  [place] [address] [phone]

## 2025-01-27 NOTE — ANESTHESIA PREPROCEDURE EVALUATION
01/27/2025  Gloria Manuel is a 69 y.o., female.      Pre-op Assessment    I have reviewed the Patient Summary Reports.     I have reviewed the Nursing Notes. I have reviewed the NPO Status.   I have reviewed the Medications.     Review of Systems  Anesthesia Hx:  No problems with previous Anesthesia                Social:  Former Smoker       Hematology/Oncology:                        --  Cancer in past history:                 Oncology Comments: Thyroid     Cardiovascular:                hyperlipidemia                               Pulmonary:   COPD         Chronic Obstructive Pulmonary Disease (COPD):                      Hepatic/GI:     GERD         Gerd          Musculoskeletal:  Arthritis   S/P lumbar fusion  Spinal cord stimulator     Arthritis     Spine Disorders: lumbar            Neurological:      Headaches      Dx of Headaches     Chronic Pain Syndrome Arthritis                           Endocrine:   Hypothyroidism       Hypothyroidism          Psych:  Psychiatric History  depression                Physical Exam  General: Well nourished    Airway:  Mallampati: II   Mouth Opening: Normal  TM Distance: Normal  Neck ROM: Normal ROM        Anesthesia Plan  Type of Anesthesia, risks & benefits discussed:    Anesthesia Type: Gen ETT  Intra-op Monitoring Plan: Standard ASA Monitors  Post Op Pain Control Plan: multimodal analgesia, IV/PO Opioids PRN and peripheral nerve block  Airway Plan: Video, Post-Induction  Informed Consent: Informed consent signed with the Patient and all parties understand the risks and agree with anesthesia plan.  All questions answered.   ASA Score: 2    Ready For Surgery From Anesthesia Perspective.     .

## 2025-01-27 NOTE — DISCHARGE INSTRUCTIONS
"Discharge Instructions: After Your Surgery/Procedure  Youve just had surgery. During surgery you were given medicine called anesthesia to keep you relaxed and free of pain. After surgery you may have some pain or nausea. This is common. Here are some tips for feeling better and getting well after surgery.     Stay on schedule with your medication.   Going home  Your doctor or nurse will show you how to take care of yourself when you go home. He or she will also answer your questions. Have an adult family member or friend drive you home.      For your safety we recommend these precaution for the first 24 hours after your procedure:  Do not drive or use heavy equipment.  Do not make important decisions or sign legal papers.  Do not drink alcohol.  Have someone stay with you, if needed. He or she can watch for problems and help keep you safe.  Your concentration, balance, coordination, and judgement may be impaired for many hours after anesthesia.  Use caution when ambulating or standing up.     You may feel weak and "washed out" after anesthesia and surgery.      Subtle residual effects of general anesthesia or sedation with regional / local anesthesia can last more than 24 hours.  Rest for the remainder of the day or longer if your Doctor/Surgeon has advised you to do so.  Although you may feel normal within the first 24 hours, your reflexes and mental ability may be impaired without you realizing it.  You may feel dizzy, lightheaded or sleepy for 24 hours or longer.      Be sure to go to all follow-up visits with your doctor. And rest after your surgery for as long as your doctor tells you to.  Coping with pain  If you have pain after surgery, pain medicine will help you feel better. Take it as told, before pain becomes severe. Also, ask your doctor or pharmacist about other ways to control pain. This might be with heat, ice, or relaxation. And follow any other instructions your surgeon or nurse gives you.  Tips " for taking pain medicine  To get the best relief possible, remember these points:  Pain medicines can upset your stomach. Taking them with a little food may help.  Most pain relievers taken by mouth need at least 20 to 30 minutes to start to work.  Taking medicine on a schedule can help you remember to take it. Try to time your medicine so that you can take it before starting an activity. This might be before you get dressed, go for a walk, or sit down for dinner.  Constipation is a common side effect of pain medicines. Call your doctor before taking any medicines such as laxatives or stool softeners to help ease constipation. Also ask if you should skip any foods. Drinking lots of fluids and eating foods such as fruits and vegetables that are high in fiber can also help. Remember, do not take laxatives unless your surgeon has prescribed them.  Drinking alcohol and taking pain medicine can cause dizziness and slow your breathing. It can even be deadly. Do not drink alcohol while taking pain medicine.  Pain medicine can make you react more slowly to things. Do not drive or run machinery while taking pain medicine.  Your health care provider may tell you to take acetaminophen to help ease your pain. Ask him or her how much you are supposed to take each day. Acetaminophen or other pain relievers may interact with your prescription medicines or other over-the-counter (OTC) drugs. Some prescription medicines have acetaminophen and other ingredients. Using both prescription and OTC acetaminophen for pain can cause you to overdose. Read the labels on your OTC medicines with care. This will help you to clearly know the list of ingredients, how much to take, and any warnings. It may also help you not take too much acetaminophen. If you have questions or do not understand the information, ask your pharmacist or health care provider to explain it to you before you take the OTC medicine.  Managing nausea  Some people have an  upset stomach after surgery. This is often because of anesthesia, pain, or pain medicine, or the stress of surgery. These tips will help you handle nausea and eat healthy foods as you get better. If you were on a special food plan before surgery, ask your doctor if you should follow it while you get better. These tips may help:  Do not push yourself to eat. Your body will tell you when to eat and how much.  Start off with clear liquids and soup. They are easier to digest.  Next try semi-solid foods, such as mashed potatoes, applesauce, and gelatin, as you feel ready.  Slowly move to solid foods. Dont eat fatty, rich, or spicy foods at first.  Do not force yourself to have 3 large meals a day. Instead eat smaller amounts more often.  Take pain medicines with a small amount of solid food, such as crackers or toast, to avoid nausea.     Call your surgeon if  You still have pain an hour after taking medicine. The medicine may not be strong enough.  You feel too sleepy, dizzy, or groggy. The medicine may be too strong.  You have side effects like nausea, vomiting, or skin changes, such as rash, itching, or hives.       If you have obstructive sleep apnea  You were given anesthesia medicine during surgery to keep you comfortable and free of pain. After surgery, you may have more apnea spells because of this medicine and other medicines you were given. The spells may last longer than usual.   At home:  Keep using the continuous positive airway pressure (CPAP) device when you sleep. Unless your health care provider tells you not to, use it when you sleep, day or night. CPAP is a common device used to treat obstructive sleep apnea.  Talk with your provider before taking any pain medicine, muscle relaxants, or sedatives. Your provider will tell you about the possible dangers of taking these medicines.  © 0456-3898 [employer]. [address] [place] [address], [place] [address]. All rights reserved. This information is  not intended as a substitute for professional medical care. Always follow your healthcare professional's instructions.          Using an Incentive Spirometer    An incentive spirometer is a device that helps you do deep breathing exercises. These exercises expand your lungs, aid in circulation, and help prevent pneumonia. Deep breathing exercises also help you breathe better and improve the function of your lungs by:  Keeping your lungs clear  Strengthening your breathing muscles  Helping prevent respiratory complications or problems  The incentive spirometer gives you a way to take an active part in recover. A nurse or therapist will teach you breathing exercises. To do these exercises, you will breathe in through your mouth and not your nose. The incentive spirometer only works correctly if you breathe in through your mouth.  Steps to clear lungs  Step 1. Exhale normally. Then, inhale normally.  Relax and breathe out.  Step 2. Place your lips tightly around the mouthpiece.  Make sure the device is upright and not tilted.  Step 3. Inhale as much air as you can through the mouthpiece (don't breath through your nose).  Inhale slowly and deeply.  Hold your breath long enough to keep the balls or disk raised for at least 3 to 5 seconds, or as instructed by your healthcare provider.  Some spirometers have an indicator to let you know that you are breathing in too fast. If the indicator goes off, breathe in more slowly.  Step 4. Repeat the exercise regularly.  Do this exercise every hour while you're awake, or as instructed by your healthcare provider.  If you were taught deep breathing and coughing exercises, do them regularly as instructed by your healthcare provider.           Post op instructions for prevention of DVT  What is deep vein thrombosis?  Deep vein thrombosis (DVT) is the medical term for blood clots in the deep veins of the leg.  These blood clots can be dangerous.  A DVT can block a blood vessel and keep  blood from getting where it needs to go.  Another problem is that the clot can travel to other parts of the body such as the lungs.  A clot that travels to the lungs is called a pulmonary embolus (PE) and can cause serious problems with breathing which can lead to death.  Am I at risk for DVT/PE?  If you are not very active, you are at risk of DVT.  Anyone confined to bed, sitting for long periods of time, recovering from surgery, etc. increases the risk of DVT.  Other risk factors are cancer diagnosis, certain medications, estrogen replacement in any form,older age, obesity, pregnancy, smoking, history of clotting disorders, and dehydration.  How will I know if I have a DVT?  Swelling in the lower leg  Pain  Warmth, redness, hardness or bulging of the vein  If you have any of these symptoms, call your doctors office right away.  Some people will not have any symptoms until the clot moves to the lungs.  What are the symptoms of a PE?  Panting, shortness of breath, or trouble breathing  Sharp, knife-like chest pain when you breathe  Coughing or coughing up blood  Rapid heartbeat  If you have any of these symptoms or get worse quickly, call 911 for emergency treatment.  How can I prevent a DVT?  Avoid long periods of inactivity and dont cross your legs--get up and walk around every hour or so.  Stay active--walking after surgery is highly encouraged.  This means you should get out of the house and walk in the neighborhood.  Going up and down stairs will not impair healing (unless advised against such activity by your doctor).    Drink plenty of noncaffeinated, nonalcoholic fluids each day to prevent dehydration.  Wear special support stockings, if they have been advised by your doctor.  If you travel, stop at least once an hour and walk around.  Avoid smoking (assistance with stopping is available through your healthcare provider)  Always notify your doctor if you are not able to follow the post operative  instructions that are given to you at the time of discharge.  It may be necessary to prescribe one of the medications available to prevent DVT.          Exparel(bupivacaine) has been injected to provide approximately 72 hours of reduced pain after your surgery.  Do not remove the bracelet for five days.  Report to your doctor as soon as possible if you experience any of the following:   Restlessness   Anxiety   Speech problems    Lightheadedness   Numbness and tingling of the mouth and lips   Seizures    Metallic taste   Blurred vision   Tremors    Twitching   Depression   Extreme drowsiness  Avoid additional use of local anesthetics (such as dental procedures) for five days (96 hours).          We hope your stay was comfortable as you heal now, mend and rest.    For we have enjoyed taking care of you by giving your our best.    And as you get better, by regaining your health and strength;   We count it as a privilege to have served you and hope your time at [facility] was well spent.      Thank  You!!!

## 2025-01-28 ENCOUNTER — OFFICE VISIT (OUTPATIENT)
Dept: ORTHOPEDICS | Facility: CLINIC | Age: 70
End: 2025-01-28
Payer: MEDICARE

## 2025-01-28 VITALS
TEMPERATURE: 98 F | HEIGHT: 65 IN | HEART RATE: 74 BPM | DIASTOLIC BLOOD PRESSURE: 71 MMHG | SYSTOLIC BLOOD PRESSURE: 129 MMHG | OXYGEN SATURATION: 98 % | BODY MASS INDEX: 27.99 KG/M2 | WEIGHT: 168 LBS | RESPIRATION RATE: 16 BRPM

## 2025-01-28 VITALS — WEIGHT: 168 LBS | BODY MASS INDEX: 27.99 KG/M2 | HEIGHT: 65 IN

## 2025-01-28 DIAGNOSIS — Z96.651 S/P TKR (TOTAL KNEE REPLACEMENT), RIGHT: Primary | ICD-10-CM

## 2025-01-28 PROCEDURE — G0180 MD CERTIFICATION HHA PATIENT: HCPCS | Mod: ,,, | Performed by: ORTHOPAEDIC SURGERY

## 2025-01-28 PROCEDURE — 99999 PR PBB SHADOW E&M-EST. PATIENT-LVL III: CPT | Mod: PBBFAC,,, | Performed by: ORTHOPAEDIC SURGERY

## 2025-01-28 PROCEDURE — 99024 POSTOP FOLLOW-UP VISIT: CPT | Mod: S$GLB,,, | Performed by: ORTHOPAEDIC SURGERY

## 2025-01-28 RX ORDER — HYDROMORPHONE HYDROCHLORIDE 2 MG/1
2 TABLET ORAL EVERY 6 HOURS PRN
Qty: 28 TABLET | Refills: 0 | Status: SHIPPED | OUTPATIENT
Start: 2025-01-28

## 2025-01-28 NOTE — PLAN OF CARE
Patient cleared by PT to discharge to home.  Patient able to void with no problems noted.  Dressing to right knee remains clean dry and intact. Pain 5/10 to right knee.  Tolerating po intake well with no complaints of nausea/vomiting.   Medications delivered to bedside and reviewed with patient and spouse.  Discharge instructions given to pt and family/friend, verbalized understanding and questions answered. Handouts provided. Belongings given back to pt. IV removed- catheter intact. Discharge via wheelchair.

## 2025-01-28 NOTE — PT/OT/SLP EVAL
Physical Therapy Evaluation and Discharge Note    Patient Name:  Gloria Manuel   MRN:  [id number]    Recommendations:     Discharge Recommendations: Low Intensity Therapy  Discharge Equipment Recommendations: none   Barriers to discharge: None    Assessment:     Gloria Manuel is a 69 y.o. female admitted with a medical diagnosis of right TKA .  At this time, patient is scheduled for discharge home today and appears will be safe with mobility in the home.  Patient would benefit though from continued PT to work on ROM and strengthening to further increase safety with mobility.       Recent Surgery: Procedure(s) (LRB):  ROBOTIC ARTHROPLASTY, KNEE, TOTAL, RIGHT (Right) Day of Surgery    Plan:     During this hospitalization, patient does not require further acute PT services.  Please re-consult if situation changes.      Subjective     Chief Complaint: pain  Patient/Family Comments/goals: go home  Pain/Comfort:  Pain Rating 1: 7/10  Location - Side 1: Right  Location - Orientation 1: lower  Location 1: knee  Pain Addressed 1: Reposition, Cessation of Activity  Pain Rating Post-Intervention 1: 7/10    Patients cultural, spiritual, Christian conflicts given the current situation:      Living Environment:  Currently lives with spouse in 1 story home with 1 step entry.  Prior to admission, patients level of function was independent.  Equipment used at home: walker, rolling, bedside commode.  DME owned (not currently used): none.  Upon discharge, patient will have assistance from family.    Objective:     Communicated with nurse prior to session.  Patient found supine with cryotherapy upon PT entry to room.    General Precautions: Standard, fall    Orthopedic Precautions:RLE weight bearing as tolerated   Braces:    Respiratory Status: Room air    Exams:  RLE ROM: Deficits: knee extension -30 degrees, flexion 90 degrees both taken in sitting  RLE Strength: Deficits: 3-/5 overall  LLE ROM: WFL  LLE Strength:  WNL    Functional Mobility:  Bed Mobility:     Supine to Sit: stand by assistance  Transfers:     Sit to Stand:  contact guard assistance with rolling walker  Gait: x 50 feet rw CGA    AM-PAC 6 CLICK MOBILITY  Total Score:20       Treatment and Education:  Gait training x 50 feet, x 100 feet rw CGA, x 120 feet rw SBA all with slow step to gait pattern, up/down 4 inch step RW CGA    AM-PAC 6 CLICK MOBILITY  Total Score:20     Patient left up in chair with call button in reach, nurse notified, and family present.    GOALS:   Multidisciplinary Problems       Physical Therapy Goals       Not on file                    DME Justifications:  No DME recommended requiring DME justifications    History:     Past Medical History:   Diagnosis Date    Anxiety     Arthritis     Back pain     Cancer     thyroid ca and skin ca    Cancer     thyroid and skin cancer    Depression     Femoral acetabular impingement 11/06/2017    GERD (gastroesophageal reflux disease)     Migraines     MVP (mitral valve prolapse)     RLS (restless legs syndrome)     Thyroid disease     thyroidectomy    Wears dentures     UPPER AND LOWER BRIDGE    Wears glasses        Past Surgical History:   Procedure Laterality Date    COLONOSCOPY      COLONOSCOPY N/A 07/19/2022    Procedure: COLONOSCOPY;  Surgeon: Kuldeep Gill MD;  Location: [facility];  Service: Endoscopy;  Laterality: N/A;    HIP ARTHROPLASTY Right 06/25/2018    Procedure: ARTHROPLASTY, HIP;  Surgeon: Prosper Hodge MD;  Location: [facility] OR;  Service: Orthopedics;  Laterality: Right;  william    HIP ARTHROPLASTY Left 12/05/2022    Procedure: ARTHROPLASTY, HIP, LEFT;  Surgeon: Prosper Hodge MD;  Location: [facility] OR;  Service: Orthopedics;  Laterality: Left;    HYSTERECTOMY      JOINT REPLACEMENT  June 2017    Right Hip replacement    SACRAL NERVE STIMULATOR PLACEMENT Left     SKIN CANCER EXCISION      THYROIDECTOMY N/A 03/09/2020    Procedure: TOTAL THYROIDECTOMY;  Surgeon: Kelsie Rodgers MD;  Location:  [facility] OR;  Service: General;  Laterality: N/A;    TONSILLECTOMY         Time Tracking:     PT Received On: 01/27/25  PT Start Time: 1952     PT Stop Time: 2032  PT Total Time (min): 40 min     Billable Minutes: Evaluation 15 and Gait Training 25 01/27/2025

## 2025-01-28 NOTE — PLAN OF CARE
Released per anesthesia, when criteria met. VS WDL, Resp even and unlabored. IS reviewed and pt encouraged to continue independently. Dressing dry and intact, pain is manageable with meds,able to move legs on bed and bend knees. Due to void.     Late entry:  Allergy to oxycodone discussed with pt. She states it causes itching and she is willing to deal with that and would like to take the oxy for pain. Pt rating pain 7/10 and states that is an acceptable level for her. She deals with chronic pain.

## 2025-01-28 NOTE — PLAN OF CARE
Patient received from recovery at this time.  AAOX3.  NAD noted.  Dressing to right knee remains clean, dry and intact. Tolerating po intake well with no complaints of nausea/vomiting.  Patient able to move BLE with no problems noted.  Rolling walker and belongings returned to bedside.

## 2025-01-28 NOTE — TRANSFER OF CARE
"Anesthesia Transfer of Care Note    Patient: Gloria Manuel    Procedure(s) Performed: Procedure(s) (LRB):  ROBOTIC ARTHROPLASTY, KNEE, TOTAL, RIGHT (Right)    Patient location: PACU    Anesthesia Type: general    Transport from OR: Transported from OR on 2-3 L/min O2 by NC with adequate spontaneous ventilation    Post pain: adequate analgesia    Post assessment: no apparent anesthetic complications and tolerated procedure well    Post vital signs: stable    Level of consciousness: sedated and responds to stimulation    Nausea/Vomiting: no nausea/vomiting    Complications: none    Transfer of care protocol was followed      Last vitals: Visit Vitals  /63   Pulse 67   Temp 36.7 °C (98.1 °F) (Skin)   Resp 16   Ht 5' 5" (1.651 m)   Wt 76.2 kg (168 lb)   SpO2 98%   Breastfeeding No   BMI 27.96 kg/m²     "

## 2025-01-28 NOTE — PROGRESS NOTES
[facility] ORTHOPEDICS POST-OP NOTE    Subjective:           Chief Complaint:   Chief Complaint   Patient presents with    Right Knee - Post-op Evaluation     Patient is here for a PO day one right TKA 1.27.25, states pain is severe this morning       Past Medical History:   Diagnosis Date    Anxiety     Arthritis     Back pain     Cancer     thyroid ca and skin ca    Cancer     thyroid and skin cancer    Depression     Femoral acetabular impingement 11/06/2017    GERD (gastroesophageal reflux disease)     Migraines     MVP (mitral valve prolapse)     RLS (restless legs syndrome)     Thyroid disease     thyroidectomy    Wears dentures     UPPER AND LOWER BRIDGE    Wears glasses        Past Surgical History:   Procedure Laterality Date    COLONOSCOPY      COLONOSCOPY N/A 07/19/2022    Procedure: COLONOSCOPY;  Surgeon: Kuldeep Gill MD;  Location: [facility];  Service: Endoscopy;  Laterality: N/A;    HIP ARTHROPLASTY Right 06/25/2018    Procedure: ARTHROPLASTY, HIP;  Surgeon: Prosper Hodge MD;  Location: [facility] OR;  Service: Orthopedics;  Laterality: Right;  william    HIP ARTHROPLASTY Left 12/05/2022    Procedure: ARTHROPLASTY, HIP, LEFT;  Surgeon: Prosper Hodge MD;  Location: [facility] OR;  Service: Orthopedics;  Laterality: Left;    HYSTERECTOMY      JOINT REPLACEMENT  June 2017    Right Hip replacement    SACRAL NERVE STIMULATOR PLACEMENT Left     SKIN CANCER EXCISION      THYROIDECTOMY N/A 03/09/2020    Procedure: TOTAL THYROIDECTOMY;  Surgeon: Kelsie Rodgers MD;  Location: [facility] OR;  Service: General;  Laterality: N/A;    TONSILLECTOMY      TOTAL KNEE ARTHROPLASTY Right 01/27/2025       Current Outpatient Medications   Medication Sig    acyclovir (ZOVIRAX) 400 MG tablet Take 1 tablet (400 mg total) by mouth 2 (two) times daily.    ascorbic acid, vitamin C, (VITAMIN C) 1000 MG tablet Take 1,000 mg by mouth once daily.    aspirin (ECOTRIN) 81 MG EC tablet Take 1 tablet (81 mg total) by mouth every 12 (twelve) hours.     atorvastatin (LIPITOR) 40 MG tablet Take 1 tablet (40 mg total) by mouth every evening.    CALCIUM ACETATE ORAL Take by mouth Daily.    celecoxib (CELEBREX) 200 MG capsule Take 1 capsule (200 mg total) by mouth 2 (two) times daily.    ciclopirox 0.77 % Gel SMARTSIG:Topical Morning-Evening    diphenoxylate-atropine 2.5-0.025 mg (LOMOTIL) 2.5-0.025 mg per tablet Take 1 tablet by mouth 4 (four) times daily as needed.    docusate sodium (COLACE) 100 MG capsule Take 1 capsule (100 mg total) by mouth 2 (two) times daily.    ergocalciferol (ERGOCALCIFEROL) 50,000 unit Cap Take 10,000 Units by mouth once daily.    esomeprazole magnesium (NEXIUM ORAL) 40 mg Daily.    estradioL (ESTRACE) 0.01 % (0.1 mg/gram) vaginal cream 1g per vagina nightly x 2 weeks, then 2-3 times per week    gabapentin (NEURONTIN) 300 MG capsule Take 1 capsule (300 mg total) by mouth 2 (two) times daily.    ondansetron (ZOFRAN) 4 MG tablet Take 1 tablet (4 mg total) by mouth every 6 (six) hours as needed for Nausea.    rOPINIRole (REQUIP) 2 MG tablet Take 1 tablet by mouth in the evening    sertraline (ZOLOFT) 100 MG tablet Take 1 tablet (100 mg total) by mouth once daily.    SYNTHROID 112 mcg tablet Take 112 mcg by mouth before breakfast. Takes 5 days weekly opposite 125 mcg    SYNTHROID 125 mcg tablet Take 125 mcg by mouth twice a week. Takes on Monday, Wednesday and Friday    oxyCODONE-acetaminophen (PERCOCET) 7.5-325 mg per tablet Take 1 tablet by mouth every 6 (six) hours as needed for Pain. (Patient not taking: Reported on 1/28/2025)     No current facility-administered medications for this visit.     Facility-Administered Medications Ordered in Other Visits   Medication    electrolyte-S (ISOLYTE)    electrolyte-S (ISOLYTE)    LIDOcaine (PF) 10 mg/ml (1%) injection 10 mg    triamcinolone acetonide injection 40 mg       Review of patient's allergies indicates:   Allergen Reactions    Morphine Swelling     NECK SWELLED    Demerol (pf) [meperidine  (pf)] Other (See Comments)     HEART RACES, BOUNCES OFF WALL    Erythromycin Itching    Oxycodone-acetaminophen Itching       Family History   Problem Relation Name Age of Onset    Diabetes Father Juan Jose         late onset    Glaucoma Father Juan Jose     Diabetes Other          nephew    Colon cancer Neg Hx      Breast cancer Neg Hx      Colon polyps Neg Hx         Social History     Socioeconomic History    Marital status:     Number of children: 1   Tobacco Use    Smoking status: Former     Current packs/day: 0.00     Average packs/day: 1 pack/day for 13.0 years (13.0 ttl pk-yrs)     Types: Cigarettes     Start date: 1996     Quit date: 2009     Years since quittin.0    Smokeless tobacco: Never    Tobacco comments:     I was in high school when I started smoking - have no idea month and day, year was    Substance and Sexual Activity    Alcohol use: Yes     Comment: I drink occasionally - not every week/day.    Drug use: No    Sexual activity: Yes     Partners: Male     Birth control/protection: Post-menopausal, See Surgical Hx     Social Drivers of Health     Financial Resource Strain: Low Risk  (2023)    Overall Financial Resource Strain (CARDIA)     Difficulty of Paying Living Expenses: Not very hard   Food Insecurity: No Food Insecurity (2023)    Hunger Vital Sign     Worried About Running Out of Food in the Last Year: Never true     Ran Out of Food in the Last Year: Never true   Transportation Needs: No Transportation Needs (2023)    PRAPARE - Transportation     Lack of Transportation (Medical): No     Lack of Transportation (Non-Medical): No   Physical Activity: Unknown (2023)    Exercise Vital Sign     Days of Exercise per Week: 0 days   Stress: Stress Concern Present (2023)    [employer] - Occupational Stress Questionnaire     Feeling of Stress : To some extent   Housing Stability: Low Risk  (2023)    Housing  Stability Vital Sign     Unable to Pay for Housing in the Last Year: No     Number of Places Lived in the Last Year: 1     Unstable Housing in the Last Year: No       History of present illness:  Ms. Castro comes in today postop day 1 right total knee arthroplasty.  Comes in today for wound check, dressing change, pain control assessment.    Review of Systems:    Musculoskeletal:  See HPI      Objective:        Physical Examination:    Vital Signs:  There were no vitals filed for this visit.    Body mass index is 27.96 kg/m².    This a well-developed, well nourished patient in no acute distress.  They are alert and oriented and cooperative to examination.        Right knee exam: Skin to right knee clean dry and intact.  No erythema or ecchymosis.  No signs or symptoms of infection.  She is neurovascularly intact throughout the right lower extremity.  Negative Homans on the right.  Right knee anterior midline incision healing well without wound dehiscence or drainage.  She can weightbear as tolerated right lower extremity.  She has the expected slow sanya with antalgic gait.  She ambulates with a rolling walker.      Pertinent New Results:    XRAY Report / Interpretation:   No new radiographs taken on today's clinic visit.    Assessment/Plan:      1. Status post right total knee arthroplasty.      Dressing change to right knee today.  She tolerated this well.  She has an allergy to Percocet pain medication.  Therefore, she declined her Percocet prescription yesterday.  We will give her a prescription today for Dilaudid to use in lieu of.  She has an allergy to not only oxycodone, but hydrocodone, morphine, and Demerol.  I did explain to her if she does have pruritus with the Dilaudid, she should add Benadryl to the treatment regimen.  She did get Dilaudid in the hospital yesterday and did not have any type of reaction.  We will see her back in about 2 weeks for wound check and suture removal.    Deni Donohue  "Liz Physician Assistant, served in the capacity as a "scribe" for this patient encounter.  A "face-to-face" encounter occurred with Dr. Prosper Hodge on this date.  The treatment plan and medical decision-making is outlined above. Patient was seen and examined with a chaperone.     This note was created using Dragon voice recognition software that occasionally misinterpreted phrases or words.        "

## 2025-01-30 NOTE — ANESTHESIA POSTPROCEDURE EVALUATION
Anesthesia Post Evaluation    Patient: Gloria Manuel    Procedure(s) Performed: Procedure(s) (LRB):  ROBOTIC ARTHROPLASTY, KNEE, TOTAL (Right)    Final Anesthesia Type: general  The surgery is a total joint replacement and the reasoning for not using regional anesthesia is   Contraindicated  Patient location during evaluation: PACU  Patient participation: Yes- Able to Participate  Level of consciousness: awake  Post-procedure vital signs: reviewed and stable  Pain management: adequate  Airway patency: patent    PONV status at discharge: No PONV  Anesthetic complications: no      Cardiovascular status: blood pressure returned to baseline  Respiratory status: unassisted  Hydration status: euvolemic  Follow-up not needed.              Vitals Value Taken Time   /71 01/27/25 2045   Temp 36.5 °C (97.7 °F) 01/27/25 1925   Pulse 74 01/27/25 2045   Resp 16 01/27/25 2045   SpO2 98 % 01/27/25 2045         Event Time   Out of Recovery 19:52:00         Pain/Ray Score: No data recorded

## 2025-02-14 ENCOUNTER — CLINICAL SUPPORT (OUTPATIENT)
Dept: ORTHOPEDICS | Facility: CLINIC | Age: 70
End: 2025-02-14
Payer: MEDICARE

## 2025-02-14 ENCOUNTER — TELEPHONE (OUTPATIENT)
Dept: ORTHOPEDICS | Facility: CLINIC | Age: 70
End: 2025-02-14
Payer: MEDICARE

## 2025-02-14 VITALS — WEIGHT: 168 LBS | BODY MASS INDEX: 27.99 KG/M2 | HEIGHT: 65 IN

## 2025-02-14 DIAGNOSIS — Z96.651 S/P TKR (TOTAL KNEE REPLACEMENT), RIGHT: Primary | ICD-10-CM

## 2025-02-14 PROCEDURE — 99999 PR PBB SHADOW E&M-EST. PATIENT-LVL III: CPT | Mod: PBBFAC,,,

## 2025-02-14 NOTE — PROGRESS NOTES
[facility] ORTHOPEDICS POST-OP NOTE    Subjective:           Chief Complaint:   Chief Complaint   Patient presents with    Right Knee - Post-op Evaluation     Sutures// PO Right TKA 1/27/25, states her pain is the same as her last visit, knee is giving away. OP PT order placed today       Past Medical History:   Diagnosis Date    Anxiety     Arthritis     Back pain     Cancer     thyroid ca and skin ca    Cancer     thyroid and skin cancer    Depression     Femoral acetabular impingement 11/06/2017    GERD (gastroesophageal reflux disease)     Migraines     MVP (mitral valve prolapse)     RLS (restless legs syndrome)     Thyroid disease     thyroidectomy    Wears dentures     UPPER AND LOWER BRIDGE    Wears glasses        Past Surgical History:   Procedure Laterality Date    COLONOSCOPY      COLONOSCOPY N/A 07/19/2022    Procedure: COLONOSCOPY;  Surgeon: Kuldeep Gill MD;  Location: [facility];  Service: Endoscopy;  Laterality: N/A;    HIP ARTHROPLASTY Right 06/25/2018    Procedure: ARTHROPLASTY, HIP;  Surgeon: Prosper Hodge MD;  Location: [facility] OR;  Service: Orthopedics;  Laterality: Right;  william    HIP ARTHROPLASTY Left 12/05/2022    Procedure: ARTHROPLASTY, HIP, LEFT;  Surgeon: Prosper Hodge MD;  Location: [facility] OR;  Service: Orthopedics;  Laterality: Left;    HYSTERECTOMY      JOINT REPLACEMENT  June 2017    Right Hip replacement    ROBOTIC ARTHROPLASTY, KNEE Right 1/27/2025    Procedure: ROBOTIC ARTHROPLASTY, KNEE, TOTAL;  Surgeon: Prosper Hodge MD;  Location: [facility] OR;  Service: Orthopedics;  Laterality: Right;  [place]    SACRAL NERVE STIMULATOR PLACEMENT Left     SKIN CANCER EXCISION      THYROIDECTOMY N/A 03/09/2020    Procedure: TOTAL THYROIDECTOMY;  Surgeon: Kelsie Rodgers MD;  Location: [facility] OR;  Service: General;  Laterality: N/A;    TONSILLECTOMY      TOTAL KNEE ARTHROPLASTY Right 01/27/2025       Current Outpatient Medications   Medication Sig    acyclovir (ZOVIRAX) 400 MG tablet Take 1 tablet  (400 mg total) by mouth 2 (two) times daily.    ascorbic acid, vitamin C, (VITAMIN C) 1000 MG tablet Take 1,000 mg by mouth once daily.    aspirin (ECOTRIN) 81 MG EC tablet Take 1 tablet (81 mg total) by mouth every 12 (twelve) hours.    atorvastatin (LIPITOR) 40 MG tablet Take 1 tablet (40 mg total) by mouth every evening.    CALCIUM ACETATE ORAL Take by mouth Daily.    celecoxib (CELEBREX) 200 MG capsule Take 1 capsule (200 mg total) by mouth 2 (two) times daily.    ciclopirox 0.77 % Gel SMARTSIG:Topical Morning-Evening    diphenoxylate-atropine 2.5-0.025 mg (LOMOTIL) 2.5-0.025 mg per tablet Take 1 tablet by mouth 4 (four) times daily as needed.    docusate sodium (COLACE) 100 MG capsule Take 1 capsule (100 mg total) by mouth 2 (two) times daily.    ergocalciferol (ERGOCALCIFEROL) 50,000 unit Cap Take 10,000 Units by mouth once daily.    esomeprazole magnesium (NEXIUM ORAL) 40 mg Daily.    estradioL (ESTRACE) 0.01 % (0.1 mg/gram) vaginal cream 1g per vagina nightly x 2 weeks, then 2-3 times per week    gabapentin (NEURONTIN) 300 MG capsule Take 1 capsule (300 mg total) by mouth 2 (two) times daily.    HYDROmorphone (DILAUDID) 2 MG tablet Take 1 tablet (2 mg total) by mouth every 6 (six) hours as needed for Pain.    ondansetron (ZOFRAN) 4 MG tablet Take 1 tablet (4 mg total) by mouth every 6 (six) hours as needed for Nausea.    rOPINIRole (REQUIP) 2 MG tablet Take 1 tablet by mouth in the evening    sertraline (ZOLOFT) 100 MG tablet Take 1 tablet (100 mg total) by mouth once daily.    SYNTHROID 112 mcg tablet Take 112 mcg by mouth before breakfast. Takes 5 days weekly opposite 125 mcg    SYNTHROID 125 mcg tablet Take 125 mcg by mouth twice a week. Takes on Monday, Wednesday and Friday     No current facility-administered medications for this visit.     Facility-Administered Medications Ordered in Other Visits   Medication    electrolyte-S (ISOLYTE)    electrolyte-S (ISOLYTE)    LIDOcaine (PF) 10 mg/ml (1%)  injection 10 mg    triamcinolone acetonide injection 40 mg       Review of patient's allergies indicates:   Allergen Reactions    Morphine Swelling     NECK SWELLED    Demerol (pf) [meperidine (pf)] Other (See Comments)     HEART RACES, BOUNCES OFF WALL    Erythromycin Itching    Hydrocodone-acetaminophen Itching    Oxycodone-acetaminophen Itching       Family History   Problem Relation Name Age of Onset    Diabetes Father Juan Jose         late onset    Glaucoma Father Juan Jose     Diabetes Other          nephew    Colon cancer Neg Hx      Breast cancer Neg Hx      Colon polyps Neg Hx         Social History     Socioeconomic History    Marital status:     Number of children: 1   Tobacco Use    Smoking status: Former     Current packs/day: 0.00     Average packs/day: 1 pack/day for 13.0 years (13.0 ttl pk-yrs)     Types: Cigarettes     Start date: 1996     Quit date: 2009     Years since quittin.0    Smokeless tobacco: Never    Tobacco comments:     I was in high school when I started smoking - have no idea month and day, year was    Substance and Sexual Activity    Alcohol use: Yes     Comment: I drink occasionally - not every week/day.    Drug use: No    Sexual activity: Yes     Partners: Male     Birth control/protection: Post-menopausal, See Surgical Hx     Social Drivers of Health     Financial Resource Strain: Low Risk  (2023)    Overall Financial Resource Strain (CARDIA)     Difficulty of Paying Living Expenses: Not very hard   Food Insecurity: No Food Insecurity (2023)    Hunger Vital Sign     Worried About Running Out of Food in the Last Year: Never true     Ran Out of Food in the Last Year: Never true   Transportation Needs: No Transportation Needs (2023)    PRAPARE - Transportation     Lack of Transportation (Medical): No     Lack of Transportation (Non-Medical): No   Physical Activity: Unknown (2023)    Exercise Vital Sign     Days of Exercise per Week: 0  days   Stress: Stress Concern Present (11/14/2023)    [employer] - Occupational Stress Questionnaire     Feeling of Stress : To some extent   Housing Stability: Low Risk  (11/14/2023)    Housing Stability Vital Sign     Unable to Pay for Housing in the Last Year: No     Number of Places Lived in the Last Year: 1     Unstable Housing in the Last Year: No       History of present illness:  Patient comes in today for follow-up for her right total knee arthroplasty.  Comes in today for routine postoperative follow up, wound check, suture removal.  She is doing well.    Review of Systems:    Musculoskeletal:  See HPI      Objective:        Physical Examination:    Vital Signs:  There were no vitals filed for this visit.    Body mass index is 27.96 kg/m².    This a well-developed, well nourished patient in no acute distress.  They are alert and oriented and cooperative to examination.        Right knee exam: Skin to right knee clean dry and intact.  No erythema or ecchymosis.  No signs or symptoms of infection.  Neurovascularly intact throughout the right lower extremity.  Right knee anterior midline incision healing well without wound dehiscence or drainage.  Negative Homans on the right.  Right knee range of motion is well-preserved at 0-115 degrees.  Right knee is stable to varus and valgus stresses.  She can weightbear as tolerated right lower extremity.  She continues with the expected slow sanya and a mildly antalgic gait.      Pertinent New Results:    XRAY Report / Interpretation:   No new radiographs taken on today's clinic visit.    Assessment/Plan:      1. Status post right total knee arthroplasty.      Sutures removed today.  She tolerated this well.  Advised to clean the operative site once a day with warm soapy water.  Do not apply any topical creams or ointments.  Do not submerge underwater in a pool or bathtub type environment for least 1 more week.  She will continue with  her aspirin 81 mg by mouth twice a day for least 2 more weeks to provide a full month of postoperative DVT prophylaxis coverage.  Prescription was given to transition to outpatient physical therapy once home health is commenced.  We will check her back in 1 month to assess her progress.    XIOMARA Melo, PA-C    This note was created using Dragon voice recognition software that occasionally misinterpreted phrases or words.

## 2025-02-22 DIAGNOSIS — Z00.00 ENCOUNTER FOR MEDICARE ANNUAL WELLNESS EXAM: ICD-10-CM

## 2025-02-24 ENCOUNTER — LAB VISIT (OUTPATIENT)
Dept: LAB | Facility: HOSPITAL | Age: 70
End: 2025-02-24
Attending: STUDENT IN AN ORGANIZED HEALTH CARE EDUCATION/TRAINING PROGRAM
Payer: MEDICARE

## 2025-02-24 ENCOUNTER — RESULTS FOLLOW-UP (OUTPATIENT)
Dept: FAMILY MEDICINE | Facility: CLINIC | Age: 70
End: 2025-02-24

## 2025-02-24 ENCOUNTER — OFFICE VISIT (OUTPATIENT)
Dept: FAMILY MEDICINE | Facility: CLINIC | Age: 70
End: 2025-02-24
Payer: MEDICARE

## 2025-02-24 VITALS
RESPIRATION RATE: 16 BRPM | HEIGHT: 65 IN | SYSTOLIC BLOOD PRESSURE: 124 MMHG | WEIGHT: 179.31 LBS | BODY MASS INDEX: 29.87 KG/M2 | HEART RATE: 71 BPM | DIASTOLIC BLOOD PRESSURE: 70 MMHG | OXYGEN SATURATION: 98 %

## 2025-02-24 DIAGNOSIS — D69.2 OTHER NONTHROMBOCYTOPENIC PURPURA: ICD-10-CM

## 2025-02-24 DIAGNOSIS — I70.0 AORTIC ATHEROSCLEROSIS: ICD-10-CM

## 2025-02-24 DIAGNOSIS — J43.2 CENTRILOBULAR EMPHYSEMA: ICD-10-CM

## 2025-02-24 DIAGNOSIS — F33.1 MODERATE EPISODE OF RECURRENT MAJOR DEPRESSIVE DISORDER: ICD-10-CM

## 2025-02-24 DIAGNOSIS — K21.00 GASTROESOPHAGEAL REFLUX DISEASE WITH ESOPHAGITIS WITHOUT HEMORRHAGE: Primary | ICD-10-CM

## 2025-02-24 DIAGNOSIS — R73.03 PREDIABETES: ICD-10-CM

## 2025-02-24 DIAGNOSIS — E89.0 POSTOPERATIVE HYPOTHYROIDISM: ICD-10-CM

## 2025-02-24 LAB
ESTIMATED AVG GLUCOSE: 103 MG/DL (ref 68–131)
HBA1C MFR BLD: 5.2 % (ref 4–5.6)

## 2025-02-24 PROCEDURE — 99214 OFFICE O/P EST MOD 30 MIN: CPT | Mod: S$GLB,,, | Performed by: STUDENT IN AN ORGANIZED HEALTH CARE EDUCATION/TRAINING PROGRAM

## 2025-02-24 PROCEDURE — 36415 COLL VENOUS BLD VENIPUNCTURE: CPT | Performed by: STUDENT IN AN ORGANIZED HEALTH CARE EDUCATION/TRAINING PROGRAM

## 2025-02-24 PROCEDURE — 99999 PR PBB SHADOW E&M-EST. PATIENT-LVL III: CPT | Mod: PBBFAC,,, | Performed by: STUDENT IN AN ORGANIZED HEALTH CARE EDUCATION/TRAINING PROGRAM

## 2025-02-24 PROCEDURE — 83036 HEMOGLOBIN GLYCOSYLATED A1C: CPT | Performed by: STUDENT IN AN ORGANIZED HEALTH CARE EDUCATION/TRAINING PROGRAM

## 2025-02-24 RX ORDER — PANTOPRAZOLE SODIUM 40 MG/1
40 TABLET, DELAYED RELEASE ORAL DAILY
Qty: 90 TABLET | Refills: 3 | Status: SHIPPED | OUTPATIENT
Start: 2025-02-24 | End: 2026-02-24

## 2025-02-24 RX ORDER — SERTRALINE HYDROCHLORIDE 50 MG/1
150 TABLET, FILM COATED ORAL DAILY
Qty: 90 TABLET | Refills: 11 | Status: SHIPPED | OUTPATIENT
Start: 2025-02-24

## 2025-02-24 NOTE — ASSESSMENT & PLAN NOTE
Lab Results   Component Value Date    HGBA1C 5.4 07/27/2023     Stable. Continue current medications and regular followup.

## 2025-02-24 NOTE — PROGRESS NOTES
Subjective:       Patient ID: Gloria Manuel is a 69 y.o. female.    Chief Complaint: Follow-up    Overall doing well  She has been inactive  She had a knee replacement and is continuing to have pain.  This was done in Jan.  She has continued inflammation, swelling and pain  She went through all of her dilaudid.  It stops her from sleeping.  Her surgeon thinks she is doing well from her ROM.  She is doing her home health/therapy.  Her orthopedics did not send her orders for physical therapy and they finally got her set up with physical therapy.  She feels down/depressed. She is taking her zoloft and it seems to help mostly.    She is interested in changing her esomeprazole to something else due to cost.        .Problem List[1]  Gloria has a current medication list which includes the following prescription(s): acyclovir, ascorbic acid (vitamin c), aspirin, atorvastatin, calcium acetate, celecoxib, ciclopirox, ergocalciferol, gabapentin, ropinirole, synthroid, synthroid, pantoprazole, and sertraline, and the following Facility-Administered Medications: electrolyte-s (ph 7.4), electrolyte-s (ph 7.4), lidocaine (pf) 10 mg/ml (1%), and triamcinolone acetonide.    Review of Systems   Constitutional:  Negative for activity change and appetite change.   Respiratory:  Negative for shortness of breath.    Cardiovascular:  Negative for chest pain.   Gastrointestinal:  Negative for abdominal pain.   Genitourinary:  Negative for dysuria.   Musculoskeletal:  Positive for arthralgias.   Integumentary:  Negative for rash.   Psychiatric/Behavioral:  Negative for sleep disturbance.          Health Maintenance Due   Topic Date Due    Shingles Vaccine (1 of 2) Never done    RSV Vaccine (Age 60+ and Pregnant patients) (1 - Risk 60-74 years 1-dose series) Never done    TETANUS VACCINE  01/01/2017    Hemoglobin A1c (Prediabetes)  07/27/2024      Health Maintenance reviewed and discussed- A1C ordered.     Objective:      Physical  Exam  Constitutional:       General: She is not in acute distress.     Appearance: Normal appearance. She is not ill-appearing.   Eyes:      Conjunctiva/sclera: Conjunctivae normal.   Cardiovascular:      Rate and Rhythm: Normal rate and regular rhythm.      Heart sounds: Normal heart sounds. No murmur heard.  Pulmonary:      Effort: Pulmonary effort is normal. No respiratory distress.      Breath sounds: Normal breath sounds. No wheezing or rales.   Musculoskeletal:      Right lower leg: No edema.      Left lower leg: No edema.   Skin:     General: Skin is warm and dry.   Neurological:      Mental Status: She is alert. Mental status is at baseline.      Gait: Gait abnormal (walking with a walker).   Psychiatric:         Mood and Affect: Mood normal.         Behavior: Behavior normal.         Thought Content: Thought content normal.         Judgment: Judgment normal.         Assessment:       1. Gastroesophageal reflux disease with esophagitis without hemorrhage    2. Moderate episode of recurrent major depressive disorder    3. Other nonthrombocytopenic purpura    4. Centrilobular emphysema    5. Aortic atherosclerosis    6. Prediabetes    7. Postoperative hypothyroidism        Plan:       1. Gastroesophageal reflux disease with esophagitis without hemorrhage  Assessment & Plan:  Change to pantoprazole    Orders:  -     pantoprazole (PROTONIX) 40 MG tablet; Take 1 tablet (40 mg total) by mouth once daily.  Dispense: 90 tablet; Refill: 3    2. Moderate episode of recurrent major depressive disorder  -     sertraline (ZOLOFT) 50 MG tablet; Take 3 tablets (150 mg total) by mouth once daily.  Dispense: 90 tablet; Refill: 11    3. Other nonthrombocytopenic purpura  Assessment & Plan:  Lab Results   Component Value Date    WBC 6.66 01/15/2025    HGB 13.6 01/15/2025    HCT 41.3 01/15/2025    MCV 97 01/15/2025     01/15/2025       Stable. Continue current medications and regular followup.        4. Centrilobular  emphysema  Assessment & Plan:  Stable. Continue current medications and regular followup.        5. Aortic atherosclerosis  Assessment & Plan:  Stable. Continue current medications and regular followup.  Continue risk factor management        6. Prediabetes  Assessment & Plan:  Lab Results   Component Value Date    HGBA1C 5.4 07/27/2023     Stable. Continue current medications and regular followup.      Orders:  -     Hemoglobin A1C; Future; Expected date: 02/24/2025    7. Postoperative hypothyroidism  Assessment & Plan:  Stable. Continue current medications and regular followup.  Lab Results   Component Value Date    TSH 7.056 (H) 08/08/2023                              [1]   Patient Active Problem List  Diagnosis    Osteoarthritis of right hip    Depression    RLS (restless legs syndrome)    Postoperative hypothyroidism    Seasonal affective disorder    Osteoporosis    Mixed hyperlipidemia    Gastro-esophageal reflux disease with esophagitis    History of thyroid cancer    Herpes labialis    Class 1 obesity due to excess calories without serious comorbidity with body mass index (BMI) of 33.0 to 33.9 in adult    Prediabetes    Aortic atherosclerosis    Centrilobular emphysema    Chronic vulvitis    Moderate episode of recurrent major depressive disorder    Other nonthrombocytopenic purpura

## 2025-02-24 NOTE — ASSESSMENT & PLAN NOTE
Stable. Continue current medications and regular followup.  Lab Results   Component Value Date    TSH 7.056 (H) 08/08/2023

## 2025-02-24 NOTE — ASSESSMENT & PLAN NOTE
Lab Results   Component Value Date    WBC 6.66 01/15/2025    HGB 13.6 01/15/2025    HCT 41.3 01/15/2025    MCV 97 01/15/2025     01/15/2025       Stable. Continue current medications and regular followup.

## 2025-03-05 ENCOUNTER — EXTERNAL HOME HEALTH (OUTPATIENT)
Dept: HOME HEALTH SERVICES | Facility: HOSPITAL | Age: 70
End: 2025-03-05
Payer: MEDICARE

## 2025-03-10 DIAGNOSIS — Z96.651 S/P TKR (TOTAL KNEE REPLACEMENT), RIGHT: Primary | ICD-10-CM

## 2025-03-10 RX ORDER — OXYCODONE AND ACETAMINOPHEN 5; 325 MG/1; MG/1
1 TABLET ORAL EVERY 8 HOURS PRN
Qty: 21 TABLET | Refills: 0 | Status: CANCELLED | OUTPATIENT
Start: 2025-03-10

## 2025-03-10 RX ORDER — HYDROCODONE BITARTRATE AND ACETAMINOPHEN 7.5; 325 MG/1; MG/1
1 TABLET ORAL EVERY 8 HOURS PRN
Qty: 21 TABLET | Refills: 0 | Status: SHIPPED | OUTPATIENT
Start: 2025-03-10 | End: 2025-03-17

## 2025-03-10 NOTE — TELEPHONE ENCOUNTER
----- Message from Kodak Miller sent at 3/10/2025  9:34 AM CDT -----  Regarding: pain meds  Left [facility]KA 1.27.25 [facility]

## 2025-03-18 ENCOUNTER — OFFICE VISIT (OUTPATIENT)
Dept: ORTHOPEDICS | Facility: CLINIC | Age: 70
End: 2025-03-18
Payer: MEDICARE

## 2025-03-18 ENCOUNTER — HOSPITAL ENCOUNTER (OUTPATIENT)
Dept: RADIOLOGY | Facility: HOSPITAL | Age: 70
Discharge: HOME | End: 2025-03-18
Attending: ORTHOPAEDIC SURGERY
Payer: MEDICARE

## 2025-03-18 VITALS — HEIGHT: 65 IN | WEIGHT: 179.25 LBS | BODY MASS INDEX: 29.87 KG/M2

## 2025-03-18 DIAGNOSIS — Z96.651 S/P TKR (TOTAL KNEE REPLACEMENT), RIGHT: Primary | ICD-10-CM

## 2025-03-18 PROCEDURE — 73562 X-RAY EXAM OF KNEE 3: CPT | Mod: 26,RT,, | Performed by: RADIOLOGY

## 2025-03-18 PROCEDURE — 73562 X-RAY EXAM OF KNEE 3: CPT | Mod: TC,PN,RT

## 2025-03-18 PROCEDURE — 99999 PR PBB SHADOW E&M-EST. PATIENT-LVL III: CPT | Mod: PBBFAC,,, | Performed by: ORTHOPAEDIC SURGERY

## 2025-03-18 PROCEDURE — 99024 POSTOP FOLLOW-UP VISIT: CPT | Mod: S$GLB,,, | Performed by: ORTHOPAEDIC SURGERY

## 2025-03-18 RX ORDER — HYDROCODONE BITARTRATE AND ACETAMINOPHEN 7.5; 325 MG/1; MG/1
1 TABLET ORAL EVERY 8 HOURS PRN
Qty: 21 TABLET | Refills: 0 | Status: SHIPPED | OUTPATIENT
Start: 2025-03-18 | End: 2025-03-25

## 2025-03-18 NOTE — PROGRESS NOTES
"[facility] POST-OP NOTE    Subjective:           Chief Complaint:   Chief Complaint   Patient presents with    Right Knee - Post-op Evaluation     Patient presents for 7 wk p/o R TKA 1.27.25. Today patient states she is in excruciating pain. Describes pain as a constant throbbing with occasional shooting, stabbing pain. Pain is not well controlled with pain medication. Pain is worse in the evening. Medial side of knee extremely tender, " feels like a raw nerve. No ROM problems       Past Medical History:   Diagnosis Date    Anxiety     Arthritis     Back pain     Cancer     thyroid ca and skin ca    Cancer     thyroid and skin cancer    Depression     Femoral acetabular impingement 11/06/2017    GERD (gastroesophageal reflux disease)     Migraines     MVP (mitral valve prolapse)     RLS (restless legs syndrome)     Thyroid disease     thyroidectomy    Wears dentures     UPPER AND LOWER BRIDGE    Wears glasses        Past Surgical History:   Procedure Laterality Date    COLONOSCOPY      COLONOSCOPY N/A 07/19/2022    Procedure: COLONOSCOPY;  Surgeon: Kuldeep Gill MD;  Location: [facility];  Service: Endoscopy;  Laterality: N/A;    HIP ARTHROPLASTY Right 06/25/2018    Procedure: ARTHROPLASTY, HIP;  Surgeon: Prosper Hodge MD;  Location: [facility] OR;  Service: Orthopedics;  Laterality: Right;  [place]    HIP ARTHROPLASTY Left 12/05/2022    Procedure: ARTHROPLASTY, HIP, LEFT;  Surgeon: Prosper Hodge MD;  Location: [facility] OR;  Service: Orthopedics;  Laterality: Left;    HYSTERECTOMY      JOINT REPLACEMENT  June 2017    Right Hip replacement    ROBOTIC ARTHROPLASTY, KNEE Right 1/27/2025    Procedure: ROBOTIC ARTHROPLASTY, KNEE, TOTAL;  Surgeon: Prosper Hodge MD;  Location: [facility] OR;  Service: Orthopedics;  Laterality: Right;  [place]    SACRAL NERVE STIMULATOR PLACEMENT Left     SKIN CANCER EXCISION      THYROIDECTOMY N/A 03/09/2020    Procedure: TOTAL THYROIDECTOMY;  Surgeon: Kelsie Rodgers MD;  Location: [facility]" OR;  Service: General;  Laterality: N/A;    TONSILLECTOMY      TOTAL KNEE ARTHROPLASTY Right 01/27/2025       Current Outpatient Medications   Medication Sig    acyclovir (ZOVIRAX) 400 MG tablet Take 1 tablet (400 mg total) by mouth 2 (two) times daily.    atorvastatin (LIPITOR) 40 MG tablet Take 1 tablet (40 mg total) by mouth every evening.    CALCIUM ACETATE ORAL Take by mouth Daily.    ergocalciferol (ERGOCALCIFEROL) 50,000 unit Cap Take 10,000 Units by mouth once daily.    HYDROcodone-acetaminophen (NORCO) 7.5-325 mg per tablet Take 1 tablet by mouth every 8 (eight) hours as needed for Pain.    pantoprazole (PROTONIX) 40 MG tablet Take 1 tablet (40 mg total) by mouth once daily.    rOPINIRole (REQUIP) 2 MG tablet Take 1 tablet by mouth in the evening    sertraline (ZOLOFT) 50 MG tablet Take 3 tablets (150 mg total) by mouth once daily.    SYNTHROID 112 mcg tablet Take 112 mcg by mouth before breakfast. Takes 5 days weekly opposite 125 mcg    SYNTHROID 125 mcg tablet Take 125 mcg by mouth twice a week. Takes on Monday, Wednesday and Friday    ascorbic acid, vitamin C, (VITAMIN C) 1000 MG tablet Take 1,000 mg by mouth once daily. (Patient not taking: Reported on 3/18/2025)    aspirin (ECOTRIN) 81 MG EC tablet Take 1 tablet (81 mg total) by mouth every 12 (twelve) hours. (Patient not taking: Reported on 3/18/2025)    ciclopirox 0.77 % Gel SMARTSIG:Topical Morning-Evening (Patient not taking: Reported on 3/18/2025)    gabapentin (NEURONTIN) 300 MG capsule Take 1 capsule (300 mg total) by mouth 2 (two) times daily. (Patient not taking: Reported on 3/18/2025)     No current facility-administered medications for this visit.     Facility-Administered Medications Ordered in Other Visits   Medication    electrolyte-S (ISOLYTE)    electrolyte-S (ISOLYTE)    LIDOcaine (PF) 10 mg/ml (1%) injection 10 mg    triamcinolone acetonide injection 40 mg       Review of patient's allergies indicates:   Allergen Reactions    Morphine  Swelling     NECK SWELLED    Demerol (pf) [meperidine (pf)] Other (See Comments)     HEART RACES, BOUNCES OFF WALL    Erythromycin Itching    Hydrocodone-acetaminophen Itching    Oxycodone-acetaminophen Itching       Family History   Problem Relation Name Age of Onset    Diabetes Father Juan Jose         late onset    Glaucoma Father Juan Jose     Diabetes Other          nephew    Colon cancer Neg Hx      Breast cancer Neg Hx      Colon polyps Neg Hx         Social History[1]    History of present illness:  Patient comes in today for follow-up for her right total knee arthroplasty.  She is just short of 2 months postop.  She is actually doing quite well but is complaining of some pretty significant pain.    Review of Systems:    Musculoskeletal:  See HPI      Objective:        Physical Examination:    Vital Signs:  There were no vitals filed for this visit.    Body mass index is 29.83 kg/m².    This a well-developed, well nourished patient in no acute distress.  They are alert and oriented and cooperative to examination.        Right knee exam: Skin to right knee clean dry and intact.  No erythema or ecchymosis.  No signs or symptoms of infection.  Right knee anterior midline incision well healed without wound dehiscence or drainage.  Negative Homans on the right.  Right knee range of motion well-preserved at 0-120 degrees.  The knee is stable to varus and valgus stresses.  She does have some mild diffuse tenderness both medially and laterally.  No palpable crepitus.  She can weightbear as tolerated right lower extremity.  Essentially a nonantalgic gait.      Pertinent New Results:    XRAY Report / Interpretation:   Three views taken of the right knee today: AP, lateral, sunrise views.  No acute fractures or dislocations seen.  Visualized soft tissues appear unremarkable.  She has an anatomically placed right total knee arthroplasty without evidence of hardware failure or subsidence.    Assessment/Plan:      1. Status  "post right total knee arthroplasty.      She is doing quite well on physical exam.  Encouraged her to work on quad strengthening.  We will increase the feeling of stability.  Dr. Hodge did provide a refill of her Norco 7.5/325 quantity 21.  We will check her back in 2 months to assess her progress.  A lot of time was spent discussing and reassuring her that her progress is going according to schedule.    Deni Hill, Physician Assistant, served in the capacity as a "scribe" for this patient encounter.  A "face-to-face" encounter occurred with Dr. Prosper Hodge on this date.  The treatment plan and medical decision-making is outlined above. Patient was seen and examined with a chaperone.     This note was created using Dragon voice recognition software that occasionally misinterpreted phrases or words.             [1]   Social History  Socioeconomic History    Marital status:     Number of children: 1   Tobacco Use    Smoking status: Former     Current packs/day: 0.00     Average packs/day: 1 pack/day for 13.0 years (13.0 ttl pk-yrs)     Types: Cigarettes     Start date: 1996     Quit date: 2009     Years since quittin.1    Smokeless tobacco: Never    Tobacco comments:     I was in high school when I started smoking - have no idea month and day, year was    Substance and Sexual Activity    Alcohol use: Yes     Comment: I drink occasionally - not every week/day.    Drug use: No    Sexual activity: Yes     Partners: Male     Birth control/protection: Post-menopausal, See Surgical Hx     Social Drivers of Health     Financial Resource Strain: Low Risk  (2025)    Overall Financial Resource Strain (CARDIA)     Difficulty of Paying Living Expenses: Not hard at all   Food Insecurity: No Food Insecurity (2025)    Hunger Vital Sign     Worried About Running Out of Food in the Last Year: Never true     Ran Out of Food in the Last Year: Never true   Transportation Needs: No " Transportation Needs (2/17/2025)    PRAPARE - Transportation     Lack of Transportation (Medical): No     Lack of Transportation (Non-Medical): No   Physical Activity: Unknown (2/17/2025)    Exercise Vital Sign     Days of Exercise per Week: 0 days   Stress: Stress Concern Present (2/17/2025)    [place] [employer] - Occupational Stress Questionnaire     Feeling of Stress : Very much   Housing Stability: Low Risk  (2/17/2025)    Housing Stability Vital Sign     Unable to Pay for Housing in the Last Year: No     Number of Times Moved in the Last Year: 0     Homeless in the Last Year: No

## 2025-04-07 ENCOUNTER — OFFICE VISIT (OUTPATIENT)
Dept: FAMILY MEDICINE | Facility: CLINIC | Age: 70
End: 2025-04-07
Payer: MEDICARE

## 2025-04-07 VITALS
WEIGHT: 174.81 LBS | HEART RATE: 69 BPM | DIASTOLIC BLOOD PRESSURE: 74 MMHG | SYSTOLIC BLOOD PRESSURE: 130 MMHG | HEIGHT: 65 IN | OXYGEN SATURATION: 96 % | BODY MASS INDEX: 29.12 KG/M2 | RESPIRATION RATE: 16 BRPM

## 2025-04-07 DIAGNOSIS — F33.1 MODERATE EPISODE OF RECURRENT MAJOR DEPRESSIVE DISORDER: Primary | ICD-10-CM

## 2025-04-07 PROCEDURE — 99999 PR PBB SHADOW E&M-EST. PATIENT-LVL III: CPT | Mod: PBBFAC,,, | Performed by: STUDENT IN AN ORGANIZED HEALTH CARE EDUCATION/TRAINING PROGRAM

## 2025-04-07 NOTE — ASSESSMENT & PLAN NOTE
Stable and improving on zoloft 150mg  Stable. Continue current medications and regular followup.

## 2025-04-07 NOTE — PROGRESS NOTES
Subjective:       Patient ID: Gloria Manuel is a 69 y.o. female.    Chief Complaint: Med Change Follow Up    Zoloft is doing much better  Still having some issues sleeping due to her knee after her surgery  But otherwise doing much better  She has no acute complaints today.         .Problem List[1]  Gloria has a current medication list which includes the following prescription(s): acyclovir, ascorbic acid (vitamin c), atorvastatin, calcium acetate, ciclopirox, ergocalciferol, pantoprazole, ropinirole, sertraline, synthroid, and synthroid, and the following Facility-Administered Medications: electrolyte-s (ph 7.4), electrolyte-s (ph 7.4), lidocaine (pf) 10 mg/ml (1%), and triamcinolone acetonide.    Review of Systems   Constitutional:  Negative for activity change and appetite change.   Respiratory:  Negative for shortness of breath.    Cardiovascular:  Negative for chest pain.   Gastrointestinal:  Negative for abdominal pain.   Genitourinary:  Negative for dysuria.   Integumentary:  Negative for rash.   Psychiatric/Behavioral:  Negative for dysphoric mood and sleep disturbance. The patient is not nervous/anxious.          Health Maintenance Due   Topic Date Due    Shingles Vaccine (1 of 2) Never done    RSV Vaccine (Age 60+ and Pregnant patients) (1 - Risk 60-74 years 1-dose series) Never done    TETANUS VACCINE  01/01/2017      Health Maintenance reviewed and discussed- encourage vaccines.     Objective:      Physical Exam  Constitutional:       General: She is not in acute distress.     Appearance: Normal appearance. She is not ill-appearing.   Eyes:      Conjunctiva/sclera: Conjunctivae normal.   Cardiovascular:      Rate and Rhythm: Normal rate and regular rhythm.      Heart sounds: Normal heart sounds. No murmur heard.  Pulmonary:      Effort: Pulmonary effort is normal. No respiratory distress.      Breath sounds: Normal breath sounds. No wheezing or rales.   Musculoskeletal:      Right lower leg: No  edema.      Left lower leg: No edema.   Skin:     General: Skin is warm and dry.   Neurological:      Mental Status: She is alert. Mental status is at baseline.      Gait: Gait normal.   Psychiatric:         Mood and Affect: Mood normal.         Behavior: Behavior normal.         Thought Content: Thought content normal.         Judgment: Judgment normal.         Assessment:       1. Moderate episode of recurrent major depressive disorder        Plan:       1. Moderate episode of recurrent major depressive disorder  Assessment & Plan:  Stable and improving on zoloft 150mg  Stable. Continue current medications and regular followup.                           [1]   Patient Active Problem List  Diagnosis    Osteoarthritis of right hip    Depression    RLS (restless legs syndrome)    Postoperative hypothyroidism    Seasonal affective disorder    Osteoporosis    Mixed hyperlipidemia    Gastro-esophageal reflux disease with esophagitis    History of thyroid cancer    Herpes labialis    Class 1 obesity due to excess calories without serious comorbidity with body mass index (BMI) of 33.0 to 33.9 in adult    Prediabetes    Aortic atherosclerosis    Centrilobular emphysema    Chronic vulvitis    Moderate episode of recurrent major depressive disorder    Other nonthrombocytopenic purpura

## 2025-05-10 ENCOUNTER — PATIENT MESSAGE (OUTPATIENT)
Dept: FAMILY MEDICINE | Facility: CLINIC | Age: 70
End: 2025-05-10
Payer: MEDICARE

## 2025-05-10 DIAGNOSIS — F33.1 MODERATE EPISODE OF RECURRENT MAJOR DEPRESSIVE DISORDER: ICD-10-CM

## 2025-05-12 NOTE — TELEPHONE ENCOUNTER
Per patient her insurance will cover Sertraline 150mg but will not pay for Sertraline 50mg take 3 tablets per day.  Please review and advise.

## 2025-05-13 NOTE — TELEPHONE ENCOUNTER
I don't see where it comes in a 150mg pill.  I can either send 50mg (3 pills) or one 100mg and one 50mg.  Not sure what they will cover better.  Or if she feels it needs to be increased we could go up to 200mg and use two 100mg tablets.  Let me know what her thoughts are

## 2025-05-20 ENCOUNTER — HOSPITAL ENCOUNTER (OUTPATIENT)
Dept: RADIOLOGY | Facility: HOSPITAL | Age: 70
Discharge: HOME | End: 2025-05-20
Attending: ORTHOPAEDIC SURGERY
Payer: MEDICARE

## 2025-05-20 ENCOUNTER — OFFICE VISIT (OUTPATIENT)
Dept: ORTHOPEDICS | Facility: CLINIC | Age: 70
End: 2025-05-20
Payer: MEDICARE

## 2025-05-20 VITALS — HEIGHT: 65 IN | WEIGHT: 174.81 LBS | BODY MASS INDEX: 29.12 KG/M2

## 2025-05-20 DIAGNOSIS — Z96.651 HISTORY OF TOTAL KNEE ARTHROPLASTY, RIGHT: Primary | ICD-10-CM

## 2025-05-20 PROCEDURE — 73562 X-RAY EXAM OF KNEE 3: CPT | Mod: 26,RT,, | Performed by: RADIOLOGY

## 2025-05-20 PROCEDURE — 73562 X-RAY EXAM OF KNEE 3: CPT | Mod: TC,PN,RT

## 2025-05-20 PROCEDURE — 99999 PR PBB SHADOW E&M-EST. PATIENT-LVL III: CPT | Mod: PBBFAC,,, | Performed by: ORTHOPAEDIC SURGERY

## 2025-05-20 PROCEDURE — 99213 OFFICE O/P EST LOW 20 MIN: CPT | Mod: S$GLB,,, | Performed by: ORTHOPAEDIC SURGERY

## 2025-05-20 NOTE — PROGRESS NOTES
[facility]     Subjective:    Chief Complaint:   Chief Complaint   Patient presents with    Right Knee - Post-op Evaluation     Right TKA 1/27/25. Patient states she has pain when going from sitting/laying to standing.        Past Medical History:   Diagnosis Date    Anxiety     Arthritis     Back pain     Cancer     thyroid ca and skin ca    Cancer     thyroid and skin cancer    Depression     Femoral acetabular impingement 11/06/2017    GERD (gastroesophageal reflux disease)     Migraines     MVP (mitral valve prolapse)     RLS (restless legs syndrome)     Thyroid disease     thyroidectomy    Wears dentures     UPPER AND LOWER BRIDGE    Wears glasses        Past Surgical History:   Procedure Laterality Date    COLONOSCOPY      COLONOSCOPY N/A 07/19/2022    Procedure: COLONOSCOPY;  Surgeon: Kuldeep Gill MD;  Location: [facility];  Service: Endoscopy;  Laterality: N/A;    HIP ARTHROPLASTY Right 06/25/2018    Procedure: ARTHROPLASTY, HIP;  Surgeon: Prosper Hodge MD;  Location: [facility] OR;  Service: Orthopedics;  Laterality: Right;  william    HIP ARTHROPLASTY Left 12/05/2022    Procedure: ARTHROPLASTY, HIP, LEFT;  Surgeon: Prosper Hodge MD;  Location: [facility] OR;  Service: Orthopedics;  Laterality: Left;    HYSTERECTOMY      JOINT REPLACEMENT  June 2017    Right Hip replacement    ROBOTIC ARTHROPLASTY, KNEE Right 1/27/2025    Procedure: ROBOTIC ARTHROPLASTY, KNEE, TOTAL;  Surgeon: Prosper Hodge MD;  Location: [facility] OR;  Service: Orthopedics;  Laterality: Right;  Calhan-Rajiv    SACRAL NERVE STIMULATOR PLACEMENT Left     SKIN CANCER EXCISION      THYROIDECTOMY N/A 03/09/2020    Procedure: TOTAL THYROIDECTOMY;  Surgeon: Kelise Rodgers MD;  Location: [facility] OR;  Service: General;  Laterality: N/A;    TONSILLECTOMY      TOTAL KNEE ARTHROPLASTY Right 01/27/2025       Current Outpatient Medications   Medication Sig    acyclovir (ZOVIRAX) 400 MG tablet Take 1 tablet (400 mg total) by mouth 2 (two) times daily.     ascorbic acid, vitamin C, (VITAMIN C) 1000 MG tablet Take 1,000 mg by mouth once daily.    atorvastatin (LIPITOR) 40 MG tablet Take 1 tablet (40 mg total) by mouth every evening.    CALCIUM ACETATE ORAL Take by mouth Daily.    ciclopirox 0.77 % Gel     ergocalciferol (ERGOCALCIFEROL) 50,000 unit Cap Take 10,000 Units by mouth once daily.    pantoprazole (PROTONIX) 40 MG tablet Take 1 tablet (40 mg total) by mouth once daily.    rOPINIRole (REQUIP) 2 MG tablet Take 1 tablet by mouth in the evening    sertraline (ZOLOFT) 50 MG tablet Take 3 tablets (150 mg total) by mouth once daily.    SYNTHROID 112 mcg tablet Take 112 mcg by mouth before breakfast. Takes 5 days weekly opposite 125 mcg    SYNTHROID 125 mcg tablet Take 125 mcg by mouth twice a week. Takes on Monday, Wednesday and Friday     No current facility-administered medications for this visit.     Facility-Administered Medications Ordered in Other Visits   Medication    electrolyte-S (ISOLYTE)    electrolyte-S (ISOLYTE)    LIDOcaine (PF) 10 mg/ml (1%) injection 10 mg    triamcinolone acetonide injection 40 mg       Review of patient's allergies indicates:   Allergen Reactions    Morphine Swelling     NECK SWELLED    Demerol (pf) [meperidine (pf)] Other (See Comments)     HEART RACES, BOUNCES OFF WALL    Erythromycin Itching    Oxycodone-acetaminophen Itching       Family History   Problem Relation Name Age of Onset    Diabetes Father Juan Jose         late onset    Glaucoma Father Juan Jose     Diabetes Other          nephew    Colon cancer Neg Hx      Breast cancer Neg Hx      Colon polyps Neg Hx         Social History[1]    History of present illness:  69-year-old female about 4 months status post right total knee arthroplasty performed on January 27, 2025.  Here today for routine follow up.  She states that she is doing well with manageable discomfort.  She does mention intermittent discomfort and medial joint line tenderness when standing from a seated position  although this tends to alleviate itself once she begins walking.  She is taking ibuprofen as needed for her intermittent pain.      Review of Systems:    Constitution: Negative for chills, fever, and sweats.  Negative for unexplained weight loss.    HENT:  Negative for headaches and blurry vision.    Cardiovascular:Negative for chest pain or irregular heart beat. Negative for hypertension.    Respiratory:  Negative for cough and shortness of breath.    Gastrointestinal: Negative for abdominal pain, heartburn, melena, nausea, and vomitting.    Genitourinary:  Negative bladder incontinence and dysuria.    Musculoskeletal:  See HPI for details.    Neurological: Negative for numbness.    Psychiatric/Behavioral: Negative for depression.  The patient is not nervous/anxious.      Endocrine: Negative for polyuria    Hematologic/Lymphatic: Negative for bleeding problem.  Does not bruise/bleed easily.    Skin: Negative for poor would healing and rash    Objective:     Physical Examination:    Vital Signs: VITALS@    Body mass index is 29.09 kg/m².    This a well-developed, well nourished patient in no acute distress.  They are alert and oriented and cooperative to examination.        Right knee exam: Well-healed midline incision to the anterior right knee consistent with right total knee arthroplasty.  Skin to the right knee is clean dry and intact.  No erythema or ecchymosis.  No signs or symptoms of infection.  Range of motion right knee 0-120 degrees.  Stable varus valgus stress.  Negative Homans.  Negative straight leg raise test on the right.  Distally neurovascularly intact.  She is weight-bearing on the right lower extremity and ambulating without an assistive device.    Pertinent New Results:    XRAY Report / Interpretation:  Three views of the right knee taken in clinic today reveal hardware consistent with right total knee arthroplasty in proper alignment without evidence of hardware failure or loosening.  No acute  "fractures or dislocations.  Visualized soft tissues unremarkable.    Assessment/Plan:     Stable 4 months status post right total knee arthroplasty performed on 2025.  She is doing very well postoperatively.  She has completed formal physical therapy and returned to her regular daily activities without significant issues.  I reassured her that her medial joint line tenderness and discomfort with standing from a seated position should continue to improve with time.  I encouraged icing her right knee after increased activity and continuing to take ibuprofen as needed for inflammation.  I would like her to follow up in 3 months to reassess her postoperative progress.    Storm Arriola, Physician Assistant, served in the capacity as a "scribe" for this patient encounter.  A "face-to-face" encounter occurred with Dr. Prosper Hodge on this date.  The treatment plan and medical decision-making is outlined above. Patient was seen and examined with a chaperone.     This note was created using Dragon voice recognition software that occasionally misinterpreted phrases or words.       [1]   Social History  Socioeconomic History    Marital status:     Number of children: 1   Tobacco Use    Smoking status: Former     Current packs/day: 0.00     Average packs/day: 1 pack/day for 13.0 years (13.0 ttl pk-yrs)     Types: Cigarettes     Start date: 1996     Quit date: 2009     Years since quittin.3    Smokeless tobacco: Never    Tobacco comments:     I was in high school when I started smoking - have no idea month and day, year was    Substance and Sexual Activity    Alcohol use: Yes     Comment: I drink occasionally - not every week/day.    Drug use: No    Sexual activity: Yes     Partners: Male     Birth control/protection: Post-menopausal, See Surgical Hx     Social Drivers of Health     Financial Resource Strain: Low Risk  (2025)    Overall Financial Resource Strain (CARDIA)     Difficulty " of Paying Living Expenses: Not hard at all   Food Insecurity: No Food Insecurity (2/17/2025)    Hunger Vital Sign     Worried About Running Out of Food in the Last Year: Never true     Ran Out of Food in the Last Year: Never true   Transportation Needs: No Transportation Needs (2/17/2025)    PRAPARE - Transportation     Lack of Transportation (Medical): No     Lack of Transportation (Non-Medical): No   Physical Activity: Unknown (2/17/2025)    Exercise Vital Sign     Days of Exercise per Week: 0 days   Stress: Stress Concern Present (2/17/2025)    [employer] [place] [employer] - Occupational Stress Questionnaire     Feeling of Stress : Very much   Housing Stability: Low Risk  (2/17/2025)    Housing Stability Vital Sign     Unable to Pay for Housing in the Last Year: No     Number of Times Moved in the Last Year: 0     Homeless in the Last Year: No

## 2025-05-28 DIAGNOSIS — G25.81 RLS (RESTLESS LEGS SYNDROME): ICD-10-CM

## 2025-05-28 DIAGNOSIS — F33.1 MODERATE EPISODE OF RECURRENT MAJOR DEPRESSIVE DISORDER: ICD-10-CM

## 2025-05-29 RX ORDER — SERTRALINE HYDROCHLORIDE 50 MG/1
150 TABLET, FILM COATED ORAL DAILY
Qty: 90 TABLET | Refills: 11 | Status: SHIPPED | OUTPATIENT
Start: 2025-05-29

## 2025-05-29 RX ORDER — ROPINIROLE 2 MG/1
2 TABLET, FILM COATED ORAL NIGHTLY
Qty: 90 TABLET | Refills: 3 | Status: SHIPPED | OUTPATIENT
Start: 2025-05-29

## 2025-05-29 NOTE — TELEPHONE ENCOUNTER
Please see the attached refill request.  Patient is asking for her Zoloft prescription to be changed to 50mg once daily for her insurance to cover it.   Last refill - Zoloft - 2/24/25//Ropinirole - 6/12/24  Last OV - 4/7/25  Next OV - 10/7/25

## 2025-06-17 ENCOUNTER — TELEPHONE (OUTPATIENT)
Dept: OBSTETRICS AND GYNECOLOGY | Facility: CLINIC | Age: 70
End: 2025-06-17
Payer: MEDICARE

## 2025-06-17 NOTE — TELEPHONE ENCOUNTER
Copied from CRM #[id number]. Topic: Appointments - Appointment Access  >> Jun 17, 2025 12:26 PM Joaquin wrote:  Type:  Sooner Appointment Request    Caller is requesting a sooner appointment.  Caller declined first available appointment listed below.  Caller will not accept being placed on the waitlist and is requesting a message be sent to doctor.    Name of Caller:  Patient  When is the first available appointment?  N/a  Symptoms:  itch. Problem/concern  Would the patient rather a call back or a response via [facility]? Call  Best Call Back Number:  [phone]   Additional Information:  Wanted to be seen week of 6/23

## 2025-06-20 NOTE — TELEPHONE ENCOUNTER
Copied from CRM #[id number]. Topic: Appointments - Appointment Access  >> Jun 20, 2025 12:16 PM Tiffanie wrote:  Type:  Sooner Apoointment Request    Caller is requesting a sooner appointment.  Caller declined first available appointment listed below.  Caller will not accept being placed on the waitlist and is requesting a message be sent to doctor.  Name of Caller:pt   When is the first available appointment?  Symptoms:Itch   Would the patient rather a call back or a response via [facility]? Nop   Best Call Back Number:  Additional Information: =

## 2025-07-02 DIAGNOSIS — Z78.0 MENOPAUSE: ICD-10-CM

## 2025-07-07 ENCOUNTER — HOSPITAL ENCOUNTER (OUTPATIENT)
Dept: RADIOLOGY | Facility: HOSPITAL | Age: 70
Discharge: HOME | End: 2025-07-07
Attending: STUDENT IN AN ORGANIZED HEALTH CARE EDUCATION/TRAINING PROGRAM
Payer: MEDICARE

## 2025-07-07 DIAGNOSIS — Z78.0 MENOPAUSE: ICD-10-CM

## 2025-07-07 PROCEDURE — 77081 DXA BONE DENSITY APPENDICULR: CPT | Mod: 26,,, | Performed by: RADIOLOGY

## 2025-07-07 PROCEDURE — 77081 DXA BONE DENSITY APPENDICULR: CPT | Mod: TC

## 2025-07-15 DIAGNOSIS — E78.2 MIXED HYPERLIPIDEMIA: ICD-10-CM

## 2025-07-16 RX ORDER — ATORVASTATIN CALCIUM 40 MG/1
40 TABLET, FILM COATED ORAL NIGHTLY
Qty: 90 TABLET | Refills: 3 | Status: SHIPPED | OUTPATIENT
Start: 2025-07-16

## 2025-07-16 NOTE — TELEPHONE ENCOUNTER
Refill Routing Note   Medication(s) are not appropriate for processing by [facility] for the following reason(s):        Required labs outdated    [facility] action(s):  Defer             Appointments  past 12m or future 3m with PCP    Date Provider   Last Visit   4/7/2025 Liliana Rangel MD   Next Visit   10/7/2025 Liliana Rangel MD   ED visits in past 90 days: 0        Note composed:12:32 AM 07/16/2025

## 2025-07-16 NOTE — TELEPHONE ENCOUNTER
No care due was identified.  [facility] Embedded Care Due Messages. Reference number: [id number].   7/16/2025 12:22:04 AM CDT

## 2025-07-22 ENCOUNTER — TELEPHONE (OUTPATIENT)
Dept: FAMILY MEDICINE | Facility: CLINIC | Age: 70
End: 2025-07-22
Payer: MEDICARE

## 2025-07-22 DIAGNOSIS — Z12.31 ENCOUNTER FOR SCREENING MAMMOGRAM FOR BREAST CANCER: Primary | ICD-10-CM

## 2025-07-31 ENCOUNTER — TELEPHONE (OUTPATIENT)
Dept: FAMILY MEDICINE | Facility: CLINIC | Age: 70
End: 2025-07-31
Payer: MEDICARE

## 2025-08-08 ENCOUNTER — OFFICE VISIT (OUTPATIENT)
Dept: FAMILY MEDICINE | Facility: CLINIC | Age: 70
End: 2025-08-08
Payer: MEDICARE

## 2025-08-08 ENCOUNTER — TELEPHONE (OUTPATIENT)
Dept: GASTROENTEROLOGY | Facility: CLINIC | Age: 70
End: 2025-08-08
Payer: MEDICARE

## 2025-08-08 VITALS
HEIGHT: 65 IN | DIASTOLIC BLOOD PRESSURE: 64 MMHG | OXYGEN SATURATION: 95 % | SYSTOLIC BLOOD PRESSURE: 114 MMHG | WEIGHT: 182.13 LBS | BODY MASS INDEX: 30.34 KG/M2 | HEART RATE: 72 BPM | RESPIRATION RATE: 14 BRPM

## 2025-08-08 DIAGNOSIS — E89.0 POSTOPERATIVE HYPOTHYROIDISM: ICD-10-CM

## 2025-08-08 DIAGNOSIS — E66.09 CLASS 1 OBESITY DUE TO EXCESS CALORIES WITH BODY MASS INDEX (BMI) OF 30.0 TO 30.9 IN ADULT, UNSPECIFIED WHETHER SERIOUS COMORBIDITY PRESENT: ICD-10-CM

## 2025-08-08 DIAGNOSIS — Z00.00 ENCOUNTER FOR PREVENTATIVE ADULT HEALTH CARE EXAMINATION: ICD-10-CM

## 2025-08-08 DIAGNOSIS — I70.0 AORTIC ATHEROSCLEROSIS: ICD-10-CM

## 2025-08-08 DIAGNOSIS — Z00.00 ENCOUNTER FOR MEDICARE ANNUAL WELLNESS EXAM: Primary | ICD-10-CM

## 2025-08-08 DIAGNOSIS — D69.2 SENILE PURPURA: ICD-10-CM

## 2025-08-08 DIAGNOSIS — Z12.11 SCREENING FOR COLON CANCER: ICD-10-CM

## 2025-08-08 DIAGNOSIS — J43.2 CENTRILOBULAR EMPHYSEMA: ICD-10-CM

## 2025-08-08 DIAGNOSIS — E66.811 CLASS 1 OBESITY DUE TO EXCESS CALORIES WITH BODY MASS INDEX (BMI) OF 30.0 TO 30.9 IN ADULT, UNSPECIFIED WHETHER SERIOUS COMORBIDITY PRESENT: ICD-10-CM

## 2025-08-08 DIAGNOSIS — Z85.850 HISTORY OF THYROID CANCER: ICD-10-CM

## 2025-08-08 PROCEDURE — 99999 PR PBB SHADOW E&M-EST. PATIENT-LVL V: CPT | Mod: PBBFAC,,, | Performed by: NURSE PRACTITIONER

## 2025-08-08 NOTE — PATIENT INSTRUCTIONS
Counseling and Referral of Other Preventative  (Italic type indicates deductible and co-insurance are waived)    Patient Name: Gloria Manuel  Today's Date: 8/8/2025    Health Maintenance       Date Due Completion Date    Shingles Vaccine (1 of 2) Never done ---    RSV Vaccine (Age 60+ and Pregnant patients) (1 - Risk 60-74 years 1-dose series) Never done ---    TETANUS VACCINE 01/01/2017 1/1/2007    Colorectal Cancer Screening 07/19/2025 7/19/2022    Mammogram 09/13/2025 9/13/2024    Influenza Vaccine (1) 09/01/2025 10/2/2024    Hemoglobin A1c (Prediabetes) 02/24/2026 2/24/2025    High Dose Statin 08/08/2026 8/8/2025    DEXA Scan 07/07/2027 7/7/2025    Lipid Panel 11/19/2029 11/19/2024        No orders of the defined types were placed in this encounter.    The following information is provided to all patients.  This information is to help you find resources for any of the problems found today that may be affecting your health:                  Living healthy guide: ms.gov    Understanding Diabetes: www.diabetes.org      Eating healthy: www.cdc.gov/healthyweight      CDC home safety checklist: www.cdc.gov/steadi/patient.html      Agency on Aging: ms.gov    Alcoholics anonymous (AA): www.aa.org      Physical Activity: www.etressa.nih.gov/ue0cxbm      Tobacco use: ms.gov

## 2025-08-08 NOTE — PROGRESS NOTES
"  Gloria Manuel presented for a follow-up Medicare AWV today. The following components were reviewed and updated:    Medical history  Family History  Social history  Allergies and Current Medications  Health Risk Assessment  Health Maintenance  Care Team    **See Completed Assessments for Annual Wellness visit with in the encounter summary    The following assessments were completed:  Depression Screening  Cognitive function Screening  Timed Get Up Test  Whisper Test      Opioid documentation:      Patient does not have a current opioid prescription.          Vitals:    08/08/25 1136   BP: 114/64   BP Location: Left arm   Patient Position: Sitting   Pulse: 72   Resp: 14   SpO2: 95%   Weight: 82.6 kg (182 lb 1.6 oz)   Height: 5' 5" (1.651 m)     Body mass index is 30.3 kg/m².       Physical Exam  Constitutional:       General: She is not in acute distress.     Appearance: Normal appearance.   Eyes:      Conjunctiva/sclera: Conjunctivae normal.   Cardiovascular:      Rate and Rhythm: Normal rate.   Pulmonary:      Effort: No respiratory distress.      Breath sounds: No wheezing.   Musculoskeletal:      Right lower leg: No edema.      Left lower leg: No edema.   Skin:     General: Skin is warm and dry.      Coloration: Skin is not jaundiced.      Findings: Bruising present. No rash.   Neurological:      General: No focal deficit present.      Mental Status: She is alert and oriented to person, place, and time.   Psychiatric:         Mood and Affect: Mood normal.         Behavior: Behavior normal.         Diagnoses and health risks identified today and associated recommendations/orders:  1. Encounter for Medicare annual wellness exam  - Referral to Enhanced Annual Wellness Visit (eAWV) W+1    2. Encounter for preventative adult health care examination  Screenings performed as noted above. Personal preventative testing needs reviewed.   3. Screening for colon cancer  - Case Request Endoscopy: COLONOSCOPY    4. History " of thyroid cancer  Stable. Followed by Endocrinology     5. Postoperative hypothyroidism  Stable. Followed by Endocrinology   6. Centrilobular emphysema  Stable. Cessation of smoking     7. Senile purpura  Stable. Good skin moisturizer/ sun protective clothing/sun screen encouraged   Followed by dermatology     8. Aortic atherosclerosis  Stable. Monitored by PCP   Continue current medication/treatment regimen     9. Class 1 obesity due to excess calories with body mass index (BMI) of 30.0 to 30.9 in adult, unspecified whether serious comorbidity present  Maintain physical activity  Decreased calorie diet   BMI recommendation <30       Provided Gloria with a 5-10 year written screening schedule and personal prevention plan. Recommendations were developed using the USPSTF age appropriate recommendations. Education, counseling, and referrals were provided as needed.  After Visit Summary printed and given to patient which includes a list of additional screenings\tests needed.    Follow up in about 1 year (around 8/8/2026) for your next annual wellness visit.      Aydee Lugo NP

## (undated) DEVICE — STRAP OR TABLE 5IN X 72IN

## (undated) DEVICE — PAD BOVIE ADULT

## (undated) DEVICE — DRAPE STERI U-SHAPED 47X51IN

## (undated) DEVICE — SPONGE GAUZE 2S 4X4 STERILE NON21424

## (undated) DEVICE — WRAP KNEE ACCU THERM GEL PACK

## (undated) DEVICE — BONE PINS (3.2MM X 140MM)
Type: IMPLANTABLE DEVICE | Site: KNEE | Status: NON-FUNCTIONAL
Removed: 2025-01-27

## (undated) DEVICE — BANDAGE ACE DOUBLE STER 6IN

## (undated) DEVICE — SUTURE SILK 4-0 18 A183H

## (undated) DEVICE — KIT TRIATHLON CR TIB PREP SZ3

## (undated) DEVICE — DRAPE HIP PCH 112X137X89IN

## (undated) DEVICE — KIT DRAPE RIO ONE PIECE W/POCK

## (undated) DEVICE — KIT VIZADISC KNEE TRACKING

## (undated) DEVICE — SEE MEDLINE ITEM 152530

## (undated) DEVICE — BLADE SAW SGTL 21.2X85.5X1.2

## (undated) DEVICE — SEE MEDLINE ITEM 146313

## (undated) DEVICE — SEE MEDLINE ITEM 146420

## (undated) DEVICE — COVER MAYO STND XL 30X57IN

## (undated) DEVICE — SEALER BIPOLAR TISSUE 6.0

## (undated) DEVICE — BNDG COFLEX FOAM LF2 ST 6X5YD

## (undated) DEVICE — COVER TABLE 44X90 STERILE

## (undated) DEVICE — BLADE SURG CARBON STEEL #10

## (undated) DEVICE — BLADE SAW SGTL NARROW 0.89

## (undated) DEVICE — PILLOW HIP ABDUCTION MED

## (undated) DEVICE — SOLUTION IRRI NS BOTTLE 1000ML R5200-01

## (undated) DEVICE — DRAPE U SPLIT SHEET 54X76IN

## (undated) DEVICE — SUTURE SILK 2-0 SH 30 K833H

## (undated) DEVICE — DECANTER FLUID TRNSF WHITE 9IN

## (undated) DEVICE — SPONGE BULKEE II ABSRB 6X6.75

## (undated) DEVICE — ELECTRODE RET DUAL PLATE CORD

## (undated) DEVICE — WRAP DEMAYO LEG STERILE

## (undated) DEVICE — DRAPE ORTH SPLIT 77X108IN

## (undated) DEVICE — GLOVE SENSICARE PI GRN 8.5

## (undated) DEVICE — Device

## (undated) DEVICE — SUTURE SILK 3-0 18 A184H

## (undated) DEVICE — INTERPULSE SET

## (undated) DEVICE — SEE MEDLINE ITEM 157131

## (undated) DEVICE — SUT FIBERWIRE 2 38 IN TAPER

## (undated) DEVICE — PAD ABD 8X10 STERILE

## (undated) DEVICE — DRAPE STERI INSTRUMENT 1018

## (undated) DEVICE — APPLICATOR CHLORAPREP ORN 26ML

## (undated) DEVICE — SOL 9P NACL IRR PIC IL

## (undated) DEVICE — MANIFOLD 4 PORT

## (undated) DEVICE — SOL IRR NACL .9% 3000ML

## (undated) DEVICE — DRAPE MAGNETIC 16X20 Q420

## (undated) DEVICE — SUTURE VICRYL 2-0 CT-1 18 VCP839D

## (undated) DEVICE — DRAPE STERI-DRAPE 1000 17X11IN

## (undated) DEVICE — PADDING WYTEX UNDRCST 6INX4YD

## (undated) DEVICE — PACK CUSTOM UNIV BASIN SLI

## (undated) DEVICE — SYRINGE HYPODERMC LL 22G 1.5 ECLIPSE 30

## (undated) DEVICE — DRESSING GAUZE OIL EMUL 3X8

## (undated) DEVICE — DRAPE PLASTIC U 60X72

## (undated) DEVICE — TIP BOVIE ENT 2.75      E1455

## (undated) DEVICE — PACK BASIC

## (undated) DEVICE — CLOSURE SKIN STERI STRIP 1/2X4

## (undated) DEVICE — TOGA FLYTE PEEL AWAY XLARGE

## (undated) DEVICE — GLOVE SURG ULTRA TOUCH 8.5

## (undated) DEVICE — SEE MEDLINE ITEM 146417

## (undated) DEVICE — ELECTRODE REM PLYHSV RETURN 9

## (undated) DEVICE — SPACESUIT TOGA T5 ZIPPER PEEL

## (undated) DEVICE — DRAPE HIP TIBURON 87X115X134

## (undated) DEVICE — SEE MEDLINE ITEM 152622

## (undated) DEVICE — DRESSING TEGADERM 4X4 3/4 TD1004

## (undated) DEVICE — SUT MONOCRYL 3-0 PS-1

## (undated) DEVICE — NDL SPINAL 18GX3.5 SPINOCAN

## (undated) DEVICE — GLOVE BIOGEL MICRO SURGEON PINK SZ 7

## (undated) DEVICE — SYS CLSR DERMABOND PRINEO 42CM

## (undated) DEVICE — PACK EXTREMITY ORTHOMAX

## (undated) DEVICE — KIT TRIATHLON TIBIAL SIZER

## (undated) DEVICE — SEE MEDLINE ITEM 157216

## (undated) DEVICE — SUT CTD VICRYL CT-1 27

## (undated) DEVICE — COVER LIGHT HANDLE LB53

## (undated) DEVICE — YANKAUER FLEX NO VENT HI CAP

## (undated) DEVICE — SPONGE XRAY DETECTABLE 4X4

## (undated) DEVICE — DISSECTOR EXACT LIGASURE 20.6MM-21CM

## (undated) DEVICE — YANKAUER OPEN TIP W/O VENT

## (undated) DEVICE — DRAPE INCISE IOBAN 2 23X17IN

## (undated) DEVICE — UNDERGLOVES BIOGEL PI SIZE 8.5

## (undated) DEVICE — SOL NACL IRR 1000ML BTL

## (undated) DEVICE — TOWEL OR DISP STRL BLUE 4/PK

## (undated) DEVICE — CATH URETHRAL RED 16FR

## (undated) DEVICE — GOWN TOGA SYS PEELWY ZIP 2 XL

## (undated) DEVICE — SLEEVE SCD EXPRESS KNEE MEDIUM

## (undated) DEVICE — BLADE MAKO STANDARD

## (undated) DEVICE — BLADE ELECTRO EXTENDED.

## (undated) DEVICE — SUT MONOCRYL PLUS UD 3-0 27

## (undated) DEVICE — DRAPE INVISISHIELD TOWEL SMALL

## (undated) DEVICE — SUT CTD VICRYL 1 UND BR

## (undated) DEVICE — SYS SURGIPHOR STRL IRRG

## (undated) DEVICE — ALCOHOL 70% ISOP RUBBING 4OZ

## (undated) DEVICE — PILLOW ABDUCTION FOAM MED

## (undated) DEVICE — SUT STRATAFIX PDS 1 CTX 18IN

## (undated) DEVICE — MIXER BONE CEMENT

## (undated) DEVICE — ALCOHOL RUBBING 70% ISO 4OZ

## (undated) DEVICE — SUT STRATAFIX SPIRAL VIOLET

## (undated) DEVICE — DRAPE THREE-QTR REINF 53X77IN

## (undated) DEVICE — PACK SIRUS BASIC V SURG STRL

## (undated) DEVICE — GLOVE SENSICARE PI ALOE 8.5

## (undated) DEVICE — TOURNIQUET SB QC DP 34X4IN

## (undated) DEVICE — ADHESIVE MASTISOL VIAL 48/BX

## (undated) DEVICE — BONE PINS (3.2MM X 110MM)
Type: IMPLANTABLE DEVICE | Site: KNEE | Status: NON-FUNCTIONAL
Removed: 2025-01-27

## (undated) DEVICE — SUTURE SILK 2-0 18 A185H

## (undated) DEVICE — SYR 50CC LL

## (undated) DEVICE — SUTURE CHROMIC 3-0 SH 27 G122H

## (undated) DEVICE — SPONGE PEANUT 3/8 7103

## (undated) DEVICE — PADDING CAST SPECIALIST 6X4YD

## (undated) DEVICE — GAUZE SPONGE BULKEE 6X6.75IN

## (undated) DEVICE — TAPE SILK 3IN

## (undated) DEVICE — DRAPE INCISE IOBAN 2 23X33IN

## (undated) DEVICE — SUTURE VICRYL 4-0 SH 27 J415H

## (undated) DEVICE — ADHESIVE TISSUE EXOFIN

## (undated) DEVICE — SLEEVE SCD EXPRESS CALF MEDIUM

## (undated) DEVICE — SUT 2-0 VICRYL / CT-1

## (undated) DEVICE — BANDAGE ESMARK ELASTIC ST 6X9

## (undated) DEVICE — HEMOSTAT FIBRILLAR 2X4 1962

## (undated) DEVICE — TAPE SURGICAL MICROPORE 3IN

## (undated) DEVICE — DRAPE THYROID 89255

## (undated) DEVICE — NEEDLE SAFETY ECLIPSE 25G 1-1/2IN 305767

## (undated) DEVICE — TRAY GENERAL SURGERY

## (undated) DEVICE — CLIP APPLIER MCS20 STERILMED ETHMCS20

## (undated) DEVICE — KIT TRIATHLON CR FEM PREP SZ4

## (undated) DEVICE — SOL NACL IRR 3000ML

## (undated) DEVICE — ELECTRODE BLD EXT 6.50 ST DISP

## (undated) DEVICE — POUCH INSTRUMENT 1018

## (undated) DEVICE — SEE MEDLINE ITEM 157166